# Patient Record
Sex: FEMALE | Race: WHITE | NOT HISPANIC OR LATINO | URBAN - METROPOLITAN AREA
[De-identification: names, ages, dates, MRNs, and addresses within clinical notes are randomized per-mention and may not be internally consistent; named-entity substitution may affect disease eponyms.]

---

## 2018-09-10 ENCOUNTER — INPATIENT (INPATIENT)
Facility: HOSPITAL | Age: 73
LOS: 1 days | Discharge: ANOTHER HOSPITAL | End: 2018-09-12
Attending: INTERNAL MEDICINE | Admitting: INTERNAL MEDICINE
Payer: MEDICARE

## 2018-09-10 VITALS
SYSTOLIC BLOOD PRESSURE: 150 MMHG | TEMPERATURE: 98 F | HEART RATE: 103 BPM | OXYGEN SATURATION: 100 % | RESPIRATION RATE: 16 BRPM | DIASTOLIC BLOOD PRESSURE: 50 MMHG

## 2018-09-10 DIAGNOSIS — Z29.9 ENCOUNTER FOR PROPHYLACTIC MEASURES, UNSPECIFIED: ICD-10-CM

## 2018-09-10 DIAGNOSIS — Z90.49 ACQUIRED ABSENCE OF OTHER SPECIFIED PARTS OF DIGESTIVE TRACT: Chronic | ICD-10-CM

## 2018-09-10 DIAGNOSIS — I10 ESSENTIAL (PRIMARY) HYPERTENSION: ICD-10-CM

## 2018-09-10 DIAGNOSIS — Z98.890 OTHER SPECIFIED POSTPROCEDURAL STATES: Chronic | ICD-10-CM

## 2018-09-10 DIAGNOSIS — C79.9 SECONDARY MALIGNANT NEOPLASM OF UNSPECIFIED SITE: ICD-10-CM

## 2018-09-10 DIAGNOSIS — M54.5 LOW BACK PAIN: ICD-10-CM

## 2018-09-10 DIAGNOSIS — M54.42 LUMBAGO WITH SCIATICA, LEFT SIDE: ICD-10-CM

## 2018-09-10 LAB
ALBUMIN SERPL ELPH-MCNC: 3.9 G/DL — SIGNIFICANT CHANGE UP (ref 3.3–5)
ALP SERPL-CCNC: 75 U/L — SIGNIFICANT CHANGE UP (ref 40–120)
ALT FLD-CCNC: 7 U/L — SIGNIFICANT CHANGE UP (ref 4–33)
AST SERPL-CCNC: 13 U/L — SIGNIFICANT CHANGE UP (ref 4–32)
BASE EXCESS BLDV CALC-SCNC: 5 MMOL/L — SIGNIFICANT CHANGE UP
BASOPHILS # BLD AUTO: 0.08 K/UL — SIGNIFICANT CHANGE UP (ref 0–0.2)
BASOPHILS NFR BLD AUTO: 0.6 % — SIGNIFICANT CHANGE UP (ref 0–2)
BILIRUB SERPL-MCNC: 0.5 MG/DL — SIGNIFICANT CHANGE UP (ref 0.2–1.2)
BLOOD GAS VENOUS - CREATININE: 0.99 MG/DL — SIGNIFICANT CHANGE UP (ref 0.5–1.3)
BUN SERPL-MCNC: 15 MG/DL — SIGNIFICANT CHANGE UP (ref 7–23)
CALCIUM SERPL-MCNC: 10.9 MG/DL — HIGH (ref 8.4–10.5)
CHLORIDE BLDV-SCNC: 104 MMOL/L — SIGNIFICANT CHANGE UP (ref 96–108)
CHLORIDE SERPL-SCNC: 98 MMOL/L — SIGNIFICANT CHANGE UP (ref 98–107)
CO2 SERPL-SCNC: 24 MMOL/L — SIGNIFICANT CHANGE UP (ref 22–31)
CREAT SERPL-MCNC: 0.97 MG/DL — SIGNIFICANT CHANGE UP (ref 0.5–1.3)
EOSINOPHIL # BLD AUTO: 0.17 K/UL — SIGNIFICANT CHANGE UP (ref 0–0.5)
EOSINOPHIL NFR BLD AUTO: 1.2 % — SIGNIFICANT CHANGE UP (ref 0–6)
GAS PNL BLDV: 136 MMOL/L — SIGNIFICANT CHANGE UP (ref 136–146)
GLUCOSE BLDV-MCNC: 121 — HIGH (ref 70–99)
GLUCOSE SERPL-MCNC: 119 MG/DL — HIGH (ref 70–99)
HCO3 BLDV-SCNC: 27 MMOL/L — SIGNIFICANT CHANGE UP (ref 20–27)
HCT VFR BLD CALC: 39.7 % — SIGNIFICANT CHANGE UP (ref 34.5–45)
HCT VFR BLDV CALC: 40.6 % — SIGNIFICANT CHANGE UP (ref 34.5–45)
HGB BLD-MCNC: 12.6 G/DL — SIGNIFICANT CHANGE UP (ref 11.5–15.5)
HGB BLDV-MCNC: 13.2 G/DL — SIGNIFICANT CHANGE UP (ref 11.5–15.5)
IMM GRANULOCYTES # BLD AUTO: 0.06 # — SIGNIFICANT CHANGE UP
IMM GRANULOCYTES NFR BLD AUTO: 0.4 % — SIGNIFICANT CHANGE UP (ref 0–1.5)
LACTATE BLDV-MCNC: 2.2 MMOL/L — HIGH (ref 0.5–2)
LIDOCAIN IGE QN: 13.1 U/L — SIGNIFICANT CHANGE UP (ref 7–60)
LYMPHOCYTES # BLD AUTO: 1.41 K/UL — SIGNIFICANT CHANGE UP (ref 1–3.3)
LYMPHOCYTES # BLD AUTO: 10.2 % — LOW (ref 13–44)
MCHC RBC-ENTMCNC: 27.5 PG — SIGNIFICANT CHANGE UP (ref 27–34)
MCHC RBC-ENTMCNC: 31.7 % — LOW (ref 32–36)
MCV RBC AUTO: 86.5 FL — SIGNIFICANT CHANGE UP (ref 80–100)
MONOCYTES # BLD AUTO: 0.83 K/UL — SIGNIFICANT CHANGE UP (ref 0–0.9)
MONOCYTES NFR BLD AUTO: 6 % — SIGNIFICANT CHANGE UP (ref 2–14)
NEUTROPHILS # BLD AUTO: 11.24 K/UL — HIGH (ref 1.8–7.4)
NEUTROPHILS NFR BLD AUTO: 81.6 % — HIGH (ref 43–77)
NRBC # FLD: 0 — SIGNIFICANT CHANGE UP
PCO2 BLDV: 42 MMHG — SIGNIFICANT CHANGE UP (ref 41–51)
PH BLDV: 7.45 PH — HIGH (ref 7.32–7.43)
PLATELET # BLD AUTO: 358 K/UL — SIGNIFICANT CHANGE UP (ref 150–400)
PMV BLD: 9.7 FL — SIGNIFICANT CHANGE UP (ref 7–13)
PO2 BLDV: < 24 MMHG — LOW (ref 35–40)
POTASSIUM BLDV-SCNC: 4.3 MMOL/L — SIGNIFICANT CHANGE UP (ref 3.4–4.5)
POTASSIUM SERPL-MCNC: 4 MMOL/L — SIGNIFICANT CHANGE UP (ref 3.5–5.3)
POTASSIUM SERPL-SCNC: 4 MMOL/L — SIGNIFICANT CHANGE UP (ref 3.5–5.3)
PROT SERPL-MCNC: 8.1 G/DL — SIGNIFICANT CHANGE UP (ref 6–8.3)
RBC # BLD: 4.59 M/UL — SIGNIFICANT CHANGE UP (ref 3.8–5.2)
RBC # FLD: 14.8 % — HIGH (ref 10.3–14.5)
SAO2 % BLDV: 32.6 % — LOW (ref 60–85)
SODIUM SERPL-SCNC: 137 MMOL/L — SIGNIFICANT CHANGE UP (ref 135–145)
WBC # BLD: 13.79 K/UL — HIGH (ref 3.8–10.5)
WBC # FLD AUTO: 13.79 K/UL — HIGH (ref 3.8–10.5)

## 2018-09-10 PROCEDURE — 99223 1ST HOSP IP/OBS HIGH 75: CPT

## 2018-09-10 PROCEDURE — 71045 X-RAY EXAM CHEST 1 VIEW: CPT | Mod: 26,77

## 2018-09-10 PROCEDURE — 71045 X-RAY EXAM CHEST 1 VIEW: CPT | Mod: 26

## 2018-09-10 RX ORDER — HYDROMORPHONE HYDROCHLORIDE 2 MG/ML
1 INJECTION INTRAMUSCULAR; INTRAVENOUS; SUBCUTANEOUS EVERY 4 HOURS
Qty: 0 | Refills: 0 | Status: DISCONTINUED | OUTPATIENT
Start: 2018-09-10 | End: 2018-09-11

## 2018-09-10 RX ORDER — LOSARTAN POTASSIUM 100 MG/1
50 TABLET, FILM COATED ORAL DAILY
Qty: 0 | Refills: 0 | Status: DISCONTINUED | OUTPATIENT
Start: 2018-09-10 | End: 2018-09-12

## 2018-09-10 RX ORDER — ENOXAPARIN SODIUM 100 MG/ML
40 INJECTION SUBCUTANEOUS EVERY 24 HOURS
Qty: 0 | Refills: 0 | Status: DISCONTINUED | OUTPATIENT
Start: 2018-09-10 | End: 2018-09-11

## 2018-09-10 RX ORDER — HYDROMORPHONE HYDROCHLORIDE 2 MG/ML
1 INJECTION INTRAMUSCULAR; INTRAVENOUS; SUBCUTANEOUS ONCE
Qty: 0 | Refills: 0 | Status: DISCONTINUED | OUTPATIENT
Start: 2018-09-10 | End: 2018-09-10

## 2018-09-10 RX ORDER — OXYCODONE AND ACETAMINOPHEN 5; 325 MG/1; MG/1
1 TABLET ORAL EVERY 6 HOURS
Qty: 0 | Refills: 0 | Status: DISCONTINUED | OUTPATIENT
Start: 2018-09-10 | End: 2018-09-11

## 2018-09-10 RX ORDER — HYDROMORPHONE HYDROCHLORIDE 2 MG/ML
0.5 INJECTION INTRAMUSCULAR; INTRAVENOUS; SUBCUTANEOUS ONCE
Qty: 0 | Refills: 0 | Status: DISCONTINUED | OUTPATIENT
Start: 2018-09-10 | End: 2018-09-10

## 2018-09-10 RX ORDER — SODIUM CHLORIDE 9 MG/ML
1000 INJECTION INTRAMUSCULAR; INTRAVENOUS; SUBCUTANEOUS ONCE
Qty: 0 | Refills: 0 | Status: COMPLETED | OUTPATIENT
Start: 2018-09-10 | End: 2018-09-10

## 2018-09-10 RX ORDER — INFLUENZA VIRUS VACCINE 15; 15; 15; 15 UG/.5ML; UG/.5ML; UG/.5ML; UG/.5ML
0.5 SUSPENSION INTRAMUSCULAR ONCE
Qty: 0 | Refills: 0 | Status: DISCONTINUED | OUTPATIENT
Start: 2018-09-10 | End: 2018-09-12

## 2018-09-10 RX ADMIN — HYDROMORPHONE HYDROCHLORIDE 1 MILLIGRAM(S): 2 INJECTION INTRAMUSCULAR; INTRAVENOUS; SUBCUTANEOUS at 20:31

## 2018-09-10 RX ADMIN — SODIUM CHLORIDE 1000 MILLILITER(S): 9 INJECTION INTRAMUSCULAR; INTRAVENOUS; SUBCUTANEOUS at 17:26

## 2018-09-10 RX ADMIN — HYDROMORPHONE HYDROCHLORIDE 0.5 MILLIGRAM(S): 2 INJECTION INTRAMUSCULAR; INTRAVENOUS; SUBCUTANEOUS at 23:14

## 2018-09-10 RX ADMIN — HYDROMORPHONE HYDROCHLORIDE 1 MILLIGRAM(S): 2 INJECTION INTRAMUSCULAR; INTRAVENOUS; SUBCUTANEOUS at 17:35

## 2018-09-10 RX ADMIN — HYDROMORPHONE HYDROCHLORIDE 1 MILLIGRAM(S): 2 INJECTION INTRAMUSCULAR; INTRAVENOUS; SUBCUTANEOUS at 15:45

## 2018-09-10 RX ADMIN — HYDROMORPHONE HYDROCHLORIDE 1 MILLIGRAM(S): 2 INJECTION INTRAMUSCULAR; INTRAVENOUS; SUBCUTANEOUS at 20:01

## 2018-09-10 RX ADMIN — HYDROMORPHONE HYDROCHLORIDE 1 MILLIGRAM(S): 2 INJECTION INTRAMUSCULAR; INTRAVENOUS; SUBCUTANEOUS at 15:26

## 2018-09-10 RX ADMIN — HYDROMORPHONE HYDROCHLORIDE 1 MILLIGRAM(S): 2 INJECTION INTRAMUSCULAR; INTRAVENOUS; SUBCUTANEOUS at 17:22

## 2018-09-10 RX ADMIN — HYDROMORPHONE HYDROCHLORIDE 0.5 MILLIGRAM(S): 2 INJECTION INTRAMUSCULAR; INTRAVENOUS; SUBCUTANEOUS at 23:44

## 2018-09-10 RX ADMIN — ENOXAPARIN SODIUM 40 MILLIGRAM(S): 100 INJECTION SUBCUTANEOUS at 21:44

## 2018-09-10 NOTE — ED PROVIDER NOTE - ATTENDING CONTRIBUTION TO CARE
73F h/o HTN, spinal stenosis presents with back pain. Had imaging one week ago including CT and MRI that showed likely new uterine vs endometrial cancer with spine mets in L2, no evidence spinal cord compression, retroperitoneal mets. Lives at home alone, unable to perform ADLs due to pain. No reports of bowel or bladder incontinence. L > R leg swelling but no weakness. No CP/SOB. On exam NAD, mmm, lungs CTAB, RRR, abdomen soft NT/ND, 2+ edema R, 3+  edema L, 2+ pulses b/l, diffuse lumbar spine ttp, neuro A&Ox3, no focal deficits, skin warm and dry, no rash. Plan for u/s to evaluate for DVT. Needs admission for pain control and further oncologic evaluation.

## 2018-09-10 NOTE — H&P ADULT - FAMILY HISTORY
Mother  Still living? Unknown  Family history of diabetes mellitus, Age at diagnosis: Age Unknown  Family history of acute myocardial infarction, Age at diagnosis: Age Unknown     Sibling  Still living? Unknown  Family history of diabetes mellitus, Age at diagnosis: Age Unknown

## 2018-09-10 NOTE — H&P ADULT - PROBLEM SELECTOR PLAN 1
- Dilaudid/percocet prn for pain ctrl  - neurosurgery consult reviewed  - TLSO brace  - neuro checks q4

## 2018-09-10 NOTE — ED PROVIDER NOTE - MEDICAL DECISION MAKING DETAILS
multiple medical complaints including low back pain, doubt cord compression but has e/o root compression per her paperwork from prior MRI/CT. Patient needs admission for pain control and PT/OT at least but will benefit from gyn/onc consult and spine consult (to be done when admitted). In ER will do basic labs, analgesia, trop, ekg, cxr, admit.

## 2018-09-10 NOTE — H&P ADULT - NSHPREVIEWOFSYSTEMS_GEN_ALL_CORE
REVIEW OF SYSTEMS    General:  Poor appetite  Skin/Breast:  Negative  Ophthalmologic:  Negative  ENMT:  Negative  Respiratory and Thorax:  Negative  Cardiovascular:  + Pedal edema 2/2 extended periods of sitting.  Occasional exertional chest pain    Gastrointestinal:  Negative  Genitourinary:  Negative  Musculoskeletal:  Back pain  Neurological:  L thigh paresthesia   Psychiatric:  Negative  Hematology/Lymphatics:  Negative	  Endocrine:	  Negative  Allergic/Immunologic:	 Negative

## 2018-09-10 NOTE — ED PROVIDER NOTE - CARE PLAN
Principal Discharge DX:	Acute bilateral low back pain without sciatica  Secondary Diagnosis:	Metastatic cancer

## 2018-09-10 NOTE — CONSULT NOTE ADULT - PROBLEM SELECTOR RECOMMENDATION 9
-[facility] records of MRI/CT being uploaded. Will f/u results  -Will evaluate pt further after pt admitted. Unable to complete physical exam at this time 2/2 back pain.    To be d/w attending  DINORA Villalta pgy2 -[facility] records of MRI/CT being uploaded. Will f/u results  -Will evaluate pt further after pt admitted.    To be d/w attending  DINORA Villalta pgy2

## 2018-09-10 NOTE — CONSULT NOTE ADULT - SUBJECTIVE AND OBJECTIVE BOX
R2 GYN ED CONSULT NOTE    72yo G0 postmenopausal woman presenting to the ED with lower back pain that has worsen in the past couple of days. She was evaluated by an outside ortho gor    Pt denies fever, chills, nausea, vomiting, diarrhea, headache, constipation, dizzyness, syncope, chest pain, palpitations, shortness of breath, dysuria, urgency, frequency, abdominal/pelvic pain, vaginal bleeding/discharge/odor/pain/itching, breast lumps/bumps, dyspareunia.    In ED today    Primary OB/GYN:  OBH:  GYNH: denies hx of fibroids, ov cysts, abnl paps, sti  PMSH:  MEDS: none  ALL: nkda  SOCH: denies t/e/d; safe at home  FAMH: denies fam hx of breast/uterine/ovarian/colon cancer  ROS: negative except per hpi    OBJECTIVE:    VITAL SIGNS:  Vital Signs Last 24 Hrs  T(C): 36.7 (10 Sep 2018 13:53), Max: 36.7 (10 Sep 2018 13:53)  T(F): 98.1 (10 Sep 2018 13:53), Max: 98.1 (10 Sep 2018 13:53)  HR: 98 (10 Sep 2018 15:26) (98 - 103)  BP: 189/76 (10 Sep 2018 15:26) (150/50 - 189/76)  BP(mean): --  RR: 18 (10 Sep 2018 15:26) (16 - 18)  SpO2: 99% (10 Sep 2018 15:26) (99% - 100%)    CAPILLARY BLOOD GLUCOSE            PHYSICAL EXAM:  GEN: NAD, A&Ox3  CV: RRR, no m/g/r  LUNGS: CTAB  ABD: soft, NT, ND, +BS  SPECULUM:  PELVIC:  EXT: WWP, no edema/TTP    LABS:                        12.6   13.79 )-----------( 358      ( 10 Sep 2018 15:25 )             39.7   baso 0.6    eos 1.2    imm gran 0.4    lymph 10.2   mono 6.0    poly 81.6       09-10    137  |  98  |  15  ----------------------------<  119<H>  4.0   |  24  |  0.97    Ca    10.9<H>      10 Sep 2018 15:25    TPro  8.1  /  Alb  3.9  /  TBili  0.5  /  DBili  x   /  AST  13  /  ALT  7   /  AlkPhos  75  09-10          RADIOLOGY:    ASSESSMENT:    RECOMMENDATIONS: R2 GYN ED CONSULT NOTE    74yo G0 postmenopausal woman presenting to the ED with lower back pain that has worsen in the past couple of days. Pain is bilateral and radiating to the lower extremities. She was evaluated by an outside ortho 1 weeks ago and had an outside MRI and CT. She states that she had the imaging to evaluate for spinal stenosis but imaging showed concern for uterine malignancy. She was scheduled to see a gyn/onc at [facility] on Wednesday but came in the ED because the pain was too severe.     She has not seen a gyn since 2001 when she had a D+C for AUB. She states that to her knowledge the pathology was benign.     She has never been sexually active. Denied hx of abn pap (last pap on 2001), fibroids, stds, ovarian cysts.     She has been having episodic vaginal spotting for the past couple of years. Spotting is minimal. She has vague lower abdominal discomfort. She states that she has lost about 35 pounds in the last 6 months or so. She has not had a BM in a week.     Pt denies fever, chills, nausea, vomiting, diarrhea, headache,  dizzyness, syncope, chest pain, palpitations, shortness of breath, dysuria, urgency, frequency, , breast lumps/bumps, dyspareunia.     Last mammogram she states was last year. WNL. No hx of abn mammogram  Never had a colonoscopy.         Primary OB/GYN:none  OBH:G0  GYNH: denies hx of fibroids, ov cysts, abnl paps, sti  PMSH: D+C (2001), s/p cholecystectomy   MEDS: none  ALL: nkda  SOCH: denies t/e/d; safe at home  FAMH: denies fam hx of breast/uterine/ovarian/colon cancer  ROS: negative except per hpi    OBJECTIVE:    VITAL SIGNS:  Vital Signs Last 24 Hrs  T(C): 36.7 (10 Sep 2018 13:53), Max: 36.7 (10 Sep 2018 13:53)  T(F): 98.1 (10 Sep 2018 13:53), Max: 98.1 (10 Sep 2018 13:53)  HR: 98 (10 Sep 2018 15:26) (98 - 103)  BP: 189/76 (10 Sep 2018 15:26) (150/50 - 189/76)  BP(mean): --  RR: 18 (10 Sep 2018 15:26) (16 - 18)  SpO2: 99% (10 Sep 2018 15:26) (99% - 100%)    CAPILLARY BLOOD GLUCOSE            PHYSICAL EXAM:  GEN: NAD, A&Ox3  CV: RRR, no m/g/r  LUNGS: CTAB  ABD: soft, tender to deep palpation in the lower quadrants Exam limited 2/2 back pain  SPECULUM: eferred   PELVIC: Deferred   EXT: WWP, no edema/TTP    LABS:                        12.6   13.79 )-----------( 358      ( 10 Sep 2018 15:25 )             39.7   baso 0.6    eos 1.2    imm gran 0.4    lymph 10.2   mono 6.0    poly 81.6       09-10    137  |  98  |  15  ----------------------------<  119<H>  4.0   |  24  |  0.97    Ca    10.9<H>      10 Sep 2018 15:25    TPro  8.1  /  Alb  3.9  /  TBili  0.5  /  DBili  x   /  AST  13  /  ALT  7   /  AlkPhos  75  09-10

## 2018-09-10 NOTE — CONSULT NOTE ADULT - SUBJECTIVE AND OBJECTIVE BOX
Pager: [phone]     HPI: 73F with history of low back pain for 1 year treated conservatively presents with severe low back pain. Patient noticed low back pain developing since August 2017 and has seen multiple providers which have treated her back pain conservatively with gabapentin and pain control. In the past month she obtained MRI Lspine which revealed erosive mass at L2 and was recommended to get CT Abd/pelvis which revealed uterine mass along with two lung masses. She was scheduled to see oncologist as outpatient but due to worsening low back pain she came to ER. On evaluation patient complains of low back pain radiating to outside of left knee. She denies saddle anesthesia, incontinence but states that she has had difficulty getting out of bed or out of a chair due to pain for past month. She ambulating with cane for past year.    PAST MEDICAL HISTORY   No pertinent past medical history    PAST SURGICAL HISTORY   No significant past surgical history    ALLERGY  No Known Allergies    MEDICATIONS:  Antibiotics:    Neuro:    Anticoagulation:    Other:    REVIEW OF SYSTEMS:  Check here if all are normal other than Neurological []  General:  Eyes:  ENT:  Cardiac:  Respiratory:  GI:  Musculoskeletal:   Skin: Lower extremity swelling  Neurologic:   Psychiatric:     EXAMINATION:    GCS: 15 (E4 V5 M6)  T(C): 36.7 (09-10-18 @ 13:53), Max: 36.7 (09-10-18 @ 13:53)  HR: 98 (09-10-18 @ 15:26) (98 - 103)  BP: 189/76 (09-10-18 @ 15:26) (150/50 - 189/76)  RR: 18 (09-10-18 @ 15:26) (16 - 18)  SpO2: 99% (09-10-18 @ 15:26) (99% - 100%)    Constitutional: No Acute Distress     Neurological: AOx3, Following Commands    Motor exam:          Upper extremity                         Delt     Bicep     Tricep    HG                                                 R         5/5        5/5        5/5       5/5                                               L          5/5        5/5        5/5       5/5          Lower extremity                        HF         KF        KE       DF         PF                                                  R        5/5        5/5        5/5       5/5         5/5                                               L         4+/5        5/5       5/5       5/5          5/5                                                 Sensation: [] intact to light touch  [x] decreased: Left lateral thigh    No clonus    LABS:                        12.6   13.79 )-----------( 358      ( 10 Sep 2018 15:25 )             39.7     09-10    137  |  98  |  15  ----------------------------<  119<H>  4.0   |  24  |  0.97    Ca    10.9<H>      10 Sep 2018 15:25    TPro  8.1  /  Alb  3.9  /  TBili  0.5  /  DBili  x   /  AST  13  /  ALT  7   /  AlkPhos  75  09-10

## 2018-09-10 NOTE — ED ADULT TRIAGE NOTE - CHIEF COMPLAINT QUOTE
pt c/o lower back pain, lower abd pian and constipation x 5 days. pt c/o left lower leg pain, swelling and redness x 1 week.  pmhx htn, spinal stenosis

## 2018-09-10 NOTE — ED PROVIDER NOTE - OBJECTIVE STATEMENT
73 female history of HTN and spinal stenosis here for low back pain and constipation. Low back pain started Feb 2017, recent MRI and CT with IV contrast shows spinal stenosis without cord compression but with uterine mass suspicious for uterine CA with lesions suspicious for mets in spine and in lungs. Awaiting appointment with oncologist this week but could not tolerate back pain which is severe, worse when lays flat, nonradiating. Unable to sleep or care for ADLs on her own. Lives alone now. No perianal anaesthesia. left leg weaker then right. Legs swelling and getting red because has not layed flat in days. Also endorsing weakness, intermittent chest pains.

## 2018-09-10 NOTE — ED PROVIDER NOTE - PHYSICAL EXAMINATION
Gen: NAD, AOx3, non-toxic, obese//            Head: NCAT //            HEENT: EOMI, oral mucosa moist, normal conjunctiva //            Lung: CTAB, no respiratory distress, no wheezes/rhonchi/rales B/L, speaking in full sentences. //            CV: tachy rate, normal rhythm, no murmurs, rubs or gallops //            Abd: soft, NTND, no guarding, no CVA tenderness //            MSK: tenderness to palpation over around L2/L3,   //            Neuro: 5/5 strength in bilateral lower extremities, sensory intact in bilateral lower extremities  , no saddle anaesthesia.  //            Skin: bilateral lower extremities mildly erythematous, normal temp, LLE more swollen compared to the right. otherwise MSK exam WINL//            Psych: normal affect. ~Joel Angeles M.D., Ph.D. -Resident

## 2018-09-10 NOTE — H&P ADULT - PROBLEM SELECTOR PLAN 2
Signs of metastatic  cancer on outpatient imaging  - gyn consult reviewed  - CT a/p image uploaded in [facility], will check CT chest for further staging  - consult oncology in am

## 2018-09-10 NOTE — ED ADULT NURSE NOTE - OBJECTIVE STATEMENT
73F PMH HTN and spinal stenosis here for low back pain and constipation and abnormal CT. Low back pain started Feb 2017, recent MRI and CT with IV contrast shows spinal stenosis without cord compression but with uterine mass suspicious for uterine CA with lesions suspicious for mets in spine and in lungs. Awaiting appointment with oncologist this week but could not tolerate back pain which is severe, worse when lays flat, nonradiating. Unable to sleep or care for ADLs on her own the past week. No saddle anesthesia. Left leg weaker then right. Pt also reports intermittent leg swelling and redness.   Legs swelling and getting red because has not layed flat in days. Also endorsing weakness, intermittent chest pains.

## 2018-09-10 NOTE — ED ADULT NURSE NOTE - NSIMPLEMENTINTERV_GEN_ALL_ED
Implemented All Fall Risk Interventions:  [place] to call system. Call bell, personal items and telephone within reach. Instruct patient to call for assistance. Room bathroom lighting operational. Non-slip footwear when patient is off stretcher. Physically safe environment: no spills, clutter or unnecessary equipment. Stretcher in lowest position, wheels locked, appropriate side rails in place. Provide visual cue, wrist band, yellow gown, etc. Monitor gait and stability. Monitor for mental status changes and reorient to person, place, and time. Review medications for side effects contributing to fall risk. Reinforce activity limits and safety measures with patient and family.

## 2018-09-10 NOTE — H&P ADULT - NSHPPHYSICALEXAM_GEN_ALL_CORE
Vital Signs Last 24 Hrs  T(C): 36.5 (10 Sep 2018 19:48), Max: 36.8 (10 Sep 2018 19:14)  T(F): 97.7 (10 Sep 2018 19:48), Max: 98.2 (10 Sep 2018 19:14)  HR: 93 (10 Sep 2018 19:48) (93 - 103)  BP: 145/71 (10 Sep 2018 19:48) (127/68 - 189/76)  BP(mean): --  RR: 16 (10 Sep 2018 19:48) (16 - 18)  SpO2: 100% (10 Sep 2018 19:48) (98% - 100%)    PHYSICAL EXAM:  GENERAL: No Acute Distress  EYES: conjunctiva and sclera clear  ENMT: Moist mucous membranes   NECK: Supple  CHEST/LUNG: Clear to auscultation bilaterally  HEART: Regular rate and rhythm; No murmurs, rubs, or gallops  ABDOMEN: Soft, Nontender, Nondistended; Bowel sounds normal  EXTREMITIES:   No clubbing, or cyanosis + pedal edema  PSYCH: Normal Affect, Normal Behavior  NEUROLOGY:   - Mental status A&O x 3,  - Cranial Nerves II-XII intact  - Motor: RLE 5/5, LLE HF/KE limited by pain, DF/PF 5/5  SKIN: No rashes or lesions  MUSCULOSKELETAL: No joint swelling

## 2018-09-10 NOTE — ED PROVIDER NOTE - PROGRESS NOTE DETAILS
- MD Karthik,PhD - Resident: patient seen by gyn and spine, found to be hypercalcemic so fluids ordered. Pain not well controlled so more dilaudid ordered. CT abdominal pain uploaded. Will admit

## 2018-09-10 NOTE — H&P ADULT - NSHPLABSRESULTS_GEN_ALL_CORE
09-10    137  |  98  |  15  ----------------------------<  119<H>  4.0   |  24  |  0.97    Ca    10.9<H>      10 Sep 2018 15:25    TPro  8.1  /  Alb  3.9  /  TBili  0.5  /  DBili  x   /  AST  13  /  ALT  7   /  AlkPhos  75  09-10                            12.6   13.79 )-----------( 358      ( 10 Sep 2018 15:25 )             39.7    Outpt MRI report reviewed as in HPI

## 2018-09-10 NOTE — H&P ADULT - PSH
S/P cholecystectomy    S/P dilation and curettage  Early 2000's for DUB, findings benign.  S/P right knee arthroscopy

## 2018-09-10 NOTE — H&P ADULT - HISTORY OF PRESENT ILLNESS
74 y/o F with HTN, prediabetes and chronic back pain 2/2 spinal stenosis p/w worsening of chronic back pain.  Pt reports pain in lower back with R sided sciatica since early 2017.  She had imaging in 2017 which revealed lumbar spinal stenosis.  She had been prescribed gabapentin as well as percocet which she only needed sparingly.  Pt recently has developed worsening lower back pain with paresthesia radiating to L thigh.  Pain makes it difficult to walk, and she feels that her L leg is weaker.  Pt has been unable to lay flat, or stand 2/2 the pain and has spent most of her time sitting.  She has been taking percocet without relief.  She has chronic incontinence, but no new symptoms.  She has been constipated which she attributes to taking percocet more frequently.  She has not had foot numbness or saddle anesthesia.  She had imaging done 2 weeks ago including MRI which showed an infiltrative process at L2 vertebral body, lumbar spinal stenosis and RP soft tissue mass.  CT a/p showed signs of uterine ca with mets to bone and lung.  Pt was referred to an oncologist with appointment scheduled later this week, but due to increasing severity of pain, she was advised to come to the ED by her PCP.       In the ED, pt was given Dilaudid 1mg IV x 2 which provided relief of pain. 74 y/o F with HTN, prediabetes and chronic back pain 2/2 spinal stenosis p/w worsening of chronic back pain.  Pt reports pain in lower back with R sided sciatica since early 2017.  She had imaging in 2017 which revealed lumbar spinal stenosis.  She had been prescribed gabapentin as well as percocet which she only needed sparingly.  Pt recently has developed worsening lower back pain with paresthesia radiating to L thigh.  Pain makes it difficult to walk, and she feels that her L leg is weaker.  Pt has been unable to lay flat, or stand 2/2 the pain and has spent most of her time sitting.  She has been taking percocet without relief.  She has chronic incontinence, but no new symptoms.  She has been constipated which she attributes to taking percocet more frequently.  She has not had foot numbness or saddle anesthesia.  She had imaging done 2 weeks ago including MRI which showed an infiltrative process at L2 vertebral body, lumbar spinal stenosis and RP soft tissue mass.  CT a/p showed signs suspicious of uterine ca with mets to bone and lung.  Pt was referred to an oncologist with appointment scheduled later this week, but due to increasing severity of pain, she was advised to come to the ED by her PCP.       Of note, pt had D+C for abnormal uterine bleeding in early 2000's which showed benign disease.     In the ED, pt was given Dilaudid 1mg IV x 2 which provided relief of pain.

## 2018-09-10 NOTE — ED PROVIDER NOTE - NS ED ROS FT
GENERAL: No fever or chills, //             EYES: no change in vision, //             HEENT: no trouble swallowing or speaking, //                     PULMONARY: no cough or SOB, //             GI: no abdominal pain, no nausea or no vomiting, no diarrhea or constipation, //             : No changes in urination,  //            SKIN: no rashes,  //            NEURO: no headache,  //           otherwise as HPI or negative. ~Joel Angeles M.D., Ph.D. -Resident

## 2018-09-10 NOTE — H&P ADULT - ASSESSMENT
74 y/o F with HTN, lumbar spinal stenosis and prediabetes admitted for severe back pain with findings of metastatic GYN malignancy.

## 2018-09-11 DIAGNOSIS — R22.2 LOCALIZED SWELLING, MASS AND LUMP, TRUNK: ICD-10-CM

## 2018-09-11 DIAGNOSIS — D72.829 ELEVATED WHITE BLOOD CELL COUNT, UNSPECIFIED: ICD-10-CM

## 2018-09-11 DIAGNOSIS — M79.89 OTHER SPECIFIED SOFT TISSUE DISORDERS: ICD-10-CM

## 2018-09-11 DIAGNOSIS — M54.9 DORSALGIA, UNSPECIFIED: ICD-10-CM

## 2018-09-11 DIAGNOSIS — L03.116 CELLULITIS OF LEFT LOWER LIMB: ICD-10-CM

## 2018-09-11 LAB
APPEARANCE UR: SIGNIFICANT CHANGE UP
BACTERIA # UR AUTO: NEGATIVE — SIGNIFICANT CHANGE UP
BILIRUB UR-MCNC: NEGATIVE — SIGNIFICANT CHANGE UP
BLOOD UR QL VISUAL: HIGH
BUN SERPL-MCNC: 15 MG/DL — SIGNIFICANT CHANGE UP (ref 7–23)
CALCIUM SERPL-MCNC: 10.1 MG/DL — SIGNIFICANT CHANGE UP (ref 8.4–10.5)
CHLORIDE SERPL-SCNC: 100 MMOL/L — SIGNIFICANT CHANGE UP (ref 98–107)
CO2 SERPL-SCNC: 23 MMOL/L — SIGNIFICANT CHANGE UP (ref 22–31)
COLOR SPEC: YELLOW — SIGNIFICANT CHANGE UP
CREAT SERPL-MCNC: 0.89 MG/DL — SIGNIFICANT CHANGE UP (ref 0.5–1.3)
GLUCOSE SERPL-MCNC: 136 MG/DL — HIGH (ref 70–99)
GLUCOSE UR-MCNC: NEGATIVE — SIGNIFICANT CHANGE UP
HCT VFR BLD CALC: 37.6 % — SIGNIFICANT CHANGE UP (ref 34.5–45)
HGB BLD-MCNC: 11.9 G/DL — SIGNIFICANT CHANGE UP (ref 11.5–15.5)
HYALINE CASTS # UR AUTO: NEGATIVE — SIGNIFICANT CHANGE UP
KETONES UR-MCNC: SIGNIFICANT CHANGE UP
LEUKOCYTE ESTERASE UR-ACNC: SIGNIFICANT CHANGE UP
MAGNESIUM SERPL-MCNC: 2.1 MG/DL — SIGNIFICANT CHANGE UP (ref 1.6–2.6)
MCHC RBC-ENTMCNC: 27.9 PG — SIGNIFICANT CHANGE UP (ref 27–34)
MCHC RBC-ENTMCNC: 31.6 % — LOW (ref 32–36)
MCV RBC AUTO: 88.1 FL — SIGNIFICANT CHANGE UP (ref 80–100)
NITRITE UR-MCNC: NEGATIVE — SIGNIFICANT CHANGE UP
NRBC # FLD: 0 — SIGNIFICANT CHANGE UP
PH UR: 6.5 — SIGNIFICANT CHANGE UP (ref 5–8)
PLATELET # BLD AUTO: 324 K/UL — SIGNIFICANT CHANGE UP (ref 150–400)
PMV BLD: 9.7 FL — SIGNIFICANT CHANGE UP (ref 7–13)
POTASSIUM SERPL-MCNC: 3.7 MMOL/L — SIGNIFICANT CHANGE UP (ref 3.5–5.3)
POTASSIUM SERPL-SCNC: 3.7 MMOL/L — SIGNIFICANT CHANGE UP (ref 3.5–5.3)
PROT UR-MCNC: 100 — HIGH
RBC # BLD: 4.27 M/UL — SIGNIFICANT CHANGE UP (ref 3.8–5.2)
RBC # FLD: 15 % — HIGH (ref 10.3–14.5)
RBC CASTS # UR COMP ASSIST: >50 — HIGH (ref 0–?)
SODIUM SERPL-SCNC: 139 MMOL/L — SIGNIFICANT CHANGE UP (ref 135–145)
SP GR SPEC: 1.02 — SIGNIFICANT CHANGE UP (ref 1–1.04)
SQUAMOUS # UR AUTO: SIGNIFICANT CHANGE UP
UROBILINOGEN FLD QL: NORMAL — SIGNIFICANT CHANGE UP
WBC # BLD: 9.47 K/UL — SIGNIFICANT CHANGE UP (ref 3.8–10.5)
WBC # FLD AUTO: 9.47 K/UL — SIGNIFICANT CHANGE UP (ref 3.8–10.5)
WBC UR QL: >50 — HIGH (ref 0–?)

## 2018-09-11 PROCEDURE — 99222 1ST HOSP IP/OBS MODERATE 55: CPT

## 2018-09-11 PROCEDURE — 99222 1ST HOSP IP/OBS MODERATE 55: CPT | Mod: GC

## 2018-09-11 PROCEDURE — 99223 1ST HOSP IP/OBS HIGH 75: CPT | Mod: GC

## 2018-09-11 RX ORDER — DOCUSATE SODIUM 100 MG
100 CAPSULE ORAL THREE TIMES A DAY
Qty: 0 | Refills: 0 | Status: DISCONTINUED | OUTPATIENT
Start: 2018-09-11 | End: 2018-09-12

## 2018-09-11 RX ORDER — LACTULOSE 10 G/15ML
20 SOLUTION ORAL EVERY 6 HOURS
Qty: 0 | Refills: 0 | Status: DISCONTINUED | OUTPATIENT
Start: 2018-09-11 | End: 2018-09-12

## 2018-09-11 RX ORDER — HYDROMORPHONE HYDROCHLORIDE 2 MG/ML
1 INJECTION INTRAMUSCULAR; INTRAVENOUS; SUBCUTANEOUS EVERY 4 HOURS
Qty: 0 | Refills: 0 | Status: DISCONTINUED | OUTPATIENT
Start: 2018-09-11 | End: 2018-09-12

## 2018-09-11 RX ORDER — CEFAZOLIN SODIUM 1 G
1000 VIAL (EA) INJECTION EVERY 8 HOURS
Qty: 0 | Refills: 0 | Status: DISCONTINUED | OUTPATIENT
Start: 2018-09-11 | End: 2018-09-12

## 2018-09-11 RX ORDER — POLYETHYLENE GLYCOL 3350 17 G/17G
17 POWDER, FOR SOLUTION ORAL DAILY
Qty: 0 | Refills: 0 | Status: DISCONTINUED | OUTPATIENT
Start: 2018-09-11 | End: 2018-09-12

## 2018-09-11 RX ORDER — SENNA PLUS 8.6 MG/1
2 TABLET ORAL AT BEDTIME
Qty: 0 | Refills: 0 | Status: DISCONTINUED | OUTPATIENT
Start: 2018-09-11 | End: 2018-09-12

## 2018-09-11 RX ADMIN — HYDROMORPHONE HYDROCHLORIDE 1 MILLIGRAM(S): 2 INJECTION INTRAMUSCULAR; INTRAVENOUS; SUBCUTANEOUS at 22:30

## 2018-09-11 RX ADMIN — HYDROMORPHONE HYDROCHLORIDE 1 MILLIGRAM(S): 2 INJECTION INTRAMUSCULAR; INTRAVENOUS; SUBCUTANEOUS at 01:29

## 2018-09-11 RX ADMIN — LOSARTAN POTASSIUM 50 MILLIGRAM(S): 100 TABLET, FILM COATED ORAL at 05:30

## 2018-09-11 RX ADMIN — LACTULOSE 20 GRAM(S): 10 SOLUTION ORAL at 18:09

## 2018-09-11 RX ADMIN — HYDROMORPHONE HYDROCHLORIDE 1 MILLIGRAM(S): 2 INJECTION INTRAMUSCULAR; INTRAVENOUS; SUBCUTANEOUS at 13:53

## 2018-09-11 RX ADMIN — HYDROMORPHONE HYDROCHLORIDE 1 MILLIGRAM(S): 2 INJECTION INTRAMUSCULAR; INTRAVENOUS; SUBCUTANEOUS at 18:24

## 2018-09-11 RX ADMIN — POLYETHYLENE GLYCOL 3350 17 GRAM(S): 17 POWDER, FOR SOLUTION ORAL at 12:07

## 2018-09-11 RX ADMIN — HYDROMORPHONE HYDROCHLORIDE 1 MILLIGRAM(S): 2 INJECTION INTRAMUSCULAR; INTRAVENOUS; SUBCUTANEOUS at 01:59

## 2018-09-11 RX ADMIN — Medication 100 MILLIGRAM(S): at 21:30

## 2018-09-11 RX ADMIN — Medication 100 MILLIGRAM(S): at 12:06

## 2018-09-11 RX ADMIN — Medication 100 MILLIGRAM(S): at 13:53

## 2018-09-11 RX ADMIN — HYDROMORPHONE HYDROCHLORIDE 1 MILLIGRAM(S): 2 INJECTION INTRAMUSCULAR; INTRAVENOUS; SUBCUTANEOUS at 22:45

## 2018-09-11 RX ADMIN — LACTULOSE 20 GRAM(S): 10 SOLUTION ORAL at 13:19

## 2018-09-11 RX ADMIN — HYDROMORPHONE HYDROCHLORIDE 1 MILLIGRAM(S): 2 INJECTION INTRAMUSCULAR; INTRAVENOUS; SUBCUTANEOUS at 06:00

## 2018-09-11 RX ADMIN — HYDROMORPHONE HYDROCHLORIDE 1 MILLIGRAM(S): 2 INJECTION INTRAMUSCULAR; INTRAVENOUS; SUBCUTANEOUS at 05:30

## 2018-09-11 RX ADMIN — HYDROMORPHONE HYDROCHLORIDE 1 MILLIGRAM(S): 2 INJECTION INTRAMUSCULAR; INTRAVENOUS; SUBCUTANEOUS at 09:59

## 2018-09-11 RX ADMIN — HYDROMORPHONE HYDROCHLORIDE 1 MILLIGRAM(S): 2 INJECTION INTRAMUSCULAR; INTRAVENOUS; SUBCUTANEOUS at 09:36

## 2018-09-11 RX ADMIN — HYDROMORPHONE HYDROCHLORIDE 1 MILLIGRAM(S): 2 INJECTION INTRAMUSCULAR; INTRAVENOUS; SUBCUTANEOUS at 18:09

## 2018-09-11 RX ADMIN — HYDROMORPHONE HYDROCHLORIDE 1 MILLIGRAM(S): 2 INJECTION INTRAMUSCULAR; INTRAVENOUS; SUBCUTANEOUS at 14:11

## 2018-09-11 RX ADMIN — SENNA PLUS 2 TABLET(S): 8.6 TABLET ORAL at 21:30

## 2018-09-11 NOTE — CONSULT NOTE ADULT - SUBJECTIVE AND OBJECTIVE BOX
DEDRA KIRBY 73y Female  MRN-[id number]    Patient is a 73y old  Female who presents with a chief complaint of Back pain (11 Sep 2018 09:28)      HPI:  72 y/o F with HTN, prediabetes and chronic back pain 2/2 spinal stenosis p/w worsening of chronic back pain.  Pt reports pain in lower back with R sided sciatica since early .  She had imaging in 2017 which revealed lumbar spinal stenosis.  She had been prescribed gabapentin as well as percocet which she only needed sparingly.  Pt recently has developed worsening lower back pain with paresthesia radiating to L thigh.  Pain makes it difficult to walk, and she feels that her L leg is weaker.  Pt has been unable to lay flat, or stand 2/2 the pain and has spent most of her time sitting.  She has been taking percocet without relief.  She has chronic incontinence, but no new symptoms.  She has been constipated which she attributes to taking percocet more frequently.  She has not had foot numbness or saddle anesthesia.  She had imaging done 2 weeks ago including MRI which showed an infiltrative process at L2 vertebral body, lumbar spinal stenosis and RP soft tissue mass.  CT a/p showed signs suspicious of uterine ca with mets to bone and lung.  Pt was referred to an oncologist with appointment scheduled later this week, but due to increasing severity of pain, she was advised to come to the ED by her PCP.       Of note, pt had D+C for abnormal uterine bleeding in early 's which showed benign disease.     In the ED, pt was given Dilaudid 1mg IV x 2 which provided relief of pain. (10 Sep 2018 19:39)    Pt with left leg redness, swelling    PAST MEDICAL & SURGICAL HISTORY:  Lumbar spinal stenosis  Prediabetes  Essential (primary) hypertension  S/P right knee arthroscopy  S/P dilation and curettage: Early &#x27;s for DUB, findings benign.  S/P cholecystectomy      Allergies    No Known Allergies    Intolerances        ANTIMICROBIALS:  ceFAZolin   IVPB 1000 every 8 hours      MEDICATIONS  (STANDING):  ceFAZolin   IVPB 1000 milliGRAM(s) IV Intermittent every 8 hours  docusate sodium 100 milliGRAM(s) Oral three times a day  enoxaparin Injectable 40 milliGRAM(s) SubCutaneous every 24 hours  influenza   Vaccine 0.5 milliLiter(s) IntraMuscular once  lactulose Syrup 20 Gram(s) Oral every 6 hours  losartan 50 milliGRAM(s) Oral daily  polyethylene glycol 3350 17 Gram(s) Oral daily  senna 2 Tablet(s) Oral at bedtime      Social History  Smoking: former  Etoh: no  Drug use: no      FAMILY HISTORY:  Family history of acute myocardial infarction (Mother): mother  Family history of diabetes mellitus (Mother, Sibling)      Vital Signs Last 24 Hrs  T(C): 36.4 (11 Sep 2018 05:28), Max: 36.8 (10 Sep 2018 19:14)  T(F): 97.5 (11 Sep 2018 05:28), Max: 98.2 (10 Sep 2018 19:14)  HR: 90 (11 Sep 2018 05:28) (90 - 103)  BP: 152/77 (11 Sep 2018 05:28) (127/68 - 189/76)  BP(mean): --  RR: 18 (11 Sep 2018 05:28) (16 - 18)  SpO2: 96% (11 Sep 2018 05:28) (96% - 100%)    CBC Full  -  ( 11 Sep 2018 05:30 )  WBC Count : 9.47 K/uL  Hemoglobin : 11.9 g/dL  Hematocrit : 37.6 %  Platelet Count - Automated : 324 K/uL  Mean Cell Volume : 88.1 fL  Mean Cell Hemoglobin : 27.9 pg  Mean Cell Hemoglobin Concentration : 31.6 %  Auto Neutrophil # : x  Auto Lymphocyte # : x  Auto Monocyte # : x  Auto Eosinophil # : x  Auto Basophil # : x  Auto Neutrophil % : x  Auto Lymphocyte % : x  Auto Monocyte % : x  Auto Eosinophil % : x  Auto Basophil % : x        139  |  100  |  15  ----------------------------<  136<H>  3.7   |  23  |  0.89    Ca    10.1      11 Sep 2018 05:30  Mg     2.1     -11    TPro  8.1  /  Alb  3.9  /  TBili  0.5  /  DBili  x   /  AST  13  /  ALT  7   /  AlkPhos  75  09-10    LIVER FUNCTIONS - ( 10 Sep 2018 15:25 )  Alb: 3.9 g/dL / Pro: 8.1 g/dL / ALK PHOS: 75 u/L / ALT: 7 u/L / AST: 13 u/L / GGT: x           Urinalysis Basic - ( 11 Sep 2018 02:05 )    Color: YELLOW / Appearance: Lt TURBID / S.018 / pH: 6.5  Gluc: NEGATIVE / Ketone: TRACE  / Bili: NEGATIVE / Urobili: NORMAL   Blood: MODERATE / Protein: 100 / Nitrite: NEGATIVE   Leuk Esterase: LARGE / RBC: >50 / WBC >50   Sq Epi: OCC / Non Sq Epi: x / Bacteria: NEGATIVE        MICROBIOLOGY:    RADIOLOGY  < from: Xray Chest 1 View- PORTABLE-Urgent (09.10.18 @ 18:05) >  1.  Clear lungs.  2.  The abnormality adjacent to the diaphragm and is not visualized

## 2018-09-11 NOTE — PROGRESS NOTE ADULT - SUBJECTIVE AND OBJECTIVE BOX
SUBJECTIVE / OVERNIGHT EVENTS: pt linaas her back pain is little better , denies chest pain, shortness of breath, nausea, vomiting  weakness numbness in ext       MEDICATIONS  (STANDING):  ceFAZolin   IVPB 1000 milliGRAM(s) IV Intermittent every 8 hours  docusate sodium 100 milliGRAM(s) Oral three times a day  influenza   Vaccine 0.5 milliLiter(s) IntraMuscular once  lactulose Syrup 20 Gram(s) Oral every 6 hours  losartan 50 milliGRAM(s) Oral daily  polyethylene glycol 3350 17 Gram(s) Oral daily  senna 2 Tablet(s) Oral at bedtime    MEDICATIONS  (PRN):  HYDROmorphone  Injectable 1 milliGRAM(s) IV Push every 4 hours PRN Moderate and Severe Pain      Vital Signs Last 24 Hrs  T(C): 36.7 (11 Sep 2018 13:11), Max: 36.7 (11 Sep 2018 13:11)  T(F): 98 (11 Sep 2018 13:11), Max: 98 (11 Sep 2018 13:11)  HR: 90 (11 Sep 2018 13:11) (90 - 90)  BP: 117/88 (11 Sep 2018 13:11) (117/88 - 152/77)  BP(mean): --  RR: 18 (11 Sep 2018 05:28) (18 - 18)  SpO2: 100% (11 Sep 2018 13:11) (96% - 100%)  CAPILLARY BLOOD GLUCOSE        I&O's Summary      Constitutional: No fever, fatigue  Skin: No rash.  Eyes: No recent vision problems or eye pain.  ENT: No congestion, ear pain, or sore throat.  Cardiovascular: No chest pain or palpation.  Respiratory: No cough, shortness of breath, congestion, or wheezing.  Gastrointestinal: No abdominal pain, nausea, vomiting, or diarrhea.  Genitourinary: No dysuria.  Musculoskeletal: No joint swelling. back pain   Neurologic: No headache.    PHYSICAL EXAM:  GENERAL: NAD  EYES: EOMI, PERRLA  NECK: Supple, No JVD  CHEST/LUNG: dec breath sounds at bases  HEART:  S1 , S2 +  ABDOMEN: sof,t bs+  EXTREMITIES:  edema+ , erythema+  NEUROLOGY:alert awake oriented      LABS:                        11.9   9.47  )-----------( 324      ( 11 Sep 2018 05:30 )             37.6     09-11    139  |  100  |  15  ----------------------------<  136<H>  3.7   |  23  |  0.89    Ca    10.1      11 Sep 2018 05:30  Mg     2.1         TPro  8.1  /  Alb  3.9  /  TBili  0.5  /  DBili  x   /  AST  13  /  ALT  7   /  AlkPhos  75  09-10          Urinalysis Basic - ( 11 Sep 2018 02:05 )    Color: YELLOW / Appearance: Lt TURBID / S.018 / pH: 6.5  Gluc: NEGATIVE / Ketone: TRACE  / Bili: NEGATIVE / Urobili: NORMAL   Blood: MODERATE / Protein: 100 / Nitrite: NEGATIVE   Leuk Esterase: LARGE / RBC: >50 / WBC >50   Sq Epi: OCC / Non Sq Epi: x / Bacteria: NEGATIVE        RADIOLOGY & ADDITIONAL TESTS:    Imaging Personally Reviewed:    Consultant(s) Notes Reviewed:      Care Discussed with Consultants/Other Providers:

## 2018-09-11 NOTE — CHART NOTE - NSCHARTNOTEFT_GEN_A_CORE
Patient Age: 73    Patient Gender: Female    Procedure (including site/side if known):Paraspinal mass biopsy    Diagnosis/Indication: Paraspinal mass, Uterine mass    Interventional Radiology Attending Physician: Dr. Geiger    Ordering Attending Physician: Dr. Navarro    Pertinent medical history: HTN, Spinal stenosis    Pertinent Labs:                      11.9   9.47  )-----------( 324      ( 11 Sep 2018 05:30 )             37.6   09-11    139  |  100  |  15  ----------------------------<  136<H>  3.7   |  23  |  0.89    Ca    10.1      11 Sep 2018 05:30  Mg     2.1     09-11    TPro  8.1  /  Alb  3.9  /  TBili  0.5  /  DBili  x   /  AST  13  /  ALT  7   /  AlkPhos  75  09-10        Patient and Family aware? Yes      Attending/Resident/NP/NUNU Casey    Contact:           [id number]                  Date:  9/11/18                               time: [id number]

## 2018-09-11 NOTE — PROGRESS NOTE ADULT - PROBLEM SELECTOR PLAN 2
Signs of metastatic  cancer on outpatient imaging  - gyn consult reviewed  - CT a/p image uploaded in [facility], will check CT chest for further staging  - onc iselaal

## 2018-09-11 NOTE — CHART NOTE - NSCHARTNOTEFT_GEN_A_CORE
GYN Follow up Note    Patient seen and imaging reviewed with Radiology.  Patient has uterine mass with paraspinal lesions and pulmonary nodules consistent with metastatic disease.    Recommend follow up Neurosurgical evaluation, IR biopsy of paraspinal mass, will follow up medical oncology consultation    Patient seen and examined with Dr. Moore and discussed with primary team    Fabiana PGY4

## 2018-09-11 NOTE — CONSULT NOTE ADULT - PROBLEM SELECTOR RECOMMENDATION 9
-CT scan/MRI showing uterine mass with extensive paraspinal lesions, no evidence of cord compression.   -Will need tissue biopsy for diagnosis and management. Spoke to the team, possible IR guided biopsy of the para spinal mass.  -Further recommendation based on the result of the biopsy, will follow up at [facility].

## 2018-09-11 NOTE — CONSULT NOTE ADULT - PROBLEM SELECTOR RECOMMENDATION 3
-patient has baseline chronic back pain due to lumbar stenosis, worsening pain likely from the paraspinal mass.  -Neurosurgery evaluation.   -Continue with pain regimen and bowel regimen.     D/W team.    Please page at [phone] with questions or concerns.
-per primary and GYN

## 2018-09-11 NOTE — CONSULT NOTE ADULT - ASSESSMENT
73F with low back pain found to have L2 erosive mass associated with large uterine mass and 2 lung masses presumably metastatic endometrial carcinoma.     -No acute neurosurgical intervention indicated at this time  -Bedrest  -Pain control  -Recommend CT chest w/ contrast and CT C-spine w/o contrast   -Patient should obtain her MRI L-spine done at [facility] or obtain new MRI Lspine w/ contrast  -TLSO brace for pain control  -Neurochecks q4h  -Lower extremity dopplers  -Oncology evaluation  -D/w attending
74yo G0 postmenopausal woman presenting to the ED with lower back pain that has worsen in the past couple of days. She was evaluated by an outside ortho 1 weeks ago and had an outside MRI and CT that was concerning for uterine malignancy. She was found to vaginal spotting (AUB) over the past couple of years.
Ms. Son is a 74 y/o F with h/o back pain due to lumbar stenosis. Patient recently found to have soft tissue mass, and was supposed to see an oncologist but came to the hospital due to increased pain.
72 y/o F with HTN, lumbar spinal stenosis and prediabetes admitted for severe back pain with findings of metastatic GYN malignancy with left leg swelling/cellulitis with leukocytosis

## 2018-09-11 NOTE — CONSULT NOTE ADULT - ATTENDING COMMENTS
The patient was seen and examined today with the fellow, Dr. Pineda, and I agree with the History, Physical Exam and Plan in the record. Labs and imaging reviewed by me. Patient sitting with legs elevated, noted 3++edema b/l to knee with RLE more erythematous than LLE, bandaged. Exam otherwise noncontributory. Discussed completing staging imaging and biopsy for tissue diagnosis for this metastatic malignancy, with plan for OP follow up with oncology once biopsy results obtained to discuss next steps/treatment.
Shaheen Champion  Attending Physician   Division of Infectious Disease  Pager #[phone]  After 5pm/weekend or no response, call #[phone]

## 2018-09-11 NOTE — CONSULT NOTE ADULT - SUBJECTIVE AND OBJECTIVE BOX
HPI:  72 y/o F with HTN, prediabetes and chronic back pain 2/2 spinal stenosis p/w worsening of chronic back pain.  Pt reports pain in lower back with R sided sciatica since early 2017.  She had imaging in 2017 which revealed lumbar spinal stenosis.  She had been prescribed gabapentin as well as percocet which she only needed sparingly.  Pt recently has developed worsening lower back pain with paresthesia radiating to L thigh.  Pain makes it difficult to walk, and she feels that her L leg is weaker.  Pt has been unable to lay flat, or stand 2/2 the pain and has spent most of her time sitting.  She has been taking percocet without relief.  She has chronic incontinence, but no new symptoms.  She has been constipated which she attributes to taking percocet more frequently.  She has not had foot numbness or saddle anesthesia.  She had imaging done 2 weeks ago including MRI which showed an infiltrative process at L2 vertebral body, lumbar spinal stenosis and RP soft tissue mass.  CT a/p showed signs suspicious of uterine ca with mets to bone and lung.  Pt was referred to an oncologist with appointment scheduled later this week, but due to increasing severity of pain, she was advised to come to the ED by her PCP.       Of note, pt had D+C for abnormal uterine bleeding in early 2000's which showed benign disease.     In the ED, pt was given Dilaudid 1mg IV x 2 which provided relief of pain. (10 Sep 2018 19:39)    PAST MEDICAL & SURGICAL HISTORY:  Lumbar spinal stenosis  Prediabetes  Essential (primary) hypertension  S/P right knee arthroscopy  S/P dilation and curettage: Early 2000&#x27;s for DUB, findings benign.  S/P cholecystectomy    FAMILY HISTORY:  Family history of acute myocardial infarction (Mother): mother  Family history of diabetes mellitus (Mother, Sibling)    Social History  MEDICATIONS  (STANDING):  enoxaparin Injectable 40 milliGRAM(s) SubCutaneous every 24 hours  influenza   Vaccine 0.5 milliLiter(s) IntraMuscular once  losartan 50 milliGRAM(s) Oral daily    MEDICATIONS  (PRN):  HYDROmorphone  Injectable 1 milliGRAM(s) IV Push every 4 hours PRN Severe Pain (7 - 10)  oxyCODONE    5 mG/acetaminophen 325 mG 1 Tablet(s) Oral every 6 hours PRN Moderate Pain (4 - 6)    No Known Allergies    REVIEW OF SYSTEMS      General:	    Skin/Breast:  	  Ophthalmologic:  	  ENMT:	    Respiratory and Thorax:  	  Cardiovascular:	    Gastrointestinal:	    Genitourinary:	    Musculoskeletal:	    Neurological:	    Psychiatric:	    Hematology/Lymphatics:	    Endocrine:	    Allergic/Immunologic:	  Vital Signs Last 24 Hrs  T(C): 36.4 (11 Sep 2018 05:28), Max: 36.8 (10 Sep 2018 19:14)  T(F): 97.5 (11 Sep 2018 05:28), Max: 98.2 (10 Sep 2018 19:14)  HR: 90 (11 Sep 2018 05:28) (90 - 103)  BP: 152/77 (11 Sep 2018 05:28) (127/68 - 189/76)  BP(mean): --  RR: 18 (11 Sep 2018 05:28) (16 - 18)  SpO2: 96% (11 Sep 2018 05:28) (96% - 100%)  Physical Examination  Constitutional: Alert, oriented X3  Eyes: Normal  ENMT: normal  Neck: No lymphadneopathy  Respiratory: b/l clear to auscultation  Cardiovascular: S1,S2,M0  Abdomen: Soft, non tender, BS present  Gastrointestinal: soft, non tender, BS present  Extremities: soft, no edema  Neurological: intact  Skin: no petechiae  Musculoskeletal: normal  Psychiatric: normal                            11.9   9.47  )-----------( 324      ( 11 Sep 2018 05:30 )             37.6     09-11    139  |  100  |  15  ----------------------------<  136<H>  3.7   |  23  |  0.89    Ca    10.1      11 Sep 2018 05:30  Mg     2.1     09-11    TPro  8.1  /  Alb  3.9  /  TBili  0.5  /  DBili  x   /  AST  13  /  ALT  7   /  AlkPhos  75  09-10      Radiology  Assessment and Plan HPI:    Ms. Son is a 72 y/o F with HTN, prediabetes and chronic back pain 2/2 spinal stenosis p/w worsening of chronic back pain.  Pt reports pain in lower back with R sided sciatica since early 2017.  She had imaging in 2017 which revealed lumbar spinal stenosis.  She had been prescribed gabapentin as well as percocet which she only needed sparingly.  Pt recently has developed worsening lower back pain with paresthesia radiating to L thigh.  Pain makes it difficult to walk, and she feels that her L leg is weaker.  Pt has been unable to lay flat, or stand 2/2 the pain and has spent most of her time sitting.  She has been taking percocet without relief.  She has chronic incontinence, but no new symptoms.  She has been constipated which she attributes to taking percocet more frequently.  She has not had foot numbness or saddle anesthesia.  She had imaging done 2 weeks ago including MRI which showed an infiltrative process at L2 vertebral body, lumbar spinal stenosis and RP soft tissue mass.  CT a/p showed signs suspicious of uterine ca with mets to bone and lung.  Pt was referred to an oncologist with appointment scheduled later this week, but due to increasing severity of pain, she was advised to come to the ED by her PCP.       Of note, pt had D+C for abnormal uterine bleeding in early 2000's which showed benign disease.       PAST MEDICAL & SURGICAL HISTORY:  Lumbar spinal stenosis  Prediabetes  Essential (primary) hypertension  S/P right knee arthroscopy  S/P dilation and curettage: Early 2000&#x27;s for DUB, findings benign.  S/P cholecystectomy    FAMILY HISTORY:  Family history of acute myocardial infarction (Mother): mother  Family history of diabetes mellitus (Mother, Sibling)    Social History  MEDICATIONS  (STANDING):  enoxaparin Injectable 40 milliGRAM(s) SubCutaneous every 24 hours  influenza   Vaccine 0.5 milliLiter(s) IntraMuscular once  losartan 50 milliGRAM(s) Oral daily    MEDICATIONS  (PRN):  HYDROmorphone  Injectable 1 milliGRAM(s) IV Push every 4 hours PRN Severe Pain (7 - 10)  oxyCODONE    5 mG/acetaminophen 325 mG 1 Tablet(s) Oral every 6 hours PRN Moderate Pain (4 - 6)    No Known Allergies    REVIEW OF SYSTEMS      General:	    Skin/Breast:  	  Ophthalmologic:  	  ENMT:	    Respiratory and Thorax:  	  Cardiovascular:	    Gastrointestinal:	    Genitourinary:	    Musculoskeletal:	    Neurological:	    Psychiatric:	    Hematology/Lymphatics:	    Endocrine:	    Allergic/Immunologic:	  Vital Signs Last 24 Hrs  T(C): 36.4 (11 Sep 2018 05:28), Max: 36.8 (10 Sep 2018 19:14)  T(F): 97.5 (11 Sep 2018 05:28), Max: 98.2 (10 Sep 2018 19:14)  HR: 90 (11 Sep 2018 05:28) (90 - 103)  BP: 152/77 (11 Sep 2018 05:28) (127/68 - 189/76)  BP(mean): --  RR: 18 (11 Sep 2018 05:28) (16 - 18)  SpO2: 96% (11 Sep 2018 05:28) (96% - 100%)  Physical Examination  Constitutional: Alert, oriented X3  Eyes: Normal  ENMT: normal  Neck: No lymphadneopathy  Respiratory: b/l clear to auscultation  Cardiovascular: S1,S2,M0  Abdomen: Soft, non tender, BS present  Gastrointestinal: soft, non tender, BS present  Extremities: soft, no edema  Neurological: intact  Skin: no petechiae  Musculoskeletal: normal  Psychiatric: normal                            11.9   9.47  )-----------( 324      ( 11 Sep 2018 05:30 )             37.6     09-11    139  |  100  |  15  ----------------------------<  136<H>  3.7   |  23  |  0.89    Ca    10.1      11 Sep 2018 05:30  Mg     2.1     09-11    TPro  8.1  /  Alb  3.9  /  TBili  0.5  /  DBili  x   /  AST  13  /  ALT  7   /  AlkPhos  75  09-10      Radiology  Assessment and Plan HPI:    Ms. Son is a 74 y/o F with HTN, prediabetes and chronic back pain 2/2 spinal stenosis p/w worsening of chronic back pain.  Pt reports pain in lower back with R sided sciatica since early 2017.  She had imaging in 2017 which revealed lumbar spinal stenosis.  She had been prescribed gabapentin as well as percocet which she only needed sparingly.  Pt recently has developed worsening lower back pain with paresthesia radiating to L thigh.  Pain makes it difficult to walk, and she feels that her L leg is weaker.  Pt has been unable to lay flat, or stand 2/2 the pain and has spent most of her time sitting.  She has been taking percocet without relief.  She has chronic incontinence, but no new symptoms.  She has been constipated which she attributes to taking percocet more frequently.  She has not had foot numbness or saddle anesthesia.  She had imaging done 2 weeks ago including MRI which showed an infiltrative process at L2 vertebral body, lumbar spinal stenosis and RP soft tissue mass.  CT a/p showed signs suspicious of uterine ca with mets to bone and lung.  Pt was referred to an oncologist with appointment scheduled later this week, but due to increasing severity of pain, she was advised to come to the ED by her PCP.       Of note, pt had D+C for abnormal uterine bleeding in early 2000's which showed benign disease.     Currently, she states her pain is better controlled but still exacerbated with any movement. She hasn't had bowel movement X 1 week. No chest pain, fever, chills, palpitation, sob, abdominal pain.      PAST MEDICAL & SURGICAL HISTORY:  Lumbar spinal stenosis  Prediabetes  Essential (primary) hypertension  S/P right knee arthroscopy  S/P dilation and curettage: Early 2000&#x27;s for DUB, findings benign.  S/P cholecystectomy    FAMILY HISTORY:  Family history of acute myocardial infarction (Mother): mother  Family history of diabetes mellitus (Mother, Sibling)    Social History  MEDICATIONS  (STANDING):  enoxaparin Injectable 40 milliGRAM(s) SubCutaneous every 24 hours  influenza   Vaccine 0.5 milliLiter(s) IntraMuscular once  losartan 50 milliGRAM(s) Oral daily    MEDICATIONS  (PRN):  HYDROmorphone  Injectable 1 milliGRAM(s) IV Push every 4 hours PRN Severe Pain (7 - 10)  oxyCODONE    5 mG/acetaminophen 325 mG 1 Tablet(s) Oral every 6 hours PRN Moderate Pain (4 - 6)    No Known Allergies    REVIEW OF SYSTEMS    10 points ROS is negative except for the ones in HPI.    Vital Signs Last 24 Hrs  T(C): 36.4 (11 Sep 2018 05:28), Max: 36.8 (10 Sep 2018 19:14)  T(F): 97.5 (11 Sep 2018 05:28), Max: 98.2 (10 Sep 2018 19:14)  HR: 90 (11 Sep 2018 05:28) (90 - 103)  BP: 152/77 (11 Sep 2018 05:28) (127/68 - 189/76)  BP(mean): --  RR: 18 (11 Sep 2018 05:28) (16 - 18)  SpO2: 96% (11 Sep 2018 05:28) (96% - 100%)    Physical Examination  Constitutional: Alert, oriented X3  Eyes: Normal  ENMT: normal  Neck: No lymphadenopathy  Respiratory: b/l clear to auscultation  Cardiovascular: S1,S2,M0  Abdomen: Soft, non tender, BS present  Gastrointestinal: soft, non tender, BS present  Extremities: soft, no edema  Neurological: intact  Skin: no petechiae  Musculoskeletal: normal  Psychiatric: normal                            11.9   9.47  )-----------( 324      ( 11 Sep 2018 05:30 )             37.6     09-11    139  |  100  |  15  ----------------------------<  136<H>  3.7   |  23  |  0.89    Ca    10.1      11 Sep 2018 05:30  Mg     2.1     09-11    TPro  8.1  /  Alb  3.9  /  TBili  0.5  /  DBili  x   /  AST  13  /  ALT  7   /  AlkPhos  75  09-10      Radiology  Assessment and Plan

## 2018-09-11 NOTE — CONSULT NOTE ADULT - PROBLEM SELECTOR RECOMMENDATION 2
-with cellulitis, on antibiotics.  -Recc duplex lower extremities.
-as above  -check dopplers to r/o DVT

## 2018-09-12 ENCOUNTER — INPATIENT (INPATIENT)
Facility: HOSPITAL | Age: 73
LOS: 20 days | Discharge: NURSING, REHAB OR LONG-TERM CARE | DRG: 457 | End: 2018-10-03
Attending: NEUROLOGICAL SURGERY | Admitting: PSYCHIATRY & NEUROLOGY
Payer: MEDICARE

## 2018-09-12 ENCOUNTER — TRANSCRIPTION ENCOUNTER (OUTPATIENT)
Age: 73
End: 2018-09-12

## 2018-09-12 ENCOUNTER — RESULT REVIEW (OUTPATIENT)
Age: 73
End: 2018-09-12

## 2018-09-12 VITALS
OXYGEN SATURATION: 98 % | SYSTOLIC BLOOD PRESSURE: 124 MMHG | HEART RATE: 98 BPM | TEMPERATURE: 98 F | RESPIRATION RATE: 18 BRPM | DIASTOLIC BLOOD PRESSURE: 55 MMHG

## 2018-09-12 VITALS
TEMPERATURE: 99 F | WEIGHT: 293 LBS | DIASTOLIC BLOOD PRESSURE: 59 MMHG | RESPIRATION RATE: 16 BRPM | HEIGHT: 67 IN | HEART RATE: 99 BPM | OXYGEN SATURATION: 95 % | SYSTOLIC BLOOD PRESSURE: 130 MMHG

## 2018-09-12 DIAGNOSIS — Z90.49 ACQUIRED ABSENCE OF OTHER SPECIFIED PARTS OF DIGESTIVE TRACT: Chronic | ICD-10-CM

## 2018-09-12 DIAGNOSIS — Z98.890 OTHER SPECIFIED POSTPROCEDURAL STATES: Chronic | ICD-10-CM

## 2018-09-12 DIAGNOSIS — I20 ANGINA PECTORIS: ICD-10-CM

## 2018-09-12 DIAGNOSIS — I10 ESSENTIAL (PRIMARY) HYPERTENSION: ICD-10-CM

## 2018-09-12 DIAGNOSIS — R09.89 OTHER SPECIFIED SYMPTOMS AND SIGNS INVOLVING THE CIRCULATORY AND RESPIRATORY SYSTEMS: ICD-10-CM

## 2018-09-12 LAB
ALBUMIN SERPL ELPH-MCNC: 3.5 G/DL — SIGNIFICANT CHANGE UP (ref 3.3–5)
ALP SERPL-CCNC: 76 U/L — SIGNIFICANT CHANGE UP (ref 40–120)
ALT FLD-CCNC: 5 U/L — LOW (ref 10–45)
ANION GAP SERPL CALC-SCNC: 14 MMOL/L — SIGNIFICANT CHANGE UP (ref 5–17)
AST SERPL-CCNC: 14 U/L — SIGNIFICANT CHANGE UP (ref 10–40)
BACTERIA UR CULT: SIGNIFICANT CHANGE UP
BASOPHILS # BLD AUTO: 0 K/UL — SIGNIFICANT CHANGE UP (ref 0–0.2)
BASOPHILS NFR BLD AUTO: 0.3 % — SIGNIFICANT CHANGE UP (ref 0–2)
BILIRUB SERPL-MCNC: 0.2 MG/DL — SIGNIFICANT CHANGE UP (ref 0.2–1.2)
BUN SERPL-MCNC: 11 MG/DL — SIGNIFICANT CHANGE UP (ref 7–23)
BUN SERPL-MCNC: 14 MG/DL — SIGNIFICANT CHANGE UP (ref 7–23)
CALCIUM SERPL-MCNC: 10 MG/DL — SIGNIFICANT CHANGE UP (ref 8.4–10.5)
CALCIUM SERPL-MCNC: 10.3 MG/DL — SIGNIFICANT CHANGE UP (ref 8.4–10.5)
CHLORIDE SERPL-SCNC: 100 MMOL/L — SIGNIFICANT CHANGE UP (ref 96–108)
CHLORIDE SERPL-SCNC: 102 MMOL/L — SIGNIFICANT CHANGE UP (ref 98–107)
CO2 SERPL-SCNC: 23 MMOL/L — SIGNIFICANT CHANGE UP (ref 22–31)
CO2 SERPL-SCNC: 26 MMOL/L — SIGNIFICANT CHANGE UP (ref 22–31)
CREAT SERPL-MCNC: 0.82 MG/DL — SIGNIFICANT CHANGE UP (ref 0.5–1.3)
CREAT SERPL-MCNC: 0.97 MG/DL — SIGNIFICANT CHANGE UP (ref 0.5–1.3)
EOSINOPHIL # BLD AUTO: 0 K/UL — SIGNIFICANT CHANGE UP (ref 0–0.5)
EOSINOPHIL NFR BLD AUTO: 0.3 % — SIGNIFICANT CHANGE UP (ref 0–6)
GLUCOSE SERPL-MCNC: 130 MG/DL — HIGH (ref 70–99)
GLUCOSE SERPL-MCNC: 138 MG/DL — HIGH (ref 70–99)
HCT VFR BLD CALC: 37.4 % — SIGNIFICANT CHANGE UP (ref 34.5–45)
HCT VFR BLD CALC: 38.6 % — SIGNIFICANT CHANGE UP (ref 34.5–45)
HGB BLD-MCNC: 11.7 G/DL — SIGNIFICANT CHANGE UP (ref 11.5–15.5)
HGB BLD-MCNC: 12.6 G/DL — SIGNIFICANT CHANGE UP (ref 11.5–15.5)
INR BLD: 1.13 — SIGNIFICANT CHANGE UP (ref 0.88–1.17)
LYMPHOCYTES # BLD AUTO: 0.7 K/UL — LOW (ref 1–3.3)
LYMPHOCYTES # BLD AUTO: 6.4 % — LOW (ref 13–44)
MCHC RBC-ENTMCNC: 27.5 PG — SIGNIFICANT CHANGE UP (ref 27–34)
MCHC RBC-ENTMCNC: 28.6 PG — SIGNIFICANT CHANGE UP (ref 27–34)
MCHC RBC-ENTMCNC: 31.3 % — LOW (ref 32–36)
MCHC RBC-ENTMCNC: 32.5 GM/DL — SIGNIFICANT CHANGE UP (ref 32–36)
MCV RBC AUTO: 87.8 FL — SIGNIFICANT CHANGE UP (ref 80–100)
MCV RBC AUTO: 87.9 FL — SIGNIFICANT CHANGE UP (ref 80–100)
MONOCYTES # BLD AUTO: 0.5 K/UL — SIGNIFICANT CHANGE UP (ref 0–0.9)
MONOCYTES NFR BLD AUTO: 4.2 % — SIGNIFICANT CHANGE UP (ref 2–14)
NEUTROPHILS # BLD AUTO: 9.7 K/UL — HIGH (ref 1.8–7.4)
NEUTROPHILS NFR BLD AUTO: 88.8 % — HIGH (ref 43–77)
NRBC # FLD: 0 — SIGNIFICANT CHANGE UP
PLATELET # BLD AUTO: 291 K/UL — SIGNIFICANT CHANGE UP (ref 150–400)
PLATELET # BLD AUTO: 329 K/UL — SIGNIFICANT CHANGE UP (ref 150–400)
PMV BLD: 9.6 FL — SIGNIFICANT CHANGE UP (ref 7–13)
POTASSIUM SERPL-MCNC: 3.9 MMOL/L — SIGNIFICANT CHANGE UP (ref 3.5–5.3)
POTASSIUM SERPL-MCNC: 4.2 MMOL/L — SIGNIFICANT CHANGE UP (ref 3.5–5.3)
POTASSIUM SERPL-SCNC: 3.9 MMOL/L — SIGNIFICANT CHANGE UP (ref 3.5–5.3)
POTASSIUM SERPL-SCNC: 4.2 MMOL/L — SIGNIFICANT CHANGE UP (ref 3.5–5.3)
PROT SERPL-MCNC: 7.3 G/DL — SIGNIFICANT CHANGE UP (ref 6–8.3)
PROTHROM AB SERPL-ACNC: 12.6 SEC — SIGNIFICANT CHANGE UP (ref 9.8–13.1)
RBC # BLD: 4.26 M/UL — SIGNIFICANT CHANGE UP (ref 3.8–5.2)
RBC # BLD: 4.39 M/UL — SIGNIFICANT CHANGE UP (ref 3.8–5.2)
RBC # FLD: 14.3 % — SIGNIFICANT CHANGE UP (ref 10.3–14.5)
RBC # FLD: 15 % — HIGH (ref 10.3–14.5)
SODIUM SERPL-SCNC: 138 MMOL/L — SIGNIFICANT CHANGE UP (ref 135–145)
SODIUM SERPL-SCNC: 140 MMOL/L — SIGNIFICANT CHANGE UP (ref 135–145)
SPECIMEN SOURCE: SIGNIFICANT CHANGE UP
WBC # BLD: 10.9 K/UL — HIGH (ref 3.8–10.5)
WBC # BLD: 9.5 K/UL — SIGNIFICANT CHANGE UP (ref 3.8–10.5)
WBC # FLD AUTO: 10.9 K/UL — HIGH (ref 3.8–10.5)
WBC # FLD AUTO: 9.5 K/UL — SIGNIFICANT CHANGE UP (ref 3.8–10.5)

## 2018-09-12 PROCEDURE — 88341 IMHCHEM/IMCYTCHM EA ADD ANTB: CPT | Mod: 26,59

## 2018-09-12 PROCEDURE — 88305 TISSUE EXAM BY PATHOLOGIST: CPT | Mod: 26

## 2018-09-12 PROCEDURE — 93970 EXTREMITY STUDY: CPT | Mod: 26

## 2018-09-12 PROCEDURE — 88342 IMHCHEM/IMCYTCHM 1ST ANTB: CPT | Mod: 26,59

## 2018-09-12 PROCEDURE — 71260 CT THORAX DX C+: CPT | Mod: 26

## 2018-09-12 PROCEDURE — 88360 TUMOR IMMUNOHISTOCHEM/MANUAL: CPT | Mod: 26

## 2018-09-12 PROCEDURE — 72132 CT LUMBAR SPINE W/DYE: CPT | Mod: 26

## 2018-09-12 PROCEDURE — 72126 CT NECK SPINE W/DYE: CPT | Mod: 26

## 2018-09-12 PROCEDURE — 88173 CYTOPATH EVAL FNA REPORT: CPT | Mod: 26

## 2018-09-12 PROCEDURE — 20206 BIOPSY MUSCLE PERQ NEEDLE: CPT

## 2018-09-12 PROCEDURE — 72129 CT CHEST SPINE W/DYE: CPT | Mod: 26

## 2018-09-12 PROCEDURE — 77012 CT SCAN FOR NEEDLE BIOPSY: CPT | Mod: 26

## 2018-09-12 RX ORDER — DEXTROSE 50 % IN WATER 50 %
12.5 SYRINGE (ML) INTRAVENOUS ONCE
Qty: 0 | Refills: 0 | Status: DISCONTINUED | OUTPATIENT
Start: 2018-09-12 | End: 2018-09-18

## 2018-09-12 RX ORDER — CEFAZOLIN SODIUM 1 G
1000 VIAL (EA) INJECTION EVERY 8 HOURS
Qty: 0 | Refills: 0 | Status: DISCONTINUED | OUTPATIENT
Start: 2018-09-12 | End: 2018-09-18

## 2018-09-12 RX ORDER — DEXTROSE 50 % IN WATER 50 %
15 SYRINGE (ML) INTRAVENOUS ONCE
Qty: 0 | Refills: 0 | Status: DISCONTINUED | OUTPATIENT
Start: 2018-09-12 | End: 2018-09-18

## 2018-09-12 RX ORDER — DEXTROSE MONOHYDRATE, SODIUM CHLORIDE, AND POTASSIUM CHLORIDE 50; .745; 4.5 G/1000ML; G/1000ML; G/1000ML
1000 INJECTION, SOLUTION INTRAVENOUS
Qty: 0 | Refills: 0 | Status: DISCONTINUED | OUTPATIENT
Start: 2018-09-12 | End: 2018-09-14

## 2018-09-12 RX ORDER — ONDANSETRON 8 MG/1
4 TABLET, FILM COATED ORAL EVERY 6 HOURS
Qty: 0 | Refills: 0 | Status: DISCONTINUED | OUTPATIENT
Start: 2018-09-12 | End: 2018-09-18

## 2018-09-12 RX ORDER — LOSARTAN POTASSIUM 100 MG/1
1 TABLET, FILM COATED ORAL
Qty: 0 | Refills: 0 | COMMUNITY

## 2018-09-12 RX ORDER — HYDROMORPHONE HYDROCHLORIDE 2 MG/ML
1 INJECTION INTRAMUSCULAR; INTRAVENOUS; SUBCUTANEOUS
Qty: 0 | Refills: 0 | COMMUNITY
Start: 2018-09-12

## 2018-09-12 RX ORDER — LOSARTAN POTASSIUM 100 MG/1
50 TABLET, FILM COATED ORAL DAILY
Qty: 0 | Refills: 0 | Status: DISCONTINUED | OUTPATIENT
Start: 2018-09-12 | End: 2018-09-18

## 2018-09-12 RX ORDER — DEXTROSE 50 % IN WATER 50 %
25 SYRINGE (ML) INTRAVENOUS ONCE
Qty: 0 | Refills: 0 | Status: DISCONTINUED | OUTPATIENT
Start: 2018-09-12 | End: 2018-09-18

## 2018-09-12 RX ORDER — SODIUM CHLORIDE 9 MG/ML
1000 INJECTION, SOLUTION INTRAVENOUS
Qty: 0 | Refills: 0 | Status: DISCONTINUED | OUTPATIENT
Start: 2018-09-12 | End: 2018-09-18

## 2018-09-12 RX ORDER — DOCUSATE SODIUM 100 MG
1 CAPSULE ORAL
Qty: 0 | Refills: 0 | COMMUNITY
Start: 2018-09-12

## 2018-09-12 RX ORDER — ACETAMINOPHEN 500 MG
650 TABLET ORAL EVERY 6 HOURS
Qty: 0 | Refills: 0 | Status: DISCONTINUED | OUTPATIENT
Start: 2018-09-12 | End: 2018-09-18

## 2018-09-12 RX ORDER — POLYETHYLENE GLYCOL 3350 17 G/17G
17 POWDER, FOR SOLUTION ORAL
Qty: 0 | Refills: 0 | COMMUNITY
Start: 2018-09-12

## 2018-09-12 RX ORDER — CEFAZOLIN SODIUM 1 G
1 VIAL (EA) INJECTION
Qty: 0 | Refills: 0 | COMMUNITY
Start: 2018-09-12

## 2018-09-12 RX ORDER — SENNA PLUS 8.6 MG/1
2 TABLET ORAL
Qty: 0 | Refills: 0 | COMMUNITY
Start: 2018-09-12

## 2018-09-12 RX ORDER — LACTULOSE 10 G/15ML
30 SOLUTION ORAL
Qty: 0 | Refills: 0 | COMMUNITY
Start: 2018-09-12

## 2018-09-12 RX ORDER — SENNA PLUS 8.6 MG/1
2 TABLET ORAL AT BEDTIME
Qty: 0 | Refills: 0 | Status: DISCONTINUED | OUTPATIENT
Start: 2018-09-12 | End: 2018-09-18

## 2018-09-12 RX ORDER — DOCUSATE SODIUM 100 MG
100 CAPSULE ORAL THREE TIMES A DAY
Qty: 0 | Refills: 0 | Status: DISCONTINUED | OUTPATIENT
Start: 2018-09-12 | End: 2018-09-18

## 2018-09-12 RX ORDER — INSULIN LISPRO 100/ML
VIAL (ML) SUBCUTANEOUS EVERY 6 HOURS
Qty: 0 | Refills: 0 | Status: DISCONTINUED | OUTPATIENT
Start: 2018-09-12 | End: 2018-09-13

## 2018-09-12 RX ORDER — LACTULOSE 10 G/15ML
20 SOLUTION ORAL EVERY 6 HOURS
Qty: 0 | Refills: 0 | Status: DISCONTINUED | OUTPATIENT
Start: 2018-09-12 | End: 2018-09-18

## 2018-09-12 RX ORDER — DEXAMETHASONE 0.5 MG/5ML
4 ELIXIR ORAL EVERY 6 HOURS
Qty: 0 | Refills: 0 | Status: DISCONTINUED | OUTPATIENT
Start: 2018-09-12 | End: 2018-09-18

## 2018-09-12 RX ORDER — GLUCAGON INJECTION, SOLUTION 0.5 MG/.1ML
1 INJECTION, SOLUTION SUBCUTANEOUS ONCE
Qty: 0 | Refills: 0 | Status: DISCONTINUED | OUTPATIENT
Start: 2018-09-12 | End: 2018-09-18

## 2018-09-12 RX ADMIN — Medication 100 MILLIGRAM(S): at 06:16

## 2018-09-12 RX ADMIN — DEXTROSE MONOHYDRATE, SODIUM CHLORIDE, AND POTASSIUM CHLORIDE 75 MILLILITER(S): 50; .745; 4.5 INJECTION, SOLUTION INTRAVENOUS at 23:53

## 2018-09-12 RX ADMIN — HYDROMORPHONE HYDROCHLORIDE 1 MILLIGRAM(S): 2 INJECTION INTRAMUSCULAR; INTRAVENOUS; SUBCUTANEOUS at 18:02

## 2018-09-12 RX ADMIN — HYDROMORPHONE HYDROCHLORIDE 1 MILLIGRAM(S): 2 INJECTION INTRAMUSCULAR; INTRAVENOUS; SUBCUTANEOUS at 02:45

## 2018-09-12 RX ADMIN — HYDROMORPHONE HYDROCHLORIDE 1 MILLIGRAM(S): 2 INJECTION INTRAMUSCULAR; INTRAVENOUS; SUBCUTANEOUS at 12:30

## 2018-09-12 RX ADMIN — HYDROMORPHONE HYDROCHLORIDE 1 MILLIGRAM(S): 2 INJECTION INTRAMUSCULAR; INTRAVENOUS; SUBCUTANEOUS at 02:30

## 2018-09-12 RX ADMIN — Medication 100 MILLIGRAM(S): at 22:46

## 2018-09-12 RX ADMIN — Medication 100 MILLIGRAM(S): at 14:32

## 2018-09-12 RX ADMIN — HYDROMORPHONE HYDROCHLORIDE 1 MILLIGRAM(S): 2 INJECTION INTRAMUSCULAR; INTRAVENOUS; SUBCUTANEOUS at 18:17

## 2018-09-12 RX ADMIN — LOSARTAN POTASSIUM 50 MILLIGRAM(S): 100 TABLET, FILM COATED ORAL at 14:32

## 2018-09-12 RX ADMIN — HYDROMORPHONE HYDROCHLORIDE 1 MILLIGRAM(S): 2 INJECTION INTRAMUSCULAR; INTRAVENOUS; SUBCUTANEOUS at 06:37

## 2018-09-12 RX ADMIN — HYDROMORPHONE HYDROCHLORIDE 1 MILLIGRAM(S): 2 INJECTION INTRAMUSCULAR; INTRAVENOUS; SUBCUTANEOUS at 12:12

## 2018-09-12 RX ADMIN — HYDROMORPHONE HYDROCHLORIDE 1 MILLIGRAM(S): 2 INJECTION INTRAMUSCULAR; INTRAVENOUS; SUBCUTANEOUS at 06:52

## 2018-09-12 NOTE — CHART NOTE - NSCHARTNOTEFT_GEN_A_CORE
Notified by neurosurgery team that patient is to be transferred  to [facility] for separation surgery of spine mass. Transfer center was contacted and the discharge and transfer note was prepped.  Saw patient at bedside to obtain consent for transfer. Patient seemed overwhelmed when discussing the transfer- at this time pt is refusing to go to [facility]. Attempted to explain risks of not being transferred for further treatment/mgmt, however, patient refused to discuss the subject further and states she has to discuss situation with sister. Informed neurosurgery team and Dr. Navarro, will follow up additional recs. Will continue to monitor.

## 2018-09-12 NOTE — DISCHARGE NOTE ADULT - CARE PROVIDER_API CALL
Rosi Caceres (DO), Neurological Surgery  [address]  [place] [address]  Phone: [phone]  Fax: [phone]

## 2018-09-12 NOTE — DISCHARGE NOTE ADULT - PLAN OF CARE
A CT of the spine showed tumor of the L2 vertebral body. A biopsy was performed. You are being transferred to [facility] for further evaluation and surgery. You were started on IV antibiotics. Continue antibiotics. Doppler of lower extremity was performed and was negative for clots. Follow up with provider at [facility]. You were given pain medications to help with your low back pain. Please follow up with provider at [facility] for further pain management. You were being followed by oncology team here at [facility]. Please follow up with the provider at [facility] for further evaluation/management. Continue blood pressure medication regimen as directed. Monitor for any visual changes, headaches or dizziness.  Monitor blood pressure regularly.  Follow up with your provider for further management for high blood pressure. transfer to [facility] You were started on IV antibiotics. Continue antibiotics as directed. Doppler of lower extremity was performed and was negative for clots. Follow up with provider at [facility].

## 2018-09-12 NOTE — DISCHARGE NOTE ADULT - CARE PLAN
Principal Discharge DX:	Acute bilateral low back pain without sciatica  Assessment and plan of treatment:	A CT of the spine showed tumor of the L2 vertebral body. A biopsy was performed. You are being transferred to [facility] for further evaluation and surgery.  Secondary Diagnosis:	Left leg cellulitis  Assessment and plan of treatment:	You were started on IV antibiotics. Continue antibiotics. Doppler of lower extremity was performed and was negative for clots. Follow up with provider at [facility].  Secondary Diagnosis:	Lumbar spinal stenosis  Assessment and plan of treatment:	You were given pain medications to help with your low back pain. Please follow up with provider at [facility] for further pain management.  Secondary Diagnosis:	Metastatic cancer  Assessment and plan of treatment:	You were being followed by oncology team here at [facility]. Please follow up with the provider at [facility] for further evaluation/management.  Secondary Diagnosis:	Essential (primary) hypertension  Assessment and plan of treatment:	Continue blood pressure medication regimen as directed. Monitor for any visual changes, headaches or dizziness.  Monitor blood pressure regularly.  Follow up with your provider for further management for high blood pressure. Principal Discharge DX:	Acute bilateral low back pain without sciatica  Goal:	transfer to [facility]  Assessment and plan of treatment:	A CT of the spine showed tumor of the L2 vertebral body. A biopsy was performed. You are being transferred to [facility] for further evaluation and surgery.  Secondary Diagnosis:	Left leg cellulitis  Assessment and plan of treatment:	You were started on IV antibiotics. Continue antibiotics as directed. Doppler of lower extremity was performed and was negative for clots. Follow up with provider at [facility].  Secondary Diagnosis:	Lumbar spinal stenosis  Assessment and plan of treatment:	You were given pain medications to help with your low back pain. Please follow up with provider at [facility] for further pain management.  Secondary Diagnosis:	Metastatic cancer  Assessment and plan of treatment:	You were being followed by oncology team here at [facility]. Please follow up with the provider at [facility] for further evaluation/management.  Secondary Diagnosis:	Essential (primary) hypertension  Assessment and plan of treatment:	Continue blood pressure medication regimen as directed. Monitor for any visual changes, headaches or dizziness.  Monitor blood pressure regularly.  Follow up with your provider for further management for high blood pressure.

## 2018-09-12 NOTE — DISCHARGE NOTE ADULT - MEDICATION SUMMARY - MEDICATIONS TO TAKE
I will START or STAY ON the medications listed below when I get home from the hospital:    Percocet 5/325 oral tablet  -- 1 tab(s) by mouth every 6 hours, As Needed  -- Indication: For Pain    HYDROmorphone  -- 1 milligram(s) intravenously every 4 hours, As Needed for Moderate and Severe Pain   -- Indication: For Pain    losartan 50 mg oral tablet  -- 1 tab(s) by mouth once a day  -- Indication: For HTN    ceFAZolin 1 g intravenous injection  -- 1 gram(s) intravenous every 8 hours  -- Indication: For Cellulitis of LLE     docusate sodium 100 mg oral capsule  -- 1 cap(s) by mouth 3 times a day  -- Indication: For constipation    lactulose 10 g/15 mL oral syrup  -- 30 milliliter(s) by mouth every 6 hours  -- Indication: For constipation    polyethylene glycol 3350 oral powder for reconstitution  -- 17 gram(s) by mouth once a day  -- Indication: For constipation    senna oral tablet  -- 2 tab(s) by mouth once a day (at bedtime)  -- Indication: For constipation I will START or STAY ON the medications listed below when I get home from the hospital:    HYDROmorphone  -- 1 milligram(s) IV push every 4 hours, As Needed for Moderate and Severe Pain, hold for oversedation/lethargy or if RR <12    -- Indication: For Back pain    Percocet 5/325 oral tablet  -- 1 tab(s) by mouth every 6 hours, As Needed, hold for oversedation/lethargy or if RR <12   -- Indication: For Back pain     losartan 50 mg oral tablet  -- 1 tab(s) by mouth once a day, hold if SBP <110   -- Indication: For Essential (primary) hypertension    ceFAZolin 1 g intravenous injection  -- 1 gram(s) intravenous every 8 hours  -- Indication: For Cellulitis of LLE     docusate sodium 100 mg oral capsule  -- 1 cap(s) by mouth 3 times a day  -- Indication: For constipation    polyethylene glycol 3350 oral powder for reconstitution  -- 17 gram(s) by mouth once a day  -- Indication: For constipation    senna oral tablet  -- 2 tab(s) by mouth once a day (at bedtime)  -- Indication: For constipation     lactulose 10 g/15 mL oral syrup  -- 30 milliliter(s) by mouth every 6 hours, until pt has bm   -- Indication: For constipation

## 2018-09-12 NOTE — DISCHARGE NOTE ADULT - HOSPITAL COURSE
72 y/o F with HTN, prediabetes and chronic back pain 2/2 spinal stenosis p/w worsening of chronic back pain. Outpatient CT and MRI showed findings suspicious of metastatic GYN cancer.      Acute bilateral low back pain with left-sided sciatica.    - Dilaudid/percocet prn for pain ctrl while hospitalized   - neurosurgery consult reviewed  - TLSO brace  - neuro checks q4.   - S/p IR mass biopsy, neurosurgery to transfer patient to [facility] for separation surgery     Metastatic cancer-Uterine mass, retroperitoneal mass   - gyn consult reviewed  - CT a/p image uploaded in sunrise, will check CT chest for further staging. MRI Lspine uploaded  -Onc consulted -rec. paraspinal mass bx, Nsx consult  -Nsx consulted-Rec. TLSO brace, CT c-spine, MRI L-spine     LLE cellulitis  -ID consulted-started on Anceg 1g q 8hrs  -LE elevation  -B/L LE doppler--> negative for clots    Essential (primary) hypertension.   - continue losartan.     Prophylactic measure.    -Lovenox for DVT PPx.     Patient being transferred to [facility] for mass separation surgery, accepted under Dr. Caceres 72 y/o F with HTN, prediabetes and chronic back pain 2/2 spinal stenosis p/w worsening of chronic back pain. Outpatient CT and MRI showed findings suspicious of metastatic GYN cancer.      Acute bilateral low back pain with left-sided sciatica.    - Dilaudid/percocet prn for pain ctrl while hospitalized   - neurosurgery consult reviewed  - TLSO brace  - neuro checks q4.   - S/p IR mass biopsy, neurosurgery to transfer patient to [facility] for separation surgery   - CT C/T/L spine: CT chest shows worsening mets, CT spine: Abnormal lytic lesion involving the L2 vertebral body of the paraspinal epidural extension of tumor seen. Consistent with mets.     Metastatic cancer-Uterine mass, retroperitoneal mass   - gyn consult reviewed  - CT a/p image uploaded in sunrise, will check CT chest for further staging. MRI Lspine uploaded  -Onc consulted -rec. paraspinal mass bx, Nsx consult  -Nsx consulted-Rec. TLSO brace (ordered), MRI L-spine   - CT C/T/L spine: CT chest shows worsening mets, CT spine: Abnormal lytic lesion involving the L2 vertebral body of the paraspinal epidural extension of tumor seen. Consistent with mets.     LLE cellulitis  -ID consulted-started on Anceg 1g q 8hrs  -LE elevation  -B/L LE doppler--> negative for clots    Essential (primary) hypertension.   - continue losartan, BP monitored per routine, parameters in place.     Prophylactic measure.    -Lovenox for DVT PPx.     Patient being transferred to [facility] for mass separation surgery, accepted under Dr. Caceres, pt cleared for transfer to [facility] per Dr. Navarro, 72 y/o F with HTN, prediabetes and chronic back pain 2/2 spinal stenosis p/w worsening of chronic back pain. Outpatient CT and MRI showed findings suspicious of metastatic GYN cancer.      Acute bilateral low back pain with left-sided sciatica.    - Dilaudid/percocet prn for pain ctrl while hospitalized   - neurosurgery consult reviewed  - TLSO brace  - neuro checks q4.   - S/p IR mass biopsy, neurosurgery to transfer patient to [facility] for separation surgery   - CT C/T/L spine: CT chest shows worsening mets, CT spine: Abnormal lytic lesion involving the L2 vertebral body of the paraspinal epidural extension of tumor seen. Consistent with mets.     Metastatic cancer-Uterine mass, retroperitoneal mass   - gyn consult reviewed  - CT a/p image uploaded in sunrise, will check CT chest for further staging. MRI Lspine uploaded  -Onc consulted -rec. paraspinal mass bx, Nsx consult  -Nsx consulted-Rec. TLSO brace (ordered), MRI L-spine   - CT C/T/L spine: CT chest shows worsening mets, CT spine: Abnormal lytic lesion involving the L2 vertebral body of the paraspinal epidural extension of tumor seen. Consistent with mets.     LLE cellulitis  -ID consulted-started on Anceg 1g q 8hrs  -LE elevation  -B/L LE doppler--> negative for clots    Essential (primary) hypertension.   - continue losartan, BP monitored per routine, parameters in place.     Prophylactic measure.    -Lovenox for DVT PPx.     Patient being transferred to [facility] for mass separation surgery, accepted under Dr. Caceres, pt cleared for transfer to [facility] per Dr. Navarro,     pt was seen and examine by me around 4 pm today   discussed management plan in detail in length with pt , pt in agreement with management plan

## 2018-09-12 NOTE — DISCHARGE NOTE ADULT - MEDICATION SUMMARY - MEDICATIONS TO CHANGE
done
I will SWITCH the dose or number of times a day I take the medications listed below when I get home from the hospital:  None

## 2018-09-12 NOTE — DISCHARGE NOTE ADULT - ADDITIONAL INSTRUCTIONS
You are being transferred to [facility] for further evaluation and surgery You are being transferred to [facility] for further evaluation and surgery under Dr. Caceres

## 2018-09-12 NOTE — H&P ADULT - NSHPLABSRESULTS_GEN_ALL_CORE
IMPRESSION: A few bilateral pulmonary nodules as above worrisome for   metastatic disease.                  SKIP SANDERS M.D. ATTENDING RADIOLOGIST  This document has been electronically signed. Sep 12 2018 10:16AM

## 2018-09-12 NOTE — PHYSICAL THERAPY INITIAL EVALUATION ADULT - GAIT DEVIATIONS NOTED, PT EVAL
decreased ericka/decreased weight-shifting ability/decreased step length/decreased stride length/increased stride width/increased time in double stance

## 2018-09-12 NOTE — PHYSICAL THERAPY INITIAL EVALUATION ADULT - ADDITIONAL COMMENTS
Patient reports she lives alone in an apartment, +elevator. Patient reports she was previously independent in all ADLs and used a cane prior to admission.    Patient was left sitting in chair as found, all lines/tubes intact and call bell within reach, RN aware

## 2018-09-12 NOTE — PHYSICAL THERAPY INITIAL EVALUATION ADULT - RANGE OF MOTION EXAMINATION, REHAB EVAL
All spinal precautions maintained./bilateral upper extremity ROM was WFL (within functional limits)/bilateral lower extremity ROM was WFL (within functional limits)

## 2018-09-12 NOTE — DISCHARGE NOTE ADULT - PATIENT PORTAL LINK FT
You can access the [facility] Patient Portal, offered by [facility], by registering with the following website: [email or web address]

## 2018-09-12 NOTE — H&P ADULT - HISTORY OF PRESENT ILLNESS
Ms. Son is a 74 y/o F with HTN, prediabetes and chronic back pain 2/2 spinal stenosis p/w worsening of chronic back pain.  Pt reports pain in lower back with R sided sciatica since early 2017.  She had imaging in 2017 which revealed lumbar spinal stenosis.  She had been prescribed gabapentin as well as percocet which she only needed sparingly.  Pt recently has developed worsening lower back pain with paresthesia radiating to L thigh.  Pain makes it difficult to walk, and she feels that her L leg is weaker.  Pt has been unable to lay flat, or stand 2/2 the pain and has spent most of her time sitting.  She has been taking percocet without relief.  She has chronic incontinence, but no new symptoms.  She has been constipated which she attributes to taking percocet more frequently.  She has not had foot numbness or saddle anesthesia.  She had imaging done 2 weeks ago including MRI which showed an infiltrative process at L2 vertebral body, lumbar spinal stenosis and RP soft tissue mass.  CT a/p showed signs suspicious of uterine ca with mets to bone and lung.  Pt was referred to an oncologist with appointment scheduled later this week, but due to increasing severity of pain, she was advised to come to the ED by her PCP.       At [facility] patient receieved CT guided biopsy. Tx to [facility] for further work up and management of spinal lesion

## 2018-09-12 NOTE — PHYSICAL THERAPY INITIAL EVALUATION ADULT - PERTINENT HX OF CURRENT PROBLEM, REHAB EVAL
Patient is a 73 year old female admitted to [facility] on 9/10 with worsening of chronic back pain. PMH includes: HTN, prediabetes and chronic back pain 2/2 spinal stenosis. CT of L/S: metastatic GYN malignancy.

## 2018-09-13 DIAGNOSIS — C55 MALIGNANT NEOPLASM OF UTERUS, PART UNSPECIFIED: ICD-10-CM

## 2018-09-13 DIAGNOSIS — Z51.5 ENCOUNTER FOR PALLIATIVE CARE: ICD-10-CM

## 2018-09-13 DIAGNOSIS — L03.90 CELLULITIS, UNSPECIFIED: ICD-10-CM

## 2018-09-13 DIAGNOSIS — M48.061 SPINAL STENOSIS, LUMBAR REGION WITHOUT NEUROGENIC CLAUDICATION: ICD-10-CM

## 2018-09-13 DIAGNOSIS — E66.01 MORBID (SEVERE) OBESITY DUE TO EXCESS CALORIES: ICD-10-CM

## 2018-09-13 DIAGNOSIS — Z01.818 ENCOUNTER FOR OTHER PREPROCEDURAL EXAMINATION: ICD-10-CM

## 2018-09-13 DIAGNOSIS — D49.9 NEOPLASM OF UNSPECIFIED BEHAVIOR OF UNSPECIFIED SITE: ICD-10-CM

## 2018-09-13 DIAGNOSIS — I10 ESSENTIAL (PRIMARY) HYPERTENSION: ICD-10-CM

## 2018-09-13 DIAGNOSIS — M89.9 DISORDER OF BONE, UNSPECIFIED: ICD-10-CM

## 2018-09-13 DIAGNOSIS — M54.5 LOW BACK PAIN: ICD-10-CM

## 2018-09-13 DIAGNOSIS — R73.03 PREDIABETES: ICD-10-CM

## 2018-09-13 LAB
APTT BLD: 32.1 SEC — SIGNIFICANT CHANGE UP (ref 27.5–37.4)
BLD GP AB SCN SERPL QL: NEGATIVE — SIGNIFICANT CHANGE UP
GLUCOSE BLDC GLUCOMTR-MCNC: 129 MG/DL — HIGH (ref 70–99)
GLUCOSE BLDC GLUCOMTR-MCNC: 145 MG/DL — HIGH (ref 70–99)
GLUCOSE BLDC GLUCOMTR-MCNC: 161 MG/DL — HIGH (ref 70–99)
GLUCOSE BLDC GLUCOMTR-MCNC: 167 MG/DL — HIGH (ref 70–99)
HBA1C BLD-MCNC: 6.1 % — HIGH (ref 4–5.6)
INR BLD: 1.13 RATIO — SIGNIFICANT CHANGE UP (ref 0.88–1.16)
PROTHROM AB SERPL-ACNC: 12.4 SEC — SIGNIFICANT CHANGE UP (ref 9.8–12.7)
RH IG SCN BLD-IMP: POSITIVE — SIGNIFICANT CHANGE UP

## 2018-09-13 PROCEDURE — 99223 1ST HOSP IP/OBS HIGH 75: CPT | Mod: GC

## 2018-09-13 PROCEDURE — 99223 1ST HOSP IP/OBS HIGH 75: CPT

## 2018-09-13 PROCEDURE — 99232 SBSQ HOSP IP/OBS MODERATE 35: CPT

## 2018-09-13 PROCEDURE — 72158 MRI LUMBAR SPINE W/O & W/DYE: CPT | Mod: 26

## 2018-09-13 RX ORDER — HYDROMORPHONE HYDROCHLORIDE 2 MG/ML
1 INJECTION INTRAMUSCULAR; INTRAVENOUS; SUBCUTANEOUS
Qty: 0 | Refills: 0 | Status: DISCONTINUED | OUTPATIENT
Start: 2018-09-13 | End: 2018-09-14

## 2018-09-13 RX ORDER — HYDROMORPHONE HYDROCHLORIDE 2 MG/ML
0.5 INJECTION INTRAMUSCULAR; INTRAVENOUS; SUBCUTANEOUS
Qty: 0 | Refills: 0 | Status: DISCONTINUED | OUTPATIENT
Start: 2018-09-13 | End: 2018-09-13

## 2018-09-13 RX ORDER — OXYCODONE HYDROCHLORIDE 5 MG/1
10 TABLET ORAL EVERY 4 HOURS
Qty: 0 | Refills: 0 | Status: DISCONTINUED | OUTPATIENT
Start: 2018-09-13 | End: 2018-09-13

## 2018-09-13 RX ORDER — ENOXAPARIN SODIUM 100 MG/ML
40 INJECTION SUBCUTANEOUS AT BEDTIME
Qty: 0 | Refills: 0 | Status: DISCONTINUED | OUTPATIENT
Start: 2018-09-13 | End: 2018-09-16

## 2018-09-13 RX ORDER — INSULIN LISPRO 100/ML
VIAL (ML) SUBCUTANEOUS AT BEDTIME
Qty: 0 | Refills: 0 | Status: DISCONTINUED | OUTPATIENT
Start: 2018-09-13 | End: 2018-09-18

## 2018-09-13 RX ORDER — INFLUENZA VIRUS VACCINE 15; 15; 15; 15 UG/.5ML; UG/.5ML; UG/.5ML; UG/.5ML
0.5 SUSPENSION INTRAMUSCULAR ONCE
Qty: 0 | Refills: 0 | Status: DISCONTINUED | OUTPATIENT
Start: 2018-09-13 | End: 2018-10-03

## 2018-09-13 RX ORDER — ONDANSETRON 8 MG/1
4 TABLET, FILM COATED ORAL ONCE
Qty: 0 | Refills: 0 | Status: DISCONTINUED | OUTPATIENT
Start: 2018-09-13 | End: 2018-09-13

## 2018-09-13 RX ORDER — OXYCODONE HYDROCHLORIDE 5 MG/1
5 TABLET ORAL EVERY 4 HOURS
Qty: 0 | Refills: 0 | Status: DISCONTINUED | OUTPATIENT
Start: 2018-09-13 | End: 2018-09-13

## 2018-09-13 RX ORDER — INSULIN LISPRO 100/ML
VIAL (ML) SUBCUTANEOUS
Qty: 0 | Refills: 0 | Status: DISCONTINUED | OUTPATIENT
Start: 2018-09-13 | End: 2018-09-18

## 2018-09-13 RX ORDER — DIPHENHYDRAMINE HCL 50 MG
25 CAPSULE ORAL ONCE
Qty: 0 | Refills: 0 | Status: COMPLETED | OUTPATIENT
Start: 2018-09-13 | End: 2018-09-14

## 2018-09-13 RX ADMIN — Medication 1: at 07:00

## 2018-09-13 RX ADMIN — LACTULOSE 20 GRAM(S): 10 SOLUTION ORAL at 00:12

## 2018-09-13 RX ADMIN — HYDROMORPHONE HYDROCHLORIDE 0.5 MILLIGRAM(S): 2 INJECTION INTRAMUSCULAR; INTRAVENOUS; SUBCUTANEOUS at 19:22

## 2018-09-13 RX ADMIN — HYDROMORPHONE HYDROCHLORIDE 0.5 MILLIGRAM(S): 2 INJECTION INTRAMUSCULAR; INTRAVENOUS; SUBCUTANEOUS at 19:29

## 2018-09-13 RX ADMIN — Medication 4 MILLIGRAM(S): at 00:12

## 2018-09-13 RX ADMIN — HYDROMORPHONE HYDROCHLORIDE 0.5 MILLIGRAM(S): 2 INJECTION INTRAMUSCULAR; INTRAVENOUS; SUBCUTANEOUS at 19:45

## 2018-09-13 RX ADMIN — ENOXAPARIN SODIUM 40 MILLIGRAM(S): 100 INJECTION SUBCUTANEOUS at 22:14

## 2018-09-13 RX ADMIN — HYDROMORPHONE HYDROCHLORIDE 0.5 MILLIGRAM(S): 2 INJECTION INTRAMUSCULAR; INTRAVENOUS; SUBCUTANEOUS at 20:00

## 2018-09-13 RX ADMIN — HYDROMORPHONE HYDROCHLORIDE 0.5 MILLIGRAM(S): 2 INJECTION INTRAMUSCULAR; INTRAVENOUS; SUBCUTANEOUS at 19:40

## 2018-09-13 RX ADMIN — DEXTROSE MONOHYDRATE, SODIUM CHLORIDE, AND POTASSIUM CHLORIDE 75 MILLILITER(S): 50; .745; 4.5 INJECTION, SOLUTION INTRAVENOUS at 20:07

## 2018-09-13 RX ADMIN — HYDROMORPHONE HYDROCHLORIDE 0.5 MILLIGRAM(S): 2 INJECTION INTRAMUSCULAR; INTRAVENOUS; SUBCUTANEOUS at 18:45

## 2018-09-13 RX ADMIN — Medication 100 MILLIGRAM(S): at 22:13

## 2018-09-13 RX ADMIN — HYDROMORPHONE HYDROCHLORIDE 1 MILLIGRAM(S): 2 INJECTION INTRAMUSCULAR; INTRAVENOUS; SUBCUTANEOUS at 22:14

## 2018-09-13 RX ADMIN — Medication 100 MILLIGRAM(S): at 22:12

## 2018-09-13 RX ADMIN — HYDROMORPHONE HYDROCHLORIDE 0.5 MILLIGRAM(S): 2 INJECTION INTRAMUSCULAR; INTRAVENOUS; SUBCUTANEOUS at 18:33

## 2018-09-13 RX ADMIN — HYDROMORPHONE HYDROCHLORIDE 0.5 MILLIGRAM(S): 2 INJECTION INTRAMUSCULAR; INTRAVENOUS; SUBCUTANEOUS at 19:06

## 2018-09-13 RX ADMIN — Medication 100 MILLIGRAM(S): at 15:24

## 2018-09-13 RX ADMIN — HYDROMORPHONE HYDROCHLORIDE 1 MILLIGRAM(S): 2 INJECTION INTRAMUSCULAR; INTRAVENOUS; SUBCUTANEOUS at 22:44

## 2018-09-13 RX ADMIN — LOSARTAN POTASSIUM 50 MILLIGRAM(S): 100 TABLET, FILM COATED ORAL at 07:00

## 2018-09-13 RX ADMIN — Medication 4 MILLIGRAM(S): at 07:00

## 2018-09-13 RX ADMIN — HYDROMORPHONE HYDROCHLORIDE 0.5 MILLIGRAM(S): 2 INJECTION INTRAMUSCULAR; INTRAVENOUS; SUBCUTANEOUS at 10:00

## 2018-09-13 RX ADMIN — Medication 4 MILLIGRAM(S): at 15:22

## 2018-09-13 RX ADMIN — Medication 1: at 00:12

## 2018-09-13 RX ADMIN — Medication 100 MILLIGRAM(S): at 07:00

## 2018-09-13 NOTE — CONSULT NOTE ADULT - SUBJECTIVE AND OBJECTIVE BOX
HPI: 73F with PMH of HTN, spinal stenosis, chronic back pain here with worsening pain. Pain started in early 2017 with R-sided sciatica, resulting in diagnosis of lumbar spinal stenosis, and has used gabapentin and percocet at home. Pain subsequently worsened, causing difficulty walking and difficulty being supine, and now has parasthesias radiating to her L thigh. Also with constipation. CT A/P showed concern for new metastatic uterine CA to bone and lung, and came to [facility] from [facility] for management of her spinal lesion.     PERTINENT PM/SXH:   Lumbar spinal stenosis  Prediabetes  Essential (primary) hypertension  No pertinent past medical history    S/P right knee arthroscopy  S/P dilation and curettage  S/P cholecystectomy  No significant past surgical history    FAMILY HISTORY:  Family history of acute myocardial infarction (Mother): mother  Family history of diabetes mellitus (Mother	, Sibling)    ITEMS NOT CHECKED ARE NOT PRESENT    SOCIAL HISTORY:   Significant other/partner:  [ ]    Children:  [ ]    Mosque/Spirituality:  Substance hx:  [ ]   Tobacco hx:  [X ] remote  Alcohol hx: [X ] occasional Drug use hx: [ ]  Home Opioid hx:  [X ] (I-Stop Reference No: [id number])  Living Situation: [X ]Home  [ ]Long term care  [ ]Rehab [ ]Other  Occupation:    ADVANCE DIRECTIVES:    DNR [ ]  MOLST  [ ]    Living Will  [ ]   DECISION MAKER(s):  [ ] Health Care Proxy(s)  [ ] Surrogate(s)  [ ] Guardian           Name(s) and Contact(s):    BASELINE (I)ADL(s) (prior to admission):  [place]: [ ]Total  [ ] Moderate [ ]Dependent    Allergies    No Known Allergies    Intolerances    MEDICATIONS  (STANDING):  ceFAZolin   IVPB 1000 milliGRAM(s) IV Intermittent every 8 hours  dexamethasone  Injectable 4 milliGRAM(s) IV Push every 6 hours  dextrose 5%. 1000 milliLiter(s) (50 mL/Hr) IV Continuous <Continuous>  dextrose 50% Injectable 12.5 Gram(s) IV Push once  dextrose 50% Injectable 25 Gram(s) IV Push once  dextrose 50% Injectable 25 Gram(s) IV Push once  docusate sodium 100 milliGRAM(s) Oral three times a day  enoxaparin Injectable 40 milliGRAM(s) SubCutaneous at bedtime  influenza   Vaccine 0.5 milliLiter(s) IntraMuscular once  insulin lispro (HumaLOG) corrective regimen sliding scale   SubCutaneous every 6 hours  lactulose Syrup 20 Gram(s) Oral every 6 hours  losartan 50 milliGRAM(s) Oral daily  sodium chloride 0.9% with potassium chloride 20 mEq/L 1000 milliLiter(s) (75 mL/Hr) IV Continuous <Continuous>    MEDICATIONS  (PRN):  acetaminophen   Tablet .. 650 milliGRAM(s) Oral every 6 hours PRN Temp greater or equal to 38C (100.4F), Mild Pain (1 - 3)  dextrose 40% Gel 15 Gram(s) Oral once PRN Blood Glucose LESS THAN 70 milliGRAM(s)/deciliter  glucagon  Injectable 1 milliGRAM(s) IntraMuscular once PRN Glucose LESS THAN 70 milligrams/deciliter  HYDROmorphone  Injectable 0.5 milliGRAM(s) IV Push every 3 hours PRN Breakthrough pain  ondansetron Injectable 4 milliGRAM(s) IV Push every 6 hours PRN Nausea and/or Vomiting  oxyCODONE    IR 5 milliGRAM(s) Oral every 4 hours PRN Moderate Pain (4 - 6)  oxyCODONE    IR 10 milliGRAM(s) Oral every 4 hours PRN Severe Pain (7 - 10)  senna 2 Tablet(s) Oral at bedtime PRN Constipation    PRESENT SYMPTOMS:   Source if other than patient:  [ ]Family   [ ]Team     Pain (Impact on QOL):    Location -         Minimal acceptable level (0-10 scale):                    Aggravating factors -  Quality -  Radiation -  Severity (0-10 scale) -    Timing -    PAIN AD Score:     [email or web address] (press ctrl +  left click to view)    Dyspnea:                           [ ]Mild [ ]Moderate [ ]Severe  Anxiety:                             [ ]Mild [ ]Moderate [ ]Severe  Fatigue:                             [ ]Mild [ ]Moderate [ ]Severe  Nausea:                             [ ]Mild [ ]Moderate [ ]Severe  Loss of appetite:              [ ]Mild [ ]Moderate [ ]Severe  Constipation:                    [ ]Mild [ ]Moderate [ ]Severe    Other Symptoms:  [ ]All other review of systems negative   [ ]Unable to obtain due to poor mentation     Karnofsky Performance Score/Palliative Performance Status Version 2:         %    PHYSICAL EXAM:  Vital Signs Last 24 Hrs  T(C): 36.6 (13 Sep 2018 09:10), Max: 37.1 (12 Sep 2018 19:45)  T(F): 97.8 (13 Sep 2018 09:10), Max: 98.7 (12 Sep 2018 19:45)  HR: 88 (13 Sep 2018 09:10) (80 - 100)  BP: 135/62 (13 Sep 2018 09:10) (124/55 - 165/87)  BP(mean): --  RR: 16 (13 Sep 2018 09:10) (16 - 18)  SpO2: 97% (13 Sep 2018 09:10) (95% - 100%) I&O's Summary    12 Sep 2018 07:01  -  13 Sep 2018 07:00  --------------------------------------------------------  IN: 0 mL / OUT: 500 mL / NET: -500 mL    13 Sep 2018 07:01  -  13 Sep 2018 13:06  --------------------------------------------------------  IN: 0 mL / OUT: 1150 mL / NET: -1150 mL        GENERAL:  [ ]Alert  [ ]Oriented x   [ ]Lethargic  [ ]Cachexia  [ ]Unarousable  [ ]Verbal  [ ]Non-Verbal    Behavioral:   [ ] Anxiety  [ ] Delirium [ ] Agitation [ ] Other    HEENT:  [ ]Normal   [ ]Dry mouth   [ ]ET Tube/Trach  [ ]Oral lesions    PULMONARY:   [ ]Clear [ ]Tachypnea  [ ]Audible excessive secretions   [ ]Rhonchi        [ ]Right [ ]Left [ ]Bilateral  [ ]Crackles        [ ]Right [ ]Left [ ]Bilateral  [ ]Wheezing     [ ]Right [ ]Left [ ]Bilateral    CARDIOVASCULAR:    [ ]Regular [ ]Irregular [ ]Tachy  [ ]Spencer [ ]Murmur [ ]Other    GASTROINTESTINAL:  [ ]Soft  [ ]Distended   [ ]+BS  [ ]Non tender [ ]Tender  [ ]PEG [ ]OGT/ NGT  Last BM:     GENITOURINARY:  [ ]Normal [ ] Incontinent   [ ]Oliguria/Anuria   [ ]Martinez    MUSCULOSKELETAL:   [ ]Normal   [ ]Weakness  [ ]Bed/Wheelchair bound [ ]Edema    NEUROLOGIC:   [ ]No focal deficits  [ ] Cognitive impairment  [ ] Dysphagia [ ]Dysarthria [ ] Paresis [ ]Other     SKIN:   [ ]Normal   [ ]Pressure ulcer(s)  [ ]Rash    CRITICAL CARE:  [ ] Shock Present  [ ]Septic [ ]Cardiogenic [ ]Neurologic [ ]Hypovolemic  [ ]  Vasopressors [ ]  Inotropes   [ ] Respiratory failure present  [ ] Acute  [ ] Chronic [ ] Hypoxic  [ ] Hypercarbic [ ] Other  [ ] Other organ failure     LABS: reviewed                        12.6   10.9  )-----------( 329      ( 12 Sep 2018 23:11 )             38.6   09-12    140  |  100  |  14  ----------------------------<  138<H>  4.2   |  26  |  0.97    Ca    10.3      12 Sep 2018 23:02    TPro  7.3  /  Alb  3.5  /  TBili  0.2  /  DBili  x   /  AST  14  /  ALT  5<L>  /  AlkPhos  76  09-12  PT/INR - ( 12 Sep 2018 23:20 )   PT: 12.4 sec;   INR: 1.13 ratio         PTT - ( 12 Sep 2018 23:20 )  PTT:32.1 sec      RADIOLOGY & ADDITIONAL STUDIES:  CT L-spine 9/12  Abnormal lytic lesion involving the L2 vertebral body of the paraspinal   epidural extension of tumor seen. This likely compatible with underlying   metastasis given patient's history.    Abnormal lesion is seen involving the lungs bilaterally.          PROTEIN CALORIE MALNUTRITION:   [ ] PPSV2 < or = to 30% [ ] significant weight loss  [ ] poor nutritional intake [ ] catabolic state [ ] anasarca     Albumin, Serum: 3.5 g/dL (09-12-18 @ 23:02)  Artificial Nutrition [ ]     REFERRALS:   [ ]Chaplaincy  [ ] Hospice  [ ]Child Life  [ ]Social Work  [ ]Case management [ ]Holistic Therapy     Goals of Care Discussion Document:     Cali Gardner MD  Palliative Medicine  office: [phone]  pager: [phone]

## 2018-09-13 NOTE — PROGRESS NOTE ADULT - ASSESSMENT
Ms. Son is a 72 y/o F with HTN, prediabetes and chronic back pain 2/2 spinal stenosis p/w worsening of chronic back pain.  Pt reports pain in lower back with R sided sciatica since early 2017.  She had imaging in 2017 which revealed lumbar spinal stenosis.  She had been prescribed gabapentin as well as percocet which she only needed sparingly.  Pt recently has developed worsening lower back pain with paresthesia radiating to L thigh.  Pain makes it difficult to walk, and she feels that her L leg is weaker.  Pt has been unable to lay flat, or stand 2/2 the pain and has spent most of her time sitting.  She has been taking percocet without relief.  She has chronic incontinence, but no new symptoms.  She has been constipated which she attributes to taking percocet more frequently.  She has not had foot numbness or saddle anesthesia.  She had imaging done 2 weeks ago including MRI which showed an infiltrative process at L2 vertebral body, lumbar spinal stenosis and RP soft tissue mass.  CT a/p showed signs suspicious of uterine ca with mets to bone and lung.  Pt was referred to an oncologist with appointment scheduled later this week, but due to increasing severity of pain, she was advised to come to the ED by her PCP.       At [facility] patient receieved CT guided biopsy. Tx to [facility] for further work up and management of spinal lesion (12 Sep 2018 19:54)    PROCEDURE:  Adm 9/13 Uterine mass and L2 Lytic lesion,   POD#0    PLAN:  Neuro: NPO for MRI w/anesth-temp sched for 4:30PM today.  If unable to get MRI due to pt size will get CT Meylo.  Pain Control. Abnormal lesion is seen involving the lungs bilaterally-med fU and recomm. FU Spinal lesion Biopsy-done at [facility]. On Ancef for Cellulitis-medicine FU and recomm. Cont Decadron 4Q6h. Bed Rest. Leukocytosis poss from steroids, cellulitis.    Hemo/Onco FU.    Medicine-pt was seen by Dr Navarro at [facility] [phone]).  As d/w Dr Navarro this AM, pt requested alternate male MD.  Hospitalist made aware to see pt for OR clearnace.    Respiratory: Patient instructed to use incentive spirometer [ ] YES [ X] NO              DVT ppx: [X ] SQL [ ] SQH and Venodynes [ ] Left [ ] Right [ X] Bilateral    Discharge Planning:  PT eval postop.      Assessment:  Please Check When Present   []  GCS  E   V  M     Heart Failure: []Acute, [] acute on chronic , []chronic  Heart Failure:  [] Diastolic (HFpEF), [] Systolic (HFrEF), []Combined (HFpEF and HFrEF), [] RHF, [] Pulm HTN, [] Other    [] CANDIE, [] ATN, [] AIN, [] other  [] CKD1, [] CKD2, [] CKD 3, [] CKD 4, [] CKD 5, []ESRD    Encephalopathy: [] Metabolic, [] Hepatic, [] toxic, [] Neurological, [] Other    Abnormal Nurtitional Status: [] malnurtition (see nutrition note), [ ]underweight: BMI < 19, [] morbid obesity: BMI >40, [] Cachexia    [] Sepsis  [] hypovolemic shock,[] cardiogenic shock, [] hemorrhagic shock, [] neuogenic shock  [] Acute Respiratory Failure  []Cerebral edema, [] Brain compression/ herniation,   [] Functional quadriplegia  [] Acute blood loss anemia

## 2018-09-13 NOTE — CONSULT NOTE ADULT - PROBLEM SELECTOR RECOMMENDATION 3
Outpatient CT w/ uterine mass w/ mets to spine and lung   Outpatient Onc f/up Outpatient CT w/ uterine mass w/ mets to spine and lung   S/p CT guided biopsy of paraspinal mass, f/up path   GYN evaluation   Outpatient Onc f/up

## 2018-09-13 NOTE — CONSULT NOTE ADULT - ASSESSMENT
73F HTN, pre DM, chronic back pain secondary spinal stenosis is a/w acute on chronic back pain to [facility] and found to have an infiltrating L2 vertebral body mass as well as endometrial mass, large RP soft tissue mass and lung nodules concerning for metastatic disease, patient is s/p IR guided vertebral body mass biopsy on 9/12/18 and is being evaluated now for neurosurgical resection of the mass    1. Pain secondary infiltrating L2 vertebral body mass  -appreciate neurosurgical care, patient planned for MRI this afternoon and being considered for a resection of the mass  -follow up pathology results  -if not surgical candidate would consider radiation to the lesion     2. Rule out malignancy  -imaging is concerning for sarcoma versus metastatic endometrial cancer  -MRI pelvis with contrast (if no contraindication) to better characterize the RP mass  -GYN consult for endometrial mass biopsy, it is possible there are two processes with sarcoma and endometrial cancer  -send , CEA, CA 19-9 with next blood draw    Please call oncology fellow if any questions or concerns    Waqas Dan  PGY-6, Hematology-Oncology Fellow  [phone] ([place]) [phone] ([facility])

## 2018-09-13 NOTE — CONSULT NOTE ADULT - ASSESSMENT
73F with PMH of HTN, spinal stenosis, chronic back pain here with worsening pain. Pain started in early 2017 with R-sided sciatica, resulting in diagnosis of lumbar spinal stenosis, and has used gabapentin and percocet at home. Pain subsequently worsened, causing difficulty walking and difficulty being supine, and now has parasthesias radiating to her L thigh. Also with constipation. CT A/P showed concern for new metastatic uterine CA to bone and lung, and came to [facility] from [facility] for management of her spinal lesion.

## 2018-09-13 NOTE — CONSULT NOTE ADULT - SUBJECTIVE AND OBJECTIVE BOX
HPI:  73F h/o HTN, prediabetes, and chronic back pain 2/2 spinal stenosis, p/w worsening of chronic back pain. Pt reported pain in lower back with R sided sciatica since early 2017. She had imaging in 2017 which revealed lumbar spinal stenosis. Pt recently developed worsening lower back pain with paresthesia radiating to L thigh. Pt w/ difficulty walking and lay flat d/t pain, she feels like left leg is weaker. Pt has spent most of her time sitting because that makes the pain better. She has been taking percocet without relief.  She had imaging done 2 weeks ago including MRI which showed an infiltrative process at L2 vertebral body, lumbar spinal stenosis and RP soft tissue mass. CT a/p showed signs suspicious of uterine ca with mets to bone and lung.  Pt was admitted to [facility] for increasing severity of pain. At [facility] patient received CT guided biopsy and transferred to [facility] for neurosurgery eval.     PAST MEDICAL & SURGICAL HISTORY:  Lumbar spinal stenosis  Prediabetes  Essential (primary) hypertension  S/P right knee arthroscopy  S/P dilation and curettage: Early 2000&#x27;s for DUB, findings benign.  S/P cholecystectomy    Review of Systems:   CONSTITUTIONAL: No fever, weight loss, or fatigue  EYES: No eye pain, visual disturbances, or discharge  ENMT:  No difficulty hearing, tinnitus, vertigo; No sinus or throat pain  NECK: No pain or stiffness  BREASTS: No pain, masses, or nipple discharge  RESPIRATORY: No cough, wheezing, chills or hemoptysis; No shortness of breath  CARDIOVASCULAR: No chest pain, palpitations, dizziness, or leg swelling  GASTROINTESTINAL: No abdominal or epigastric pain. No nausea, vomiting, or hematemesis; No diarrhea. No melena or hematochezia. +Constipation.   GENITOURINARY: No dysuria, frequency, hematuria, +incontinence  NEUROLOGICAL: No headaches, memory loss. +Numbness of left leg.   SKIN: No itching, burning, rashes, or lesions   LYMPH NODES: No enlarged glands  ENDOCRINE: No heat or cold intolerance; No hair loss  MUSCULOSKELETAL: No joint pain or swelling; +Left leg weakness.   PSYCHIATRIC: No depression, anxiety, mood swings, or difficulty sleeping  HEME/LYMPH: No easy bruising, or bleeding gums  ALLERY AND IMMUNOLOGIC: No hives or eczema    Allergies    No Known Allergies    Intolerances    Social History:   No alcohol   No smoking     FAMILY HISTORY:  Family history of acute myocardial infarction (Mother): mother  Family history of diabetes mellitus (Mother, Sibling)      MEDICATIONS  (STANDING):  Losartan 50mg PO daily   Percocet 5/325 1tab PO q6h prn     Vital Signs Last 24 Hrs  T(C): 36.6 (13 Sep 2018 13:02), Max: 37.1 (12 Sep 2018 19:45)  T(F): 97.8 (13 Sep 2018 13:02), Max: 98.7 (12 Sep 2018 19:45)  HR: 98 (13 Sep 2018 13:02) (80 - 100)  BP: 148/71 (13 Sep 2018 13:02) (124/55 - 165/87)  BP(mean): --  RR: 16 (13 Sep 2018 13:02) (16 - 18)  SpO2: 95% (13 Sep 2018 13:02) (95% - 100%)  CAPILLARY BLOOD GLUCOSE      POCT Blood Glucose.: 161 mg/dL (13 Sep 2018 06:02)  POCT Blood Glucose.: 167 mg/dL (13 Sep 2018 00:07)    I&O's Summary    12 Sep 2018 07:01  -  13 Sep 2018 07:00  --------------------------------------------------------  IN: 0 mL / OUT: 500 mL / NET: -500 mL    13 Sep 2018 07:01  -  13 Sep 2018 13:19  --------------------------------------------------------  IN: 0 mL / OUT: 1150 mL / NET: -1150 mL        PHYSICAL EXAM:  GENERAL: NAD  HEAD:  Atraumatic, Normocephalic  EYES: EOMI, PERRLA, conjunctiva and sclera clear  NECK: Supple, No JVD  CHEST/LUNG: Clear to auscultation bilaterally; No wheeze  HEART: Regular rate and rhythm; S1S2  ABDOMEN: Soft, Nontender, obese; Bowel sounds present  EXTREMITIES:  2+ Peripheral Pulses, No clubbing, cyanosis, +LE edema L>R  PSYCH: AAOx3  NEUROLOGY: LLE 3/5 strength and 5/5 in other extremities   SKIN: Erythema of b/l lower extremities L>R     LABS:                        12.6   10.9  )-----------( 329      ( 12 Sep 2018 23:11 )             38.6     09-12    140  |  100  |  14  ----------------------------<  138<H>  4.2   |  26  |  0.97    Ca    10.3      12 Sep 2018 23:02    TPro  7.3  /  Alb  3.5  /  TBili  0.2  /  DBili  x   /  AST  14  /  ALT  5<L>  /  AlkPhos  76  09-12    PT/INR - ( 12 Sep 2018 23:20 )   PT: 12.4 sec;   INR: 1.13 ratio         PTT - ( 12 Sep 2018 23:20 )  PTT:32.1 sec          RADIOLOGY & ADDITIONAL TESTS:    Imaging Personally Reviewed:  EKG tracing reviewed and interpreted: NSR     Consultant(s) Notes Reviewed:      Care Discussed with Consultants/Other Providers:

## 2018-09-13 NOTE — CONSULT NOTE ADULT - PROBLEM SELECTOR RECOMMENDATION 6
RCRI of 0, 0.4% risk of major cardiac event   EKG normal, METS > 4 and no cardiac symptoms   No further w/up necessary, may proceed to OR as scheduled

## 2018-09-13 NOTE — CONSULT NOTE ADULT - SUBJECTIVE AND OBJECTIVE BOX
HPI:  73F HTN, pre DM, chronic back pain secondary spinal stenosis is a/w acute on chronic back pain to [facility] and found to have an infiltrating L2 vertebral body mass as well as endometrial mass, large RP soft tissue mass and lung nodules concerning for metastatic disease.    Patient states pain radiates down her left leg and has caused weakness, also associated constipation she relates to her opiate use for pain control. Patient also complains of urinary frequency as well as abnormal uterine bleeding over the past few months. Denies fevers/chills, nausea/vomiting, increased work of breathing.     Allergies  No Known Allergies    MEDICATIONS  (STANDING):  ceFAZolin   IVPB 1000 milliGRAM(s) IV Intermittent every 8 hours  dexamethasone  Injectable 4 milliGRAM(s) IV Push every 6 hours  dextrose 5%. 1000 milliLiter(s) (50 mL/Hr) IV Continuous <Continuous>  dextrose 50% Injectable 12.5 Gram(s) IV Push once  dextrose 50% Injectable 25 Gram(s) IV Push once  dextrose 50% Injectable 25 Gram(s) IV Push once  docusate sodium 100 milliGRAM(s) Oral three times a day  enoxaparin Injectable 40 milliGRAM(s) SubCutaneous at bedtime  influenza   Vaccine 0.5 milliLiter(s) IntraMuscular once  insulin lispro (HumaLOG) corrective regimen sliding scale   SubCutaneous every 6 hours  lactulose Syrup 20 Gram(s) Oral every 6 hours  losartan 50 milliGRAM(s) Oral daily  sodium chloride 0.9% with potassium chloride 20 mEq/L 1000 milliLiter(s) (75 mL/Hr) IV Continuous <Continuous>    MEDICATIONS  (PRN):  acetaminophen   Tablet .. 650 milliGRAM(s) Oral every 6 hours PRN Temp greater or equal to 38C (100.4F), Mild Pain (1 - 3)  dextrose 40% Gel 15 Gram(s) Oral once PRN Blood Glucose LESS THAN 70 milliGRAM(s)/deciliter  glucagon  Injectable 1 milliGRAM(s) IntraMuscular once PRN Glucose LESS THAN 70 milligrams/deciliter  HYDROmorphone  Injectable 1 milliGRAM(s) IV Push every 2 hours PRN moderate-severe pain  ondansetron Injectable 4 milliGRAM(s) IV Push every 6 hours PRN Nausea and/or Vomiting  senna 2 Tablet(s) Oral at bedtime PRN Constipation    PAST MEDICAL & SURGICAL HISTORY:  Lumbar spinal stenosis  Prediabetes  Essential (primary) hypertension  S/P right knee arthroscopy  S/P dilation and curettage: Early 2000&#x27;s for DUB, findings benign.  S/P cholecystectomy    FAMILY HISTORY:  Family history of acute myocardial infarction (Mother): mother  Family history of diabetes mellitus (Mother, Sibling)    SOCIAL HISTORY: No EtOH, no tobacco    REVIEW OF SYSTEMS:  10 point ROS otherwise negative than listed in HPI    Height (cm): 170.18 (09-12 @ 19:45)  Weight (kg): 137 (09-12 @ 19:45)  BMI (kg/m2): 47.3 (09-12 @ 19:45)  BSA (m2): 2.41 (09-12 @ 19:45)    T(F): 97.8 (09-13-18 @ 13:02), Max: 98.7 (09-12-18 @ 19:45)  HR: 98 (09-13-18 @ 13:02)  BP: 148/71 (09-13-18 @ 13:02)  RR: 16 (09-13-18 @ 13:02)  SpO2: 95% (09-13-18 @ 13:02)  Wt(kg): --    GENERAL: NAD, well-developed  HEAD:  Atraumatic, Normocephalic  EYES: EOMI, PERRLA, conjunctiva and sclera clear  NECK: Supple, No JVD  CHEST/LUNG: Clear to auscultation bilaterally; No wheeze  HEART: Regular rate and rhythm; No murmurs, rubs, or gallops  ABDOMEN: Soft, Nontender, Nondistended; Bowel sounds present  EXTREMITIES:  2+ Peripheral Pulses, No clubbing, cyanosis, or edema  SKIN: No rashes or lesions                        12.6   10.9  )-----------( 329      ( 12 Sep 2018 23:11 )             38.6       09-12    140  |  100  |  14  ----------------------------<  138<H>  4.2   |  26  |  0.97    Ca    10.3      12 Sep 2018 23:02    TPro  7.3  /  Alb  3.5  /  TBili  0.2  /  DBili  x   /  AST  14  /  ALT  5<L>  /  AlkPhos  76  09-12    < from: CT Lumbar Spine w/ IV Cont (09.12.18 @ 09:52) >  Impression: Degenerative changes as described above.    Abnormal lytic lesion involving the L2 vertebral body of the paraspinal   epidural extension of tumor seen. This likely compatible with underlying   metastasis given patient's history.    Abnormal lesion is seen involving the lungs bilaterally.    < end of copied text >

## 2018-09-13 NOTE — CONSULT NOTE ADULT - ASSESSMENT
73F h/o HTN, prediabetes, and chronic back pain 2/2 spinal stenosis, p/w worsening of chronic back pain, imaging suspicious for uterine ca with mets to spine and lung, transferred here for neurosurgery eval

## 2018-09-13 NOTE — CONSULT NOTE ADULT - PROBLEM SELECTOR RECOMMENDATION 4
- following for pain; consult initially called at [facility], following for continuity of care across campuses for pain management

## 2018-09-13 NOTE — PROGRESS NOTE ADULT - SUBJECTIVE AND OBJECTIVE BOX
SUBJECTIVE: Comfortable, but c/o LBP and LLE radiculopathy.    OVERNIGHT EVENTS: none    Vital Signs Last 24 Hrs  T(C): 36.6 (13 Sep 2018 09:10), Max: 37.1 (12 Sep 2018 19:45)  T(F): 97.8 (13 Sep 2018 09:10), Max: 98.7 (12 Sep 2018 19:45)  HR: 88 (13 Sep 2018 09:10) (80 - 100)  BP: 135/62 (13 Sep 2018 09:10) (124/55 - 165/87)  BP(mean): --  RR: 16 (13 Sep 2018 09:10) (16 - 18)  SpO2: 97% (13 Sep 2018 09:10) (95% - 100%)  IVF: [ ] IVL [ X] NS+K@ 75  DIET: [ ] Regular [X ] CCD-NPO for MRI [ ] Renal [ ] Puree [ ] Dysphagia [ ] Tube Feeds:   PCA: [ ] YES [X ] NO   WILSON: [ ] YES [X ] NO [X ] VOID   BM: [X ] YES-on 9/13    DRAINS: n/a    PHYSICAL EXAM:    Constitutional: No Acute Distress     Neurological: AAOx3, Following Commands, Moving all Extremities     Motor exam:          Upper extremity                         Delt     Bicep     Tricep    HG                                                 R         5/5        5/5        5/5       5/5                                               L          5/5        5/5        5/5       5/5          Lower extremity                        HF         KF        KE       DF         PF                                                  R        5/5        5/5        5/5     5/5         5/5                                               L         3/5       4/5       4/5      5/5          5/5                                                 Sensation: [X] intact to light touch  [] decreased:     Pulmonary: Clear to Auscultation, No rales, No rhonchi, No wheezes     Cardiovascular: S1, S2, Regular rate and rhythm     Gastrointestinal: Soft, Non-tender, Non-distended     Extremities: No calf tenderness     Incision: n/a    LABS:                        12.6   10.9  )-----------( 329      ( 12 Sep 2018 23:11 )             38.6    09-12    140  |  100  |  14  ----------------------------<  138<H>  4.2   |  26  |  0.97    Ca    10.3      12 Sep 2018 23:02    TPro  7.3  /  Alb  3.5  /  TBili  0.2  /  DBili  x   /  AST  14  /  ALT  5<L>  /  AlkPhos  76  09-12  PT/INR - ( 12 Sep 2018 23:20 )   PT: 12.4 sec;   INR: 1.13 ratio    PTT - ( 12 Sep 2018 23:20 )  PTT:32.1 sec    IMAGING: < from: CT Lumbar Spine w/ IV Cont (09.12.18 @ 09:52) >  Loss of the normal lumbar lordosis is seen.    Abnormal lytic lesion is seen involving the central to left portion of   the L2 vertebral body. There is extensive paraspinal extension of this   process seen as well as epidural extension identified which does appear   to cause moderate narrowing of the spinal canal and mass effect on the   thecal sac. MRI can be done to better characterize this finding if   clinically indicated and there are no contraindications.     Disc bulges, disc osteophytes and bilateral hypertrophic facet changes   are seen at multiple levels which are secondary to degenerative changes.   These findings cause mild narrowing of the spinal canal at the T12-L1,   L1-2, L3-4 and L5-S1 levels and moderate narrowing of the spinal canal at   the L2-3 and L4-5 levels.    Impression: Degenerative changes as described above.    Abnormal lytic lesion involving the L2 vertebral body of the paraspinal   epidural extension of tumor seen. This likely compatible with underlying   metastasis given patient's history.    Abnormal lesion is seen involving the lungs bilaterally.    MEDICATIONS  (STANDING):  ceFAZolin   IVPB 1000 milliGRAM(s) IV Intermittent every 8 hours  dexamethasone  Injectable 4 milliGRAM(s) IV Push every 6 hours  dextrose 5%. 1000 milliLiter(s) (50 mL/Hr) IV Continuous <Continuous>  dextrose 50% Injectable 12.5 Gram(s) IV Push once  dextrose 50% Injectable 25 Gram(s) IV Push once  dextrose 50% Injectable 25 Gram(s) IV Push once  docusate sodium 100 milliGRAM(s) Oral three times a day  influenza   Vaccine 0.5 milliLiter(s) IntraMuscular once  insulin lispro (HumaLOG) corrective regimen sliding scale   SubCutaneous every 6 hours  lactulose Syrup 20 Gram(s) Oral every 6 hours  losartan 50 milliGRAM(s) Oral daily  sodium chloride 0.9% with potassium chloride 20 mEq/L 1000 milliLiter(s) (75 mL/Hr) IV Continuous <Continuous>    MEDICATIONS  (PRN):  acetaminophen   Tablet .. 650 milliGRAM(s) Oral every 6 hours PRN Temp greater or equal to 38C (100.4F), Mild Pain (1 - 3)  dextrose 40% Gel 15 Gram(s) Oral once PRN Blood Glucose LESS THAN 70 milliGRAM(s)/deciliter  glucagon  Injectable 1 milliGRAM(s) IntraMuscular once PRN Glucose LESS THAN 70 milligrams/deciliter  HYDROmorphone  Injectable 0.5 milliGRAM(s) IV Push every 3 hours PRN Breakthrough pain  ondansetron Injectable 4 milliGRAM(s) IV Push every 6 hours PRN Nausea and/or Vomiting  oxyCODONE    IR 5 milliGRAM(s) Oral every 4 hours PRN Moderate Pain (4 - 6)  oxyCODONE    IR 10 milliGRAM(s) Oral every 4 hours PRN Severe Pain (7 - 10)  senna 2 Tablet(s) Oral at bedtime PRN Constipation

## 2018-09-13 NOTE — CONSULT NOTE ADULT - PROBLEM SELECTOR RECOMMENDATION 4
Questionable cellulitis of LLE   Continue Ancef for now Cellulitis of LLE   Continue Ancef   Blood cultures NGTD  LE dopplers negative for DVT

## 2018-09-13 NOTE — CONSULT NOTE ADULT - PROBLEM SELECTOR RECOMMENDATION 3
- awaiting MRI today, further surgical plan per neurosurgery  - on oxy 5 mg Q4/10mg Q4 PRN mod/severe pain and dilaudid 0.5mg IV for breakthrough  - used 1 dose of IV dilaudid

## 2018-09-14 DIAGNOSIS — E44.0 MODERATE PROTEIN-CALORIE MALNUTRITION: ICD-10-CM

## 2018-09-14 DIAGNOSIS — Z29.9 ENCOUNTER FOR PROPHYLACTIC MEASURES, UNSPECIFIED: ICD-10-CM

## 2018-09-14 LAB
GLUCOSE BLDC GLUCOMTR-MCNC: 137 MG/DL — HIGH (ref 70–99)
GLUCOSE BLDC GLUCOMTR-MCNC: 140 MG/DL — HIGH (ref 70–99)
GLUCOSE BLDC GLUCOMTR-MCNC: 144 MG/DL — HIGH (ref 70–99)
GLUCOSE BLDC GLUCOMTR-MCNC: 149 MG/DL — HIGH (ref 70–99)

## 2018-09-14 PROCEDURE — 99233 SBSQ HOSP IP/OBS HIGH 50: CPT

## 2018-09-14 RX ORDER — HYDROMORPHONE HYDROCHLORIDE 2 MG/ML
1 INJECTION INTRAMUSCULAR; INTRAVENOUS; SUBCUTANEOUS
Qty: 0 | Refills: 0 | Status: DISCONTINUED | OUTPATIENT
Start: 2018-09-14 | End: 2018-09-18

## 2018-09-14 RX ORDER — HYDROMORPHONE HYDROCHLORIDE 2 MG/ML
4 INJECTION INTRAMUSCULAR; INTRAVENOUS; SUBCUTANEOUS EVERY 4 HOURS
Qty: 0 | Refills: 0 | Status: DISCONTINUED | OUTPATIENT
Start: 2018-09-14 | End: 2018-09-17

## 2018-09-14 RX ORDER — HYDROMORPHONE HYDROCHLORIDE 2 MG/ML
2 INJECTION INTRAMUSCULAR; INTRAVENOUS; SUBCUTANEOUS EVERY 4 HOURS
Qty: 0 | Refills: 0 | Status: DISCONTINUED | OUTPATIENT
Start: 2018-09-14 | End: 2018-09-18

## 2018-09-14 RX ADMIN — HYDROMORPHONE HYDROCHLORIDE 4 MILLIGRAM(S): 2 INJECTION INTRAMUSCULAR; INTRAVENOUS; SUBCUTANEOUS at 15:29

## 2018-09-14 RX ADMIN — HYDROMORPHONE HYDROCHLORIDE 1 MILLIGRAM(S): 2 INJECTION INTRAMUSCULAR; INTRAVENOUS; SUBCUTANEOUS at 07:40

## 2018-09-14 RX ADMIN — Medication 25 MILLIGRAM(S): at 00:15

## 2018-09-14 RX ADMIN — Medication 100 MILLIGRAM(S): at 22:47

## 2018-09-14 RX ADMIN — Medication 4 MILLIGRAM(S): at 23:49

## 2018-09-14 RX ADMIN — HYDROMORPHONE HYDROCHLORIDE 1 MILLIGRAM(S): 2 INJECTION INTRAMUSCULAR; INTRAVENOUS; SUBCUTANEOUS at 07:10

## 2018-09-14 RX ADMIN — Medication 4 MILLIGRAM(S): at 18:10

## 2018-09-14 RX ADMIN — Medication 100 MILLIGRAM(S): at 15:29

## 2018-09-14 RX ADMIN — LACTULOSE 20 GRAM(S): 10 SOLUTION ORAL at 18:10

## 2018-09-14 RX ADMIN — Medication 100 MILLIGRAM(S): at 06:29

## 2018-09-14 RX ADMIN — HYDROMORPHONE HYDROCHLORIDE 1 MILLIGRAM(S): 2 INJECTION INTRAMUSCULAR; INTRAVENOUS; SUBCUTANEOUS at 04:24

## 2018-09-14 RX ADMIN — HYDROMORPHONE HYDROCHLORIDE 4 MILLIGRAM(S): 2 INJECTION INTRAMUSCULAR; INTRAVENOUS; SUBCUTANEOUS at 15:59

## 2018-09-14 RX ADMIN — HYDROMORPHONE HYDROCHLORIDE 2 MILLIGRAM(S): 2 INJECTION INTRAMUSCULAR; INTRAVENOUS; SUBCUTANEOUS at 13:09

## 2018-09-14 RX ADMIN — HYDROMORPHONE HYDROCHLORIDE 4 MILLIGRAM(S): 2 INJECTION INTRAMUSCULAR; INTRAVENOUS; SUBCUTANEOUS at 18:10

## 2018-09-14 RX ADMIN — LOSARTAN POTASSIUM 50 MILLIGRAM(S): 100 TABLET, FILM COATED ORAL at 06:29

## 2018-09-14 RX ADMIN — Medication 4 MILLIGRAM(S): at 00:14

## 2018-09-14 RX ADMIN — Medication 4 MILLIGRAM(S): at 06:29

## 2018-09-14 RX ADMIN — HYDROMORPHONE HYDROCHLORIDE 2 MILLIGRAM(S): 2 INJECTION INTRAMUSCULAR; INTRAVENOUS; SUBCUTANEOUS at 12:39

## 2018-09-14 RX ADMIN — HYDROMORPHONE HYDROCHLORIDE 1 MILLIGRAM(S): 2 INJECTION INTRAMUSCULAR; INTRAVENOUS; SUBCUTANEOUS at 09:26

## 2018-09-14 RX ADMIN — HYDROMORPHONE HYDROCHLORIDE 4 MILLIGRAM(S): 2 INJECTION INTRAMUSCULAR; INTRAVENOUS; SUBCUTANEOUS at 22:48

## 2018-09-14 RX ADMIN — HYDROMORPHONE HYDROCHLORIDE 4 MILLIGRAM(S): 2 INJECTION INTRAMUSCULAR; INTRAVENOUS; SUBCUTANEOUS at 18:40

## 2018-09-14 RX ADMIN — HYDROMORPHONE HYDROCHLORIDE 1 MILLIGRAM(S): 2 INJECTION INTRAMUSCULAR; INTRAVENOUS; SUBCUTANEOUS at 09:56

## 2018-09-14 RX ADMIN — DEXTROSE MONOHYDRATE, SODIUM CHLORIDE, AND POTASSIUM CHLORIDE 75 MILLILITER(S): 50; .745; 4.5 INJECTION, SOLUTION INTRAVENOUS at 06:29

## 2018-09-14 RX ADMIN — HYDROMORPHONE HYDROCHLORIDE 1 MILLIGRAM(S): 2 INJECTION INTRAMUSCULAR; INTRAVENOUS; SUBCUTANEOUS at 03:54

## 2018-09-14 RX ADMIN — ENOXAPARIN SODIUM 40 MILLIGRAM(S): 100 INJECTION SUBCUTANEOUS at 22:48

## 2018-09-14 RX ADMIN — Medication 4 MILLIGRAM(S): at 12:39

## 2018-09-14 RX ADMIN — HYDROMORPHONE HYDROCHLORIDE 4 MILLIGRAM(S): 2 INJECTION INTRAMUSCULAR; INTRAVENOUS; SUBCUTANEOUS at 23:18

## 2018-09-14 NOTE — PROGRESS NOTE ADULT - PROBLEM SELECTOR PLAN 1
L2 lytic lesion on L/S MRI  Pain management  Pre-op for Monday L2 lytic lesion on L/S MRI  Pain management  PT consult  OOB with brace  Pre-op for Monday

## 2018-09-14 NOTE — PROGRESS NOTE ADULT - ASSESSMENT
73F with PMH of HTN, spinal stenosis, chronic back pain here with worsening pain. Pain started in early 2017 with R-sided sciatica, resulting in diagnosis of lumbar spinal stenosis, and has used gabapentin and percocet at home. Pain subsequently worsened, causing difficulty walking and difficulty being supine, and now has parasthesias radiating to her L thigh. Also with constipation. CT A/P showed concern for new metastatic uterine CA to bone and lung, and came to [facility] from [facility] for management of her spinal lesion.

## 2018-09-14 NOTE — PROGRESS NOTE ADULT - PROBLEM SELECTOR PLAN 3
- states dilaudid 2mg/4mg PO with 1mg IV for breakthrough is working currently  - continue to monitor  - bowel regimen

## 2018-09-14 NOTE — PROGRESS NOTE ADULT - PROBLEM SELECTOR PLAN 4
Cellulitis of LLE diagnosed at [facility]  Continue Ancef for 7 days total   Blood cultures NGTD  LE dopplers negative for DVT.

## 2018-09-14 NOTE — PROGRESS NOTE ADULT - PROBLEM SELECTOR PLAN 1
-likely metastatic bone lesion of L2 causing severe lower back pain   -f/up MRI L-spine   -pain control w/ Dilaudid IV/PO prn   -Decadron as per neurosurgery   -possible OR pending MRI results.

## 2018-09-14 NOTE — PROGRESS NOTE ADULT - ASSESSMENT
73F h/o HTN, prediabetes, and chronic back pain 2/2 spinal stenosis, p/w worsening of chronic back pain, imaging suspicious for uterine ca with mets to spine and lung, transferred here for neurosurgery eval for L2 lytic lesion

## 2018-09-14 NOTE — PROGRESS NOTE ADULT - PROBLEM SELECTOR PLAN 3
Outpatient CT suggest uterine mass w/ mets to spine and lung   S/p CT guided biopsy of paraspinal mass at L2, f/up path   GYN evaluation   Onc input appreciated, radiation if not resectable

## 2018-09-14 NOTE — PROGRESS NOTE ADULT - SUBJECTIVE AND OBJECTIVE BOX
HPI: 73F with PMH of HTN, spinal stenosis, chronic back pain here with worsening pain. Pain started in early 2017 with R-sided sciatica, resulting in diagnosis of lumbar spinal stenosis, and has used gabapentin and percocet at home. Pain subsequently worsened, causing difficulty walking and difficulty being supine, and now has parasthesias radiating to her L thigh. Also with constipation. CT A/P showed concern for new metastatic uterine CA to bone and lung, and came to [facility] from [facility] for management of her spinal lesion.     ADVANCE DIRECTIVES:    DNR [ ]  MOLST  [ ]    Living Will  [ ]   DECISION MAKER(s):  [ ] Health Care Proxy(s)  [ ] Surrogate(s)  [ ] Guardian           Name(s) and Contact(s):    BASELINE (I)ADL(s) (prior to admission):  Parker: [ ]Total  [ ] Moderate [ ]Dependent    Allergies    No Known Allergies    Intolerances    MEDICATIONS  (STANDING):  ceFAZolin   IVPB 1000 milliGRAM(s) IV Intermittent every 8 hours  dexamethasone  Injectable 4 milliGRAM(s) IV Push every 6 hours  dextrose 5%. 1000 milliLiter(s) (50 mL/Hr) IV Continuous <Continuous>  dextrose 50% Injectable 12.5 Gram(s) IV Push once  dextrose 50% Injectable 25 Gram(s) IV Push once  dextrose 50% Injectable 25 Gram(s) IV Push once  docusate sodium 100 milliGRAM(s) Oral three times a day  enoxaparin Injectable 40 milliGRAM(s) SubCutaneous at bedtime  influenza   Vaccine 0.5 milliLiter(s) IntraMuscular once  insulin lispro (HumaLOG) corrective regimen sliding scale   SubCutaneous every 6 hours  lactulose Syrup 20 Gram(s) Oral every 6 hours  losartan 50 milliGRAM(s) Oral daily  sodium chloride 0.9% with potassium chloride 20 mEq/L 1000 milliLiter(s) (75 mL/Hr) IV Continuous <Continuous>    MEDICATIONS  (PRN):  acetaminophen   Tablet .. 650 milliGRAM(s) Oral every 6 hours PRN Temp greater or equal to 38C (100.4F), Mild Pain (1 - 3)  dextrose 40% Gel 15 Gram(s) Oral once PRN Blood Glucose LESS THAN 70 milliGRAM(s)/deciliter  glucagon  Injectable 1 milliGRAM(s) IntraMuscular once PRN Glucose LESS THAN 70 milligrams/deciliter  HYDROmorphone  Injectable 0.5 milliGRAM(s) IV Push every 3 hours PRN Breakthrough pain  ondansetron Injectable 4 milliGRAM(s) IV Push every 6 hours PRN Nausea and/or Vomiting  oxyCODONE    IR 5 milliGRAM(s) Oral every 4 hours PRN Moderate Pain (4 - 6)  oxyCODONE    IR 10 milliGRAM(s) Oral every 4 hours PRN Severe Pain (7 - 10)  senna 2 Tablet(s) Oral at bedtime PRN Constipation    PRESENT SYMPTOMS:   Source if other than patient:  [ ]Family   [ ]Team     Pain (Impact on QOL):    Location -         Minimal acceptable level (0-10 scale):                    Aggravating factors -  Quality -  Radiation -  Severity (0-10 scale) -    Timing -    PAIN AD Score:     [email or web address] (press ctrl +  left click to view)    Dyspnea:                           [ ]Mild [ ]Moderate [ ]Severe  Anxiety:                             [ ]Mild [ ]Moderate [ ]Severe  Fatigue:                             [ ]Mild [ ]Moderate [ ]Severe  Nausea:                             [ ]Mild [ ]Moderate [ ]Severe  Loss of appetite:              [ ]Mild [ ]Moderate [ ]Severe  Constipation:                    [ ]Mild [ ]Moderate [ ]Severe    Other Symptoms:  [ ]All other review of systems negative   [ ]Unable to obtain due to poor mentation     Karnofsky Performance Score/Palliative Performance Status Version 2:         %    PHYSICAL EXAM:  Vital Signs Last 24 Hrs  T(C): 36.6 (13 Sep 2018 09:10), Max: 37.1 (12 Sep 2018 19:45)  T(F): 97.8 (13 Sep 2018 09:10), Max: 98.7 (12 Sep 2018 19:45)  HR: 88 (13 Sep 2018 09:10) (80 - 100)  BP: 135/62 (13 Sep 2018 09:10) (124/55 - 165/87)  BP(mean): --  RR: 16 (13 Sep 2018 09:10) (16 - 18)  SpO2: 97% (13 Sep 2018 09:10) (95% - 100%) I&O's Summary    12 Sep 2018 07:01  -  13 Sep 2018 07:00  --------------------------------------------------------  IN: 0 mL / OUT: 500 mL / NET: -500 mL    13 Sep 2018 07:01  -  13 Sep 2018 13:06  --------------------------------------------------------  IN: 0 mL / OUT: 1150 mL / NET: -1150 mL        GENERAL:  [ X]Alert  [ ]Oriented x   [ ]Lethargic  [ ]Cachexia  [ ]Unarousable  [X ]Verbal  [ ]Non-Verbal    Behavioral:   [ X] Normal [ ] Delirium [ ] Agitation [ ] Other    HEENT:  [X ]Normal   [ ]Dry mouth   [ ]ET Tube/Trach  [ ]Oral lesions    PULMONARY:   [ ]Clear [ ]Tachypnea  [ ]Audible excessive secretions   [ ]Rhonchi        [ ]Right [ ]Left [ ]Bilateral  [ ]Crackles        [ ]Right [ ]Left [ ]Bilateral  [ ]Wheezing     [ ]Right [ ]Left [ ]Bilateral    CARDIOVASCULAR:    [ ]Regular [ ]Irregular [ ]Tachy  [ ]Spencer [ ]Murmur [ ]Other    GASTROINTESTINAL:  [ ]Soft  [ ]Distended   [ ]+BS  [ ]Non tender [ ]Tender  [ ]PEG [ ]OGT/ NGT  Last BM:     GENITOURINARY:  [ ]Normal [ ] Incontinent   [ ]Oliguria/Anuria   [ ]Martinez    MUSCULOSKELETAL:   [ ]Normal   [ ]Weakness  [ ]Bed/Wheelchair bound [X ]Edema    NEUROLOGIC:   [ X]No focal deficits  [ ] Cognitive impairment  [ ] Dysphagia [ ]Dysarthria [ ] Paresis [ ]Other     SKIN:   [X ]Normal   [ ]Pressure ulcer(s)  [ ]Rash    CRITICAL CARE:  [ ] Shock Present  [ ]Septic [ ]Cardiogenic [ ]Neurologic [ ]Hypovolemic  [ ]  Vasopressors [ ]  Inotropes   [ ] Respiratory failure present  [ ] Acute  [ ] Chronic [ ] Hypoxic  [ ] Hypercarbic [ ] Other  [ ] Other organ failure     LABS: reviewed                        12.6   10.9  )-----------( 329      ( 12 Sep 2018 23:11 )             38.6   09-12    140  |  100  |  14  ----------------------------<  138<H>  4.2   |  26  |  0.97    Ca    10.3      12 Sep 2018 23:02    TPro  7.3  /  Alb  3.5  /  TBili  0.2  /  DBili  x   /  AST  14  /  ALT  5<L>  /  AlkPhos  76  09-12  PT/INR - ( 12 Sep 2018 23:20 )   PT: 12.4 sec;   INR: 1.13 ratio         PTT - ( 12 Sep 2018 23:20 )  PTT:32.1 sec      RADIOLOGY & ADDITIONAL STUDIES:  CT L-spine 9/12  Abnormal lytic lesion involving the L2 vertebral body of the paraspinal   epidural extension of tumor seen. This likely compatible with underlying   metastasis given patient's history.    Abnormal lesion is seen involving the lungs bilaterally.          PROTEIN CALORIE MALNUTRITION:   [ ] PPSV2 < or = to 30% [ ] significant weight loss  [ ] poor nutritional intake [ ] catabolic state [ ] anasarca     Albumin, Serum: 3.5 g/dL (09-12-18 @ 23:02)  Artificial Nutrition [ ]     REFERRALS:   [ ]Chaplaincy  [ ] Hospice  [ ]Child Life  [ ]Social Work  [ ]Case management [ ]Holistic Therapy     Goals of Care Discussion Document:     Cali Gardner MD  Palliative Medicine  office: [phone]  pager: [phone]

## 2018-09-14 NOTE — PROGRESS NOTE ADULT - SUBJECTIVE AND OBJECTIVE BOX
Patient is a 73y old  Female who presents with a chief complaint of L2 lesion (13 Sep 2018 15:54)      SUBJECTIVE / OVERNIGHT EVENTS: Pt with severe back pain last night after return from MRI, now better.     MEDICATIONS  (STANDING):  ceFAZolin   IVPB 1000 milliGRAM(s) IV Intermittent every 8 hours  dexamethasone  Injectable 4 milliGRAM(s) IV Push every 6 hours  dextrose 5%. 1000 milliLiter(s) (50 mL/Hr) IV Continuous <Continuous>  dextrose 50% Injectable 12.5 Gram(s) IV Push once  dextrose 50% Injectable 25 Gram(s) IV Push once  dextrose 50% Injectable 25 Gram(s) IV Push once  docusate sodium 100 milliGRAM(s) Oral three times a day  enoxaparin Injectable 40 milliGRAM(s) SubCutaneous at bedtime  HYDROmorphone   Tablet 4 milliGRAM(s) Oral every 4 hours  influenza   Vaccine 0.5 milliLiter(s) IntraMuscular once  insulin lispro (HumaLOG) corrective regimen sliding scale   SubCutaneous three times a day before meals  insulin lispro (HumaLOG) corrective regimen sliding scale   SubCutaneous at bedtime  lactulose Syrup 20 Gram(s) Oral every 6 hours  losartan 50 milliGRAM(s) Oral daily    MEDICATIONS  (PRN):  acetaminophen   Tablet .. 650 milliGRAM(s) Oral every 6 hours PRN Temp greater or equal to 38C (100.4F), Mild Pain (1 - 3)  dextrose 40% Gel 15 Gram(s) Oral once PRN Blood Glucose LESS THAN 70 milliGRAM(s)/deciliter  glucagon  Injectable 1 milliGRAM(s) IntraMuscular once PRN Glucose LESS THAN 70 milligrams/deciliter  HYDROmorphone   Tablet 2 milliGRAM(s) Oral every 4 hours PRN Moderate Pain (4 - 6)  HYDROmorphone  Injectable 1 milliGRAM(s) IV Push every 3 hours PRN breakthru pain  ondansetron Injectable 4 milliGRAM(s) IV Push every 6 hours PRN Nausea and/or Vomiting  senna 2 Tablet(s) Oral at bedtime PRN Constipation      Vital Signs Last 24 Hrs  T(C): 36.9 (14 Sep 2018 09:18), Max: 37 (14 Sep 2018 00:32)  T(F): 98.4 (14 Sep 2018 09:18), Max: 98.6 (14 Sep 2018 00:32)  HR: 82 (14 Sep 2018 09:18) (71 - 98)  BP: 126/70 (14 Sep 2018 09:18) (126/70 - 152/83)  BP(mean): --  RR: 18 (14 Sep 2018 09:18) (16 - 20)  SpO2: 95% (14 Sep 2018 09:18) (94% - 97%)  CAPILLARY BLOOD GLUCOSE      POCT Blood Glucose.: 137 mg/dL (14 Sep 2018 07:42)  POCT Blood Glucose.: 145 mg/dL (13 Sep 2018 22:15)  POCT Blood Glucose.: 129 mg/dL (13 Sep 2018 15:20)    I&O's Summary    13 Sep 2018 07:01  -  14 Sep 2018 07:00  --------------------------------------------------------  IN: 1510 mL / OUT: 2100 mL / NET: -590 mL    14 Sep 2018 07:01  -  14 Sep 2018 10:58  --------------------------------------------------------  IN: 0 mL / OUT: 200 mL / NET: -200 mL        PHYSICAL EXAM:  GENERAL: NAD  HEAD:  Atraumatic, Normocephalic  EYES: EOMI, PERRLA, conjunctiva and sclera clear  NECK: Supple, No JVD  CHEST/LUNG: Clear to auscultation bilaterally; No wheeze  HEART: Regular rate and rhythm; S1S2  ABDOMEN: Soft, Nontender, obese; Bowel sounds present  EXTREMITIES:  2+ Peripheral Pulses, No clubbing, cyanosis, +LE edema L>R  PSYCH: AAOx3  NEUROLOGY: CN's intact, LLE 3/5 strength and 5/5 in other extremities   SKIN: Erythema of b/l lower extremities L>R       LABS:                        12.6   10.9  )-----------( 329      ( 12 Sep 2018 23:11 )             38.6     09-12    140  |  100  |  14  ----------------------------<  138<H>  4.2   |  26  |  0.97    Ca    10.3      12 Sep 2018 23:02    TPro  7.3  /  Alb  3.5  /  TBili  0.2  /  DBili  x   /  AST  14  /  ALT  5<L>  /  AlkPhos  76  09-12    PT/INR - ( 12 Sep 2018 23:20 )   PT: 12.4 sec;   INR: 1.13 ratio         PTT - ( 12 Sep 2018 23:20 )  PTT:32.1 sec          RADIOLOGY & ADDITIONAL TESTS:    Imaging Personally Reviewed:    Consultant(s) Notes Reviewed:      Care Discussed with Consultants/Other Providers: spoke w/ Amanda (neurosurgery PA), GYN consult for uterine mass

## 2018-09-14 NOTE — PROGRESS NOTE ADULT - PROBLEM SELECTOR PLAN 6
RCRI of 0, 0.4% risk of major cardiac event   EKG normal, METS > 4 and no cardiac symptoms   No further w/up necessary, may proceed to OR as scheduled.

## 2018-09-14 NOTE — PROGRESS NOTE ADULT - SUBJECTIVE AND OBJECTIVE BOX
Ms. Son is a 74 y/o F with HTN, prediabetes and chronic back pain 2/2 spinal stenosis p/w worsening of chronic back pain.  Pt reports pain in lower back with R sided sciatica since early 2017.  She had imaging in 2017 which revealed lumbar spinal stenosis.  She had been prescribed gabapentin as well as percocet which she only needed sparingly.  Pt recently has developed worsening lower back pain with paresthesia radiating to L thigh.  Pain makes it difficult to walk, and she feels that her L leg is weaker.  Pt has been unable to lay flat, or stand 2/2 the pain and has spent most of her time sitting.  She has been taking percocet without relief.  She has chronic incontinence, but no new symptoms.  She has been constipated which she attributes to taking percocet more frequently.  She has not had foot numbness or saddle anesthesia.  She had imaging done 2 weeks ago including MRI which showed an infiltrative process at L2 vertebral body, lumbar spinal stenosis and RP soft tissue mass.  CT a/p showed signs suspicious of uterine ca with mets to bone and lung.  Pt was referred to an oncologist with appointment scheduled later this week, but due to increasing severity of pain, she was advised to come to the ED by her PCP.       At [facility] patient received CT guided biopsy. Tx to [facility] for further work up and management of spinal lesion (12 Sep 2018 19:54)    OVERNIGHT EVENTS: None, would like to be oob with brace    Vital Signs Last 24 Hrs  T(C): 36.9 (14 Sep 2018 09:18), Max: 37 (14 Sep 2018 00:32)  T(F): 98.4 (14 Sep 2018 09:18), Max: 98.6 (14 Sep 2018 00:32)  HR: 82 (14 Sep 2018 09:18) (71 - 98)  BP: 126/70 (14 Sep 2018 09:18) (126/70 - 152/83)  BP(mean): --  RR: 18 (14 Sep 2018 09:18) (16 - 20)  SpO2: 95% (14 Sep 2018 09:18) (94% - 97%)    I&O's Detail    13 Sep 2018 07:01  -  14 Sep 2018 07:00  --------------------------------------------------------  IN:    IV PiggyBack: 100 mL    Oral Fluid: 480 mL    sodium chloride 0.9% with potassium chloride 20 mEq/L: 930 mL  Total IN: 1510 mL    OUT:    Voided: 2100 mL  Total OUT: 2100 mL    Total NET: -590 mL      14 Sep 2018 07:01  -  14 Sep 2018 10:46  --------------------------------------------------------  IN:  Total IN: 0 mL    OUT:    Voided: 200 mL  Total OUT: 200 mL    Total NET: -200 mL        I&O's Summary    13 Sep 2018 07:01  -  14 Sep 2018 07:00  --------------------------------------------------------  IN: 1510 mL / OUT: 2100 mL / NET: -590 mL    14 Sep 2018 07:01  -  14 Sep 2018 10:46  --------------------------------------------------------  IN: 0 mL / OUT: 200 mL / NET: -200 mL        PHYSICAL EXAM:  Neurological: Alert awake oriented x3    Motor exam:         [x] Upper extremity                 D             B          T          WE       WF      HI                                               R         5/5        5/5        5/5       5/5     5/5       5/5                                               L          5/5        5/5        5/5       5/5     5/5       5/5         [x] Lower extremity                Ps          Ha        Quad    EHL        FHL                                               R        5/5        5/5        5/5       5/5         5/5                                               L         5/5        5/5       3/5       5/5          5/5                                                             Sensation: [x] intact to light touch  [] decreased:     Gait    Cardiovascular: Regular  Respiratory:  Clear bilaterally  Gastrointestinal: Soft + BS non tender  Genitourinary:  Extremities:  Incision/Wound:    TUBES/LINES:  [] CVC  [] A-line  [] Lumbar Drain  [] Ventriculostomy  [] Other    DIET: Consistent carbohydrate Diet  [] NPO  [] Mechanical  [] Tube feeds    LABS:                        12.6   10.9  )-----------( 329      ( 12 Sep 2018 23:11 )             38.6     09-12    140  |  100  |  14  ----------------------------<  138<H>  4.2   |  26  |  0.97    Ca    10.3      12 Sep 2018 23:02    TPro  7.3  /  Alb  3.5  /  TBili  0.2  /  DBili  x   /  AST  14  /  ALT  5<L>  /  AlkPhos  76  09-12    PT/INR - ( 12 Sep 2018 23:20 )   PT: 12.4 sec;   INR: 1.13 ratio         PTT - ( 12 Sep 2018 23:20 )  PTT:32.1 sec        CAPILLARY BLOOD GLUCOSE      POCT Blood Glucose.: 137 mg/dL (14 Sep 2018 07:42)  POCT Blood Glucose.: 145 mg/dL (13 Sep 2018 22:15)  POCT Blood Glucose.: 129 mg/dL (13 Sep 2018 15:20)          Drug Levels: [] N/A    CSF Analysis: [] N/A      Allergies    No Known Allergies    Intolerances      MEDICATIONS:  Antibiotics:  ceFAZolin   IVPB 1000 milliGRAM(s) IV Intermittent every 8 hours    Neuro:  acetaminophen   Tablet .. 650 milliGRAM(s) Oral every 6 hours PRN  HYDROmorphone   Tablet 2 milliGRAM(s) Oral every 4 hours PRN  HYDROmorphone   Tablet 4 milliGRAM(s) Oral every 4 hours  HYDROmorphone  Injectable 1 milliGRAM(s) IV Push every 3 hours PRN  ondansetron Injectable 4 milliGRAM(s) IV Push every 6 hours PRN    Anticoagulation:  enoxaparin Injectable 40 milliGRAM(s) SubCutaneous at bedtime    OTHER:  dexamethasone  Injectable 4 milliGRAM(s) IV Push every 6 hours  dextrose 40% Gel 15 Gram(s) Oral once PRN  dextrose 50% Injectable 12.5 Gram(s) IV Push once  dextrose 50% Injectable 25 Gram(s) IV Push once  dextrose 50% Injectable 25 Gram(s) IV Push once  docusate sodium 100 milliGRAM(s) Oral three times a day  glucagon  Injectable 1 milliGRAM(s) IntraMuscular once PRN  influenza   Vaccine 0.5 milliLiter(s) IntraMuscular once  insulin lispro (HumaLOG) corrective regimen sliding scale   SubCutaneous three times a day before meals  insulin lispro (HumaLOG) corrective regimen sliding scale   SubCutaneous at bedtime  lactulose Syrup 20 Gram(s) Oral every 6 hours  losartan 50 milliGRAM(s) Oral daily  senna 2 Tablet(s) Oral at bedtime PRN    IVF:  dextrose 5%. 1000 milliLiter(s) IV Continuous <Continuous>    CULTURES:    RADIOLOGY & ADDITIONAL TESTS:      ASSESSMENT:     Ms. Son is a 74 y/o F with HTN, prediabetes and chronic back pain 2/2 spinal stenosis p/w worsening of chronic back pain.  Pt reports pain in lower back with R sided sciatica since early 2017.  She had imaging in 2017 which revealed lumbar spinal stenosis.  She had been prescribed gabapentin as well as percocet which she only needed sparingly.  Pt recently has developed worsening lower back pain with paresthesia radiating to L thigh.  Pain makes it difficult to walk, and she feels that her L leg is weaker.  Pt has been unable to lay flat, or stand 2/2 the pain and has spent most of her time sitting.  She has been taking percocet without relief.  She has chronic incontinence, but no new symptoms.  She has been constipated which she attributes to taking percocet more frequently.  She has not had foot numbness or saddle anesthesia.  She had imaging done 2 weeks ago including MRI which showed an infiltrative process at L2 vertebral body, lumbar spinal stenosis and RP soft tissue mass.  CT a/p showed signs suspicious of uterine ca with mets to bone and lung.  Pt was referred to an oncologist with appointment scheduled later this week, but due to increasing severity of pain, she was advised to come to the ED by her PCP.       At [facility] patient receieved CT guided biopsy. Tx to [facility] for further work up and management of spinal lesion (12 Sep 2018 19:54)    Assessment:  Please Check When Present   [x]  GCS  E4   V5  M6     Heart Failure: []Acute, [] acute on chronic , []chronic  Heart Failure:  [] Diastolic (HFpEF), [] Systolic (HFrEF), []Combined (HFpEF and HFrEF), [] RHF, [] Pulm HTN, [] Other    [] CANDIE, [] ATN, [] AIN, [] other  [] CKD1, [] CKD2, [] CKD 3, [] CKD 4, [] CKD 5, []ESRD    Encephalopathy: [] Metabolic, [] Hepatic, [] toxic, [] Neurological, [] Other    Abnormal Nurtitional Status: [] malnurtition (see nutrition note), [ ]underweight: BMI < 19, [x] morbid obesity: BMI >40, [] Cachexia    [] Sepsis  [] hypovolemic shock,[] cardiogenic shock, [] hemorrhagic shock, [] neuogenic shock  [] Acute Respiratory Failure  []Cerebral edema, [] Brain compression/ herniation,   [] Functional quadriplegia  [] Acute blood loss anemia Ms. Son is a 74 y/o F with HTN, prediabetes and chronic back pain 2/2 spinal stenosis p/w worsening of chronic back pain.  Pt reports pain in lower back with R sided sciatica since early 2017.  She had imaging in 2017 which revealed lumbar spinal stenosis.  She had been prescribed gabapentin as well as percocet which she only needed sparingly.  Pt recently has developed worsening lower back pain with paresthesia radiating to L thigh.  Pain makes it difficult to walk, and she feels that her L leg is weaker.  Pt has been unable to lay flat, or stand 2/2 the pain and has spent most of her time sitting.  She has been taking percocet without relief.  She has chronic incontinence, but no new symptoms.  She has been constipated which she attributes to taking percocet more frequently.  She has not had foot numbness or saddle anesthesia.  She had imaging done 2 weeks ago including MRI which showed an infiltrative process at L2 vertebral body, lumbar spinal stenosis and RP soft tissue mass.  CT a/p showed signs suspicious of uterine ca with mets to bone and lung.  Pt was referred to an oncologist with appointment scheduled later this week, but due to increasing severity of pain, she was advised to come to the ED by her PCP.       At [facility] patient received CT guided biopsy. Tx to [facility] for further work up and management of spinal lesion (12 Sep 2018 19:54)    OVERNIGHT EVENTS: None, would like to be oob with brace    Vital Signs Last 24 Hrs  T(C): 36.9 (14 Sep 2018 09:18), Max: 37 (14 Sep 2018 00:32)  T(F): 98.4 (14 Sep 2018 09:18), Max: 98.6 (14 Sep 2018 00:32)  HR: 82 (14 Sep 2018 09:18) (71 - 98)  BP: 126/70 (14 Sep 2018 09:18) (126/70 - 152/83)  BP(mean): --  RR: 18 (14 Sep 2018 09:18) (16 - 20)  SpO2: 95% (14 Sep 2018 09:18) (94% - 97%)    I&O's Detail    13 Sep 2018 07:01  -  14 Sep 2018 07:00  --------------------------------------------------------  IN:    IV PiggyBack: 100 mL    Oral Fluid: 480 mL    sodium chloride 0.9% with potassium chloride 20 mEq/L: 930 mL  Total IN: 1510 mL    OUT:    Voided: 2100 mL  Total OUT: 2100 mL    Total NET: -590 mL      14 Sep 2018 07:01  -  14 Sep 2018 10:46  --------------------------------------------------------  IN:  Total IN: 0 mL    OUT:    Voided: 200 mL  Total OUT: 200 mL    Total NET: -200 mL        I&O's Summary    13 Sep 2018 07:01  -  14 Sep 2018 07:00  --------------------------------------------------------  IN: 1510 mL / OUT: 2100 mL / NET: -590 mL    14 Sep 2018 07:01  -  14 Sep 2018 10:46  --------------------------------------------------------  IN: 0 mL / OUT: 200 mL / NET: -200 mL        PHYSICAL EXAM:  Neurological: Alert awake oriented x3    Motor exam:         [x] Upper extremity                 D             B          T          WE       WF      HI                                               R         5/5        5/5        5/5       5/5     5/5       5/5                                               L          5/5        5/5        5/5       5/5     5/5       5/5         [x] Lower extremity                Ps          Ha        Quad    EHL        FHL                                               R        5/5        5/5        5/5       5/5         5/5                                               L         5/5        5/5       3/5       5/5          5/5                                                             Sensation: [x] intact to light touch  [] decreased:     Gait    Cardiovascular: Regular  Respiratory:  Clear bilaterally  Gastrointestinal: Soft + BS non tender  Genitourinary:  Extremities: + cellulitis of lower extremities, + edema of LLE  Incision/Wound:    TUBES/LINES:  [] CVC  [] A-line  [] Lumbar Drain  [] Ventriculostomy  [] Other    DIET: Consistent carbohydrate Diet  [] NPO  [] Mechanical  [] Tube feeds    LABS:                        12.6   10.9  )-----------( 329      ( 12 Sep 2018 23:11 )             38.6     09-12    140  |  100  |  14  ----------------------------<  138<H>  4.2   |  26  |  0.97    Ca    10.3      12 Sep 2018 23:02    TPro  7.3  /  Alb  3.5  /  TBili  0.2  /  DBili  x   /  AST  14  /  ALT  5<L>  /  AlkPhos  76  09-12    PT/INR - ( 12 Sep 2018 23:20 )   PT: 12.4 sec;   INR: 1.13 ratio         PTT - ( 12 Sep 2018 23:20 )  PTT:32.1 sec        CAPILLARY BLOOD GLUCOSE      POCT Blood Glucose.: 137 mg/dL (14 Sep 2018 07:42)  POCT Blood Glucose.: 145 mg/dL (13 Sep 2018 22:15)  POCT Blood Glucose.: 129 mg/dL (13 Sep 2018 15:20)          Drug Levels: [] N/A    CSF Analysis: [] N/A      Allergies    No Known Allergies    Intolerances      MEDICATIONS:  Antibiotics:  ceFAZolin   IVPB 1000 milliGRAM(s) IV Intermittent every 8 hours    Neuro:  acetaminophen   Tablet .. 650 milliGRAM(s) Oral every 6 hours PRN  HYDROmorphone   Tablet 2 milliGRAM(s) Oral every 4 hours PRN  HYDROmorphone   Tablet 4 milliGRAM(s) Oral every 4 hours  HYDROmorphone  Injectable 1 milliGRAM(s) IV Push every 3 hours PRN  ondansetron Injectable 4 milliGRAM(s) IV Push every 6 hours PRN    Anticoagulation:  enoxaparin Injectable 40 milliGRAM(s) SubCutaneous at bedtime    OTHER:  dexamethasone  Injectable 4 milliGRAM(s) IV Push every 6 hours  dextrose 40% Gel 15 Gram(s) Oral once PRN  dextrose 50% Injectable 12.5 Gram(s) IV Push once  dextrose 50% Injectable 25 Gram(s) IV Push once  dextrose 50% Injectable 25 Gram(s) IV Push once  docusate sodium 100 milliGRAM(s) Oral three times a day  glucagon  Injectable 1 milliGRAM(s) IntraMuscular once PRN  influenza   Vaccine 0.5 milliLiter(s) IntraMuscular once  insulin lispro (HumaLOG) corrective regimen sliding scale   SubCutaneous three times a day before meals  insulin lispro (HumaLOG) corrective regimen sliding scale   SubCutaneous at bedtime  lactulose Syrup 20 Gram(s) Oral every 6 hours  losartan 50 milliGRAM(s) Oral daily  senna 2 Tablet(s) Oral at bedtime PRN    IVF:  dextrose 5%. 1000 milliLiter(s) IV Continuous <Continuous>    CULTURES:    RADIOLOGY & ADDITIONAL TESTS:      ASSESSMENT:     Ms. Son is a 74 y/o F with HTN, prediabetes and chronic back pain 2/2 spinal stenosis p/w worsening of chronic back pain.  Pt reports pain in lower back with R sided sciatica since early 2017.  She had imaging in 2017 which revealed lumbar spinal stenosis.  She had been prescribed gabapentin as well as percocet which she only needed sparingly.  Pt recently has developed worsening lower back pain with paresthesia radiating to L thigh.  Pain makes it difficult to walk, and she feels that her L leg is weaker.  Pt has been unable to lay flat, or stand 2/2 the pain and has spent most of her time sitting.  She has been taking percocet without relief.  She has chronic incontinence, but no new symptoms.  She has been constipated which she attributes to taking percocet more frequently.  She has not had foot numbness or saddle anesthesia.  She had imaging done 2 weeks ago including MRI which showed an infiltrative process at L2 vertebral body, lumbar spinal stenosis and RP soft tissue mass.  CT a/p showed signs suspicious of uterine ca with mets to bone and lung.  Pt was referred to an oncologist with appointment scheduled later this week, but due to increasing severity of pain, she was advised to come to the ED by her PCP.       At [facility] patient receieved CT guided biopsy. Tx to [facility] for further work up and management of spinal lesion (12 Sep 2018 19:54)    Assessment:  Please Check When Present   [x]  GCS  E4   V5  M6     Heart Failure: []Acute, [] acute on chronic , []chronic  Heart Failure:  [] Diastolic (HFpEF), [] Systolic (HFrEF), []Combined (HFpEF and HFrEF), [] RHF, [] Pulm HTN, [] Other    [] CANDIE, [] ATN, [] AIN, [] other  [] CKD1, [] CKD2, [] CKD 3, [] CKD 4, [] CKD 5, []ESRD    Encephalopathy: [] Metabolic, [] Hepatic, [] toxic, [] Neurological, [] Other    Abnormal Nurtitional Status: [] malnurtition (see nutrition note), [ ]underweight: BMI < 19, [x] morbid obesity: BMI >40, [] Cachexia    [] Sepsis  [] hypovolemic shock,[] cardiogenic shock, [] hemorrhagic shock, [] neuogenic shock  [] Acute Respiratory Failure  []Cerebral edema, [] Brain compression/ herniation,   [] Functional quadriplegia  [] Acute blood loss anemia

## 2018-09-15 LAB
ANION GAP SERPL CALC-SCNC: 10 MMOL/L — SIGNIFICANT CHANGE UP (ref 5–17)
BASOPHILS # BLD AUTO: 0.01 K/UL — SIGNIFICANT CHANGE UP (ref 0–0.2)
BASOPHILS NFR BLD AUTO: 0.1 % — SIGNIFICANT CHANGE UP (ref 0–2)
BLD GP AB SCN SERPL QL: NEGATIVE — SIGNIFICANT CHANGE UP
BUN SERPL-MCNC: 21 MG/DL — SIGNIFICANT CHANGE UP (ref 7–23)
CALCIUM SERPL-MCNC: 10.4 MG/DL — SIGNIFICANT CHANGE UP (ref 8.4–10.5)
CANCER AG125 SERPL-ACNC: 48 U/ML — HIGH
CEA SERPL-MCNC: 20.5 NG/ML — HIGH (ref 0–3.8)
CHLORIDE SERPL-SCNC: 100 MMOL/L — SIGNIFICANT CHANGE UP (ref 96–108)
CO2 SERPL-SCNC: 28 MMOL/L — SIGNIFICANT CHANGE UP (ref 22–31)
CREAT SERPL-MCNC: 0.98 MG/DL — SIGNIFICANT CHANGE UP (ref 0.5–1.3)
EOSINOPHIL # BLD AUTO: 0 K/UL — SIGNIFICANT CHANGE UP (ref 0–0.5)
EOSINOPHIL NFR BLD AUTO: 0 % — SIGNIFICANT CHANGE UP (ref 0–6)
GLUCOSE BLDC GLUCOMTR-MCNC: 132 MG/DL — HIGH (ref 70–99)
GLUCOSE BLDC GLUCOMTR-MCNC: 140 MG/DL — HIGH (ref 70–99)
GLUCOSE BLDC GLUCOMTR-MCNC: 143 MG/DL — HIGH (ref 70–99)
GLUCOSE BLDC GLUCOMTR-MCNC: 144 MG/DL — HIGH (ref 70–99)
GLUCOSE SERPL-MCNC: 142 MG/DL — HIGH (ref 70–99)
HCT VFR BLD CALC: 40.6 % — SIGNIFICANT CHANGE UP (ref 34.5–45)
HGB BLD-MCNC: 13 G/DL — SIGNIFICANT CHANGE UP (ref 11.5–15.5)
IMM GRANULOCYTES NFR BLD AUTO: 0.3 % — SIGNIFICANT CHANGE UP (ref 0–1.5)
LYMPHOCYTES # BLD AUTO: 0.84 K/UL — LOW (ref 1–3.3)
LYMPHOCYTES # BLD AUTO: 7.1 % — LOW (ref 13–44)
MAGNESIUM SERPL-MCNC: 2.2 MG/DL — SIGNIFICANT CHANGE UP (ref 1.6–2.6)
MCHC RBC-ENTMCNC: 28.2 PG — SIGNIFICANT CHANGE UP (ref 27–34)
MCHC RBC-ENTMCNC: 32 GM/DL — SIGNIFICANT CHANGE UP (ref 32–36)
MCV RBC AUTO: 88.1 FL — SIGNIFICANT CHANGE UP (ref 80–100)
MONOCYTES # BLD AUTO: 0.99 K/UL — HIGH (ref 0–0.9)
MONOCYTES NFR BLD AUTO: 8.4 % — SIGNIFICANT CHANGE UP (ref 2–14)
NEUTROPHILS # BLD AUTO: 9.92 K/UL — HIGH (ref 1.8–7.4)
NEUTROPHILS NFR BLD AUTO: 84.1 % — HIGH (ref 43–77)
PHOSPHATE SERPL-MCNC: 2.9 MG/DL — SIGNIFICANT CHANGE UP (ref 2.5–4.5)
PLATELET # BLD AUTO: 312 K/UL — SIGNIFICANT CHANGE UP (ref 150–400)
POTASSIUM SERPL-MCNC: 4.2 MMOL/L — SIGNIFICANT CHANGE UP (ref 3.5–5.3)
POTASSIUM SERPL-SCNC: 4.2 MMOL/L — SIGNIFICANT CHANGE UP (ref 3.5–5.3)
RBC # BLD: 4.61 M/UL — SIGNIFICANT CHANGE UP (ref 3.8–5.2)
RBC # FLD: 15.3 % — HIGH (ref 10.3–14.5)
RH IG SCN BLD-IMP: POSITIVE — SIGNIFICANT CHANGE UP
SODIUM SERPL-SCNC: 138 MMOL/L — SIGNIFICANT CHANGE UP (ref 135–145)
TSH SERPL-MCNC: 0.91 UIU/ML — SIGNIFICANT CHANGE UP (ref 0.27–4.2)
WBC # BLD: 11.8 K/UL — HIGH (ref 3.8–10.5)
WBC # FLD AUTO: 11.8 K/UL — HIGH (ref 3.8–10.5)

## 2018-09-15 PROCEDURE — 99233 SBSQ HOSP IP/OBS HIGH 50: CPT | Mod: GC

## 2018-09-15 PROCEDURE — 99232 SBSQ HOSP IP/OBS MODERATE 35: CPT | Mod: 57

## 2018-09-15 PROCEDURE — 99233 SBSQ HOSP IP/OBS HIGH 50: CPT

## 2018-09-15 RX ORDER — DIPHENHYDRAMINE HCL 50 MG
25 CAPSULE ORAL ONCE
Qty: 0 | Refills: 0 | Status: COMPLETED | OUTPATIENT
Start: 2018-09-15 | End: 2018-09-15

## 2018-09-15 RX ORDER — DIPHENHYDRAMINE HCL 50 MG
25 CAPSULE ORAL EVERY 24 HOURS
Qty: 0 | Refills: 0 | Status: DISCONTINUED | OUTPATIENT
Start: 2018-09-15 | End: 2018-09-18

## 2018-09-15 RX ADMIN — HYDROMORPHONE HYDROCHLORIDE 4 MILLIGRAM(S): 2 INJECTION INTRAMUSCULAR; INTRAVENOUS; SUBCUTANEOUS at 17:51

## 2018-09-15 RX ADMIN — ENOXAPARIN SODIUM 40 MILLIGRAM(S): 100 INJECTION SUBCUTANEOUS at 22:09

## 2018-09-15 RX ADMIN — HYDROMORPHONE HYDROCHLORIDE 4 MILLIGRAM(S): 2 INJECTION INTRAMUSCULAR; INTRAVENOUS; SUBCUTANEOUS at 22:10

## 2018-09-15 RX ADMIN — HYDROMORPHONE HYDROCHLORIDE 4 MILLIGRAM(S): 2 INJECTION INTRAMUSCULAR; INTRAVENOUS; SUBCUTANEOUS at 10:04

## 2018-09-15 RX ADMIN — Medication 4 MILLIGRAM(S): at 17:51

## 2018-09-15 RX ADMIN — HYDROMORPHONE HYDROCHLORIDE 4 MILLIGRAM(S): 2 INJECTION INTRAMUSCULAR; INTRAVENOUS; SUBCUTANEOUS at 15:00

## 2018-09-15 RX ADMIN — Medication 100 MILLIGRAM(S): at 05:41

## 2018-09-15 RX ADMIN — Medication 25 MILLIGRAM(S): at 01:02

## 2018-09-15 RX ADMIN — HYDROMORPHONE HYDROCHLORIDE 4 MILLIGRAM(S): 2 INJECTION INTRAMUSCULAR; INTRAVENOUS; SUBCUTANEOUS at 22:40

## 2018-09-15 RX ADMIN — Medication 100 MILLIGRAM(S): at 22:09

## 2018-09-15 RX ADMIN — Medication 100 MILLIGRAM(S): at 14:29

## 2018-09-15 RX ADMIN — HYDROMORPHONE HYDROCHLORIDE 4 MILLIGRAM(S): 2 INJECTION INTRAMUSCULAR; INTRAVENOUS; SUBCUTANEOUS at 14:31

## 2018-09-15 RX ADMIN — HYDROMORPHONE HYDROCHLORIDE 4 MILLIGRAM(S): 2 INJECTION INTRAMUSCULAR; INTRAVENOUS; SUBCUTANEOUS at 02:06

## 2018-09-15 RX ADMIN — SENNA PLUS 2 TABLET(S): 8.6 TABLET ORAL at 22:10

## 2018-09-15 RX ADMIN — HYDROMORPHONE HYDROCHLORIDE 4 MILLIGRAM(S): 2 INJECTION INTRAMUSCULAR; INTRAVENOUS; SUBCUTANEOUS at 10:34

## 2018-09-15 RX ADMIN — HYDROMORPHONE HYDROCHLORIDE 4 MILLIGRAM(S): 2 INJECTION INTRAMUSCULAR; INTRAVENOUS; SUBCUTANEOUS at 02:36

## 2018-09-15 RX ADMIN — HYDROMORPHONE HYDROCHLORIDE 1 MILLIGRAM(S): 2 INJECTION INTRAMUSCULAR; INTRAVENOUS; SUBCUTANEOUS at 04:12

## 2018-09-15 RX ADMIN — Medication 100 MILLIGRAM(S): at 14:30

## 2018-09-15 RX ADMIN — HYDROMORPHONE HYDROCHLORIDE 4 MILLIGRAM(S): 2 INJECTION INTRAMUSCULAR; INTRAVENOUS; SUBCUTANEOUS at 18:20

## 2018-09-15 RX ADMIN — HYDROMORPHONE HYDROCHLORIDE 4 MILLIGRAM(S): 2 INJECTION INTRAMUSCULAR; INTRAVENOUS; SUBCUTANEOUS at 07:05

## 2018-09-15 RX ADMIN — LACTULOSE 20 GRAM(S): 10 SOLUTION ORAL at 13:00

## 2018-09-15 RX ADMIN — HYDROMORPHONE HYDROCHLORIDE 4 MILLIGRAM(S): 2 INJECTION INTRAMUSCULAR; INTRAVENOUS; SUBCUTANEOUS at 06:35

## 2018-09-15 RX ADMIN — Medication 4 MILLIGRAM(S): at 13:00

## 2018-09-15 RX ADMIN — Medication 4 MILLIGRAM(S): at 05:41

## 2018-09-15 RX ADMIN — Medication 100 MILLIGRAM(S): at 22:10

## 2018-09-15 RX ADMIN — HYDROMORPHONE HYDROCHLORIDE 1 MILLIGRAM(S): 2 INJECTION INTRAMUSCULAR; INTRAVENOUS; SUBCUTANEOUS at 04:27

## 2018-09-15 RX ADMIN — LOSARTAN POTASSIUM 50 MILLIGRAM(S): 100 TABLET, FILM COATED ORAL at 05:41

## 2018-09-15 NOTE — PROGRESS NOTE ADULT - ASSESSMENT
74 y/o F with HTN, prediabetes and chronic back pain 2/2 spinal stenosis p/w worsening of chronic back pain.  Pt reports pain in lower back with R sided sciatica since early 2017.  She had imaging in 2017 which revealed lumbar spinal stenosis.  She had been prescribed gabapentin as well as percocet which she only needed sparingly.  Pt recently has developed worsening lower back pain with paresthesia radiating to L thigh.  Pain makes it difficult to walk, and she feels that her L leg is weaker.  Pt has been unable to lay flat, or stand 2/2 the pain and has spent most of her time sitting.  She has been taking percocet without relief.  She has chronic incontinence, but no new symptoms.  She has been constipated which she attributes to taking percocet more frequently.  She has not had foot numbness or saddle anesthesia.  She had imaging done 2 weeks ago including MRI which showed an infiltrative process at L2 vertebral body, lumbar spinal stenosis and RP soft tissue mass.  CT a/p showed signs suspicious of uterine ca with mets to bone and lung.  Pt was referred to an oncologist with appointment scheduled later this week, but due to increasing severity of pain, she was advised to come to the ED by her PCP.       At [facility] patient recieved CT guided biopsy of L2 lesion. Tx to [facility] for further work up and management of spinal lesion on 9/12/18        PLAN:  Neuro:   - plan for angiogram/embolization followed by OR on Monday for L2 corpectomy, T12-L4 fusion  - pain control- continue dilaudid po and  IV dilaudid for breakthrough pain  - continue decadron 4q6 with fingersticks  - continue bedrest, pt has back brace at bedside   - Palliative following for pain management  - f/u pathology of L2 lesion   - Gyn saw pt at [facility] and will continue to follow, awaiting pathology results, (pager [phone])  Respiratory:   - encouraged incentive spirometry  - lung nodules on CT chest- suspicious for metastatic disease  CV:  - HTN- continue losartan  DVT ppx:   - venodynes, sq lovenox  GI:    - pt had BM 9/14  - continue bowel regimen  PT/OT:   - TBD after surgery    Abnormal Nurtitional Status: [] malnurtition (see nutrition note), [ ]underweight: BMI < 19, [x] morbid obesity: BMI >40, [] Cachexia      Spectralink # [id number]

## 2018-09-15 NOTE — PROGRESS NOTE ADULT - PROBLEM SELECTOR PLAN 3
Outpatient CT suggest uterine mass w/ mets to spine and lung   S/p CT guided biopsy of paraspinal mass at L2. Follow up pathology results (likely early this week).  Please consult GYN- needs endometrial mass biopsy  Onc input appreciated, radiation if not resectable  Ordered , CEA, CA 19-9

## 2018-09-15 NOTE — PROGRESS NOTE ADULT - ASSESSMENT
73F h/o morbid obesity (BMI 47.3), HTN, prediabetes, and chronic back pain 2/2 spinal stenosis, p/w worsening of chronic back pain, imaging suspicious for uterine ca with mets to spine and lung, transferred here for neurosurgery eval for L2 lytic lesion.

## 2018-09-15 NOTE — PROGRESS NOTE ADULT - SUBJECTIVE AND OBJECTIVE BOX
SUBJECTIVE: Pt seen and examined, lying in bed, pain is c    OVERNIGHT EVENTS:     Vital Signs Last 24 Hrs  T(C): 36.8 (15 Sep 2018 14:33), Max: 37.2 (14 Sep 2018 21:19)  T(F): 98.2 (15 Sep 2018 14:33), Max: 98.9 (14 Sep 2018 21:19)  HR: 80 (15 Sep 2018 14:33) (75 - 85)  BP: 147/75 (15 Sep 2018 14:33) (115/69 - 152/84)  BP(mean): --  RR: 18 (15 Sep 2018 14:33) (18 - 18)  SpO2: 95% (15 Sep 2018 14:33) (94% - 98%)    PHYSICAL EXAM:    General: No Acute Distress     Neurological: Awake, alert oriented to person, place and time, Following Commands, PERRL, EOMI, Face Symmetrical, Speech Fluent, Moving all extremities, Muscle Strength normal in all four extremities, No Drift, Sensation to Light Touch Intact    Pulmonary: Clear to Auscultation, No Rales, No Rhonchi, No Wheezes     Cardiovascular: S1, S2, Regular Rate and Rhythm     Gastrointestinal: Soft, Nontender, Nondistended     Incision:     LABS:   09-15    138  |  100  |  21  ----------------------------<  142<H>  4.2   |  28  |  0.98    Ca    10.4      15 Sep 2018 14:08  Phos  2.9     09-15  Mg     2.2     09-15      Hemoglobin A1C, Whole Blood: 6.1 % (09-13 @ 08:06)      09-14 @ 07:01  -  09-15 @ 07:00  --------------------------------------------------------  IN: 1500 mL / OUT: 1650 mL / NET: -150 mL    09-15 @ 07:01  -  09-15 @ 14:59  --------------------------------------------------------  IN: 920 mL / OUT: 800 mL / NET: 120 mL      DRAINS:     MEDICATIONS:  Antibiotics:  ceFAZolin   IVPB 1000 milliGRAM(s) IV Intermittent every 8 hours    Neuro:  acetaminophen   Tablet .. 650 milliGRAM(s) Oral every 6 hours PRN Temp greater or equal to 38C (100.4F), Mild Pain (1 - 3)  HYDROmorphone   Tablet 2 milliGRAM(s) Oral every 4 hours PRN Moderate Pain (4 - 6)  HYDROmorphone   Tablet 4 milliGRAM(s) Oral every 4 hours  HYDROmorphone  Injectable 1 milliGRAM(s) IV Push every 3 hours PRN breakthru pain  ondansetron Injectable 4 milliGRAM(s) IV Push every 6 hours PRN Nausea and/or Vomiting    Cardiac:  losartan 50 milliGRAM(s) Oral daily    Pulm:    GI/:  docusate sodium 100 milliGRAM(s) Oral three times a day  lactulose Syrup 20 Gram(s) Oral every 6 hours  senna 2 Tablet(s) Oral at bedtime PRN Constipation    Other:   dexamethasone  Injectable 4 milliGRAM(s) IV Push every 6 hours  dextrose 40% Gel 15 Gram(s) Oral once PRN Blood Glucose LESS THAN 70 milliGRAM(s)/deciliter  dextrose 5%. 1000 milliLiter(s) IV Continuous <Continuous>  dextrose 50% Injectable 12.5 Gram(s) IV Push once  dextrose 50% Injectable 25 Gram(s) IV Push once  dextrose 50% Injectable 25 Gram(s) IV Push once  enoxaparin Injectable 40 milliGRAM(s) SubCutaneous at bedtime  glucagon  Injectable 1 milliGRAM(s) IntraMuscular once PRN Glucose LESS THAN 70 milligrams/deciliter  influenza   Vaccine 0.5 milliLiter(s) IntraMuscular once  insulin lispro (HumaLOG) corrective regimen sliding scale   SubCutaneous three times a day before meals  insulin lispro (HumaLOG) corrective regimen sliding scale   SubCutaneous at bedtime    DIET: [] Regular [] CCD [] Renal [] Puree [] Dysphagia [] Tube Feeds:     IMAGING:   < from: CT Chest w/ IV Cont (09.12.18 @ 09:29) >    There are no pathologically enlarged bilateral axillary, mediastinal or   hilar lymph nodes. There are small left hilar calcified lymph nodes   related to a prior granulomatous infection. The heart size is normal.   There is no pericardial effusion. Coronary artery calcifications are   noted. There are no pleural effusions.    For complete evaluation of the abdominal organs, please refer to the   recently performed dedicated abdominal CT of September 6, 2018.    Evaluation of the lungs demonstrate emphysema. There is a dominant 7 mm   left lower lobe calcified granuloma. There are a few punctatecalcified   granulomas involving the upper aspect of the left fissure. There is a 2.2   cm left lower lobe pulmonary nodule part of which is associated with the   fissure. There is a 2.1 cm right upper lobe pulmonary, part of which is   associated with the minor fissure. There are 3 right upper lobe small   pulmonary nodules with the largest measuring about 1 cm on image 32 of   series 2. There are no central endobronchial lesions.    Evaluation of the bones demonstrate degenerative changes of the spine.    IMPRESSION: A few bilateral pulmonary nodules as above worrisome for   metastatic disease.    < end of copied text >  < from: CT Lumbar Spine w/ IV Cont (09.12.18 @ 09:52) >  INTERPRETATION:  History: Uterine mass. Metastatic workup. Staging CT   scan of the spine.     Multiple axial sections were performed through the cervical thoracic and   lumbar spine region. Coronal and sagittal reconstructions were performed   as well.    Cervical spine:    Loss of normal cervical lordosis is seen which could be due to   positioning or muscle spasm    The vertebral body height appears normal.    Bilateral hypertrophic facet joint changes are seen at multiple levels   which are secondary to degenerative changes.    No acute fractures or dislocations are identified.    No abnormal lytic or blastic lesions are seen.    Evaluation of the paraspinal soft tissues is limited by lack of IV   contrast though grossly unremarkable.    There are no acute fractures or dislocations identified.    Thoracic spine:    Increased kyphosis is seen.    Scoliosis.    Schmorl's nodes seen multiple levels which are secondary to degenerative   change    Calcification is seen involving the T7-8, T8-9 and T9-10 to T10-T11 disc   space which is secondary degenerative changes    There are no acute fractures ordislocations identified.    No abnormal lytic or blastic lesions seen.    Evaluation of the paraspinal soft tissues is limited due to lack of IV   contrast.    Abnormal soft tissue lesions are seen involving the lungs bilaterally.   These findings islikely compatible with underlying metastasis.    Clips are seen in the region of gallbladder compatible with prior surgery.    Lumbar spine:    Scoliosis is seen.    Loss of the normal lumbar lordosis is seen.    Abnormal lytic lesion is seen involving the central to left portion of   the L2 vertebral body. There is extensive paraspinal extension of this   process seen as well as epidural extension identified which does appear   to cause moderate narrowing of the spinal canal and mass effect on the   thecal sac. MRI can be done to better characterize this finding if   clinically indicated and there are no contraindications.     Disc bulges, disc osteophytes and bilateral hypertrophic facet changes   are seen at multiple levels which are secondary to degenerative changes.   These findings cause mild narrowing of the spinal canal at the T12-L1,   L1-2, L3-4 and L5-S1 levels and moderate narrowing of the spinal canal at   the L2-3 and L4-5 levels.    Impression: Degenerative changes as described above.    Abnormal lytic lesion involving the L2 vertebral body of the paraspinal   epidural extension of tumor seen. This likely compatible with underlying   metastasis given patient's history.    Abnormal lesion is seen involving the lungs bilaterally.      < end of copied text >

## 2018-09-15 NOTE — PROGRESS NOTE ADULT - SUBJECTIVE AND OBJECTIVE BOX
Khris Garcia MD  ([facility] Hospitalist)  Pager [phone]  (During off hours please page [phone])      Patient is a 73y old  Female who presents with a chief complaint of L2 lesion (14 Sep 2018 18:13)      SUBJECTIVE / OVERNIGHT EVENTS:    MEDICATIONS  (STANDING):  ceFAZolin   IVPB 1000 milliGRAM(s) IV Intermittent every 8 hours  dexamethasone  Injectable 4 milliGRAM(s) IV Push every 6 hours  docusate sodium 100 milliGRAM(s) Oral three times a day  enoxaparin Injectable 40 milliGRAM(s) SubCutaneous at bedtime  HYDROmorphone   Tablet 4 milliGRAM(s) Oral every 4 hours  influenza   Vaccine 0.5 milliLiter(s) IntraMuscular once  insulin lispro (HumaLOG) corrective regimen sliding scale   SubCutaneous three times a day before meals  insulin lispro (HumaLOG) corrective regimen sliding scale   SubCutaneous at bedtime  lactulose Syrup 20 Gram(s) Oral every 6 hours  losartan 50 milliGRAM(s) Oral daily    MEDICATIONS  (PRN):  acetaminophen   Tablet .. 650 milliGRAM(s) Oral every 6 hours PRN Temp greater or equal to 38C (100.4F), Mild Pain (1 - 3)  dextrose 40% Gel 15 Gram(s) Oral once PRN Blood Glucose LESS THAN 70 milliGRAM(s)/deciliter  glucagon  Injectable 1 milliGRAM(s) IntraMuscular once PRN Glucose LESS THAN 70 milligrams/deciliter  HYDROmorphone   Tablet 2 milliGRAM(s) Oral every 4 hours PRN Moderate Pain (4 - 6)  HYDROmorphone  Injectable 1 milliGRAM(s) IV Push every 3 hours PRN breakthru pain  ondansetron Injectable 4 milliGRAM(s) IV Push every 6 hours PRN Nausea and/or Vomiting  senna 2 Tablet(s) Oral at bedtime PRN Constipation      Vital Signs Last 24 Hrs  T(C): 36.6 (15 Sep 2018 08:21), Max: 37.2 (14 Sep 2018 21:19)  T(F): 97.8 (15 Sep 2018 08:21), Max: 98.9 (14 Sep 2018 21:19)  HR: 80 (15 Sep 2018 08:21) (75 - 95)  BP: 152/84 (15 Sep 2018 08:21) (115/69 - 152/84)  BP(mean): --  RR: 18 (15 Sep 2018 08:21) (18 - 18)  SpO2: 98% (15 Sep 2018 08:21) (94% - 98%)  CAPILLARY BLOOD GLUCOSE      POCT Blood Glucose.: 132 mg/dL (15 Sep 2018 08:10)  POCT Blood Glucose.: 144 mg/dL (14 Sep 2018 22:07)  POCT Blood Glucose.: 149 mg/dL (14 Sep 2018 17:25)    I&O's Summary    14 Sep 2018 07:01  -  15 Sep 2018 07:00  --------------------------------------------------------  IN: 1500 mL / OUT: 1650 mL / NET: -150 mL    15 Sep 2018 07:01  -  15 Sep 2018 12:09  --------------------------------------------------------  IN: 320 mL / OUT: 500 mL / NET: -180 mL        PHYSICAL EXAM:  GENERAL: NAD  HEAD:  Atraumatic, Normocephalic  EYES: EOMI, conjunctiva and sclera clear  NECK: Supple, No JVD  CHEST/LUNG: Clear to auscultation bilaterally; No wheeze  HEART: Regular rate and rhythm; S1S2  ABDOMEN: Soft, Nontender, obese; Bowel sounds present  EXTREMITIES:  2+ Peripheral Pulses, No clubbing, cyanosis, +1+ LE edema L>R  PSYCH: AAOx3  NEUROLOGY: CN's intact, LLE 3/5 strength and 5/5 in other extremities   SKIN: mild erythema of LLE        RADIOLOGY & ADDITIONAL TESTS:  MR L-spine: Expansile metastatic infiltration of vertebral body of L2 which is destroyed with associated large left paraspinal and epidural soft tissue component extending into the left neural foramen and spinal canal compressing the descending roots resulting in moderate central canal, severe left lateral recess and foraminal stenosis. Soft tissue mass extends into the left hemipelvis / iliopsoas region. Partially imaged heterogeneous mass in the pelvis likely of uterine origin. Fullness in the renal collecting system, left greater than right, hydronephrosis versus parapelvic cysts.    Consultant(s) Notes Reviewed: palliative care, neurosurgery    Care Discussed with Consultants/Other Providers: Neurosurgery re plan of care

## 2018-09-15 NOTE — PROGRESS NOTE ADULT - PROBLEM SELECTOR PLAN 1
-likely neoplasm related pain - metastatic bone lesion of L2 causing severe lower back pain   -pain control w/ Dilaudid IV/PO prn   -Decadron as per neurosurgery   -Possible OR Monday

## 2018-09-15 NOTE — PROGRESS NOTE ADULT - PROBLEM SELECTOR PLAN 4
Cellulitis of LLE diagnosed at [facility] (improving on antibiotics)  Continue Ancef for 7 days total   Blood cultures NGTD  LE dopplers negative for DVT.

## 2018-09-16 LAB
ANION GAP SERPL CALC-SCNC: 11 MMOL/L — SIGNIFICANT CHANGE UP (ref 5–17)
APTT BLD: 30.8 SEC — SIGNIFICANT CHANGE UP (ref 27.5–37.4)
BACTERIA BLD CULT: SIGNIFICANT CHANGE UP
BACTERIA BLD CULT: SIGNIFICANT CHANGE UP
BUN SERPL-MCNC: 24 MG/DL — HIGH (ref 7–23)
CALCIUM SERPL-MCNC: 10.3 MG/DL — SIGNIFICANT CHANGE UP (ref 8.4–10.5)
CANCER AG19-9 SERPL-ACNC: 193.3 U/ML — HIGH
CHLORIDE SERPL-SCNC: 97 MMOL/L — SIGNIFICANT CHANGE UP (ref 96–108)
CO2 SERPL-SCNC: 28 MMOL/L — SIGNIFICANT CHANGE UP (ref 22–31)
CREAT SERPL-MCNC: 0.98 MG/DL — SIGNIFICANT CHANGE UP (ref 0.5–1.3)
GLUCOSE BLDC GLUCOMTR-MCNC: 133 MG/DL — HIGH (ref 70–99)
GLUCOSE BLDC GLUCOMTR-MCNC: 140 MG/DL — HIGH (ref 70–99)
GLUCOSE BLDC GLUCOMTR-MCNC: 146 MG/DL — HIGH (ref 70–99)
GLUCOSE BLDC GLUCOMTR-MCNC: 154 MG/DL — HIGH (ref 70–99)
GLUCOSE SERPL-MCNC: 146 MG/DL — HIGH (ref 70–99)
HCT VFR BLD CALC: 42.4 % — SIGNIFICANT CHANGE UP (ref 34.5–45)
HGB BLD-MCNC: 13.4 G/DL — SIGNIFICANT CHANGE UP (ref 11.5–15.5)
INR BLD: 1.09 RATIO — SIGNIFICANT CHANGE UP (ref 0.88–1.16)
MCHC RBC-ENTMCNC: 28.1 PG — SIGNIFICANT CHANGE UP (ref 27–34)
MCHC RBC-ENTMCNC: 31.6 GM/DL — LOW (ref 32–36)
MCV RBC AUTO: 88.9 FL — SIGNIFICANT CHANGE UP (ref 80–100)
PLATELET # BLD AUTO: 331 K/UL — SIGNIFICANT CHANGE UP (ref 150–400)
POTASSIUM SERPL-MCNC: 4.2 MMOL/L — SIGNIFICANT CHANGE UP (ref 3.5–5.3)
POTASSIUM SERPL-SCNC: 4.2 MMOL/L — SIGNIFICANT CHANGE UP (ref 3.5–5.3)
PROTHROM AB SERPL-ACNC: 12.3 SEC — SIGNIFICANT CHANGE UP (ref 10–13.1)
RBC # BLD: 4.77 M/UL — SIGNIFICANT CHANGE UP (ref 3.8–5.2)
RBC # FLD: 14.8 % — HIGH (ref 10.3–14.5)
SODIUM SERPL-SCNC: 136 MMOL/L — SIGNIFICANT CHANGE UP (ref 135–145)
WBC # BLD: 11.3 K/UL — HIGH (ref 3.8–10.5)
WBC # FLD AUTO: 11.3 K/UL — HIGH (ref 3.8–10.5)

## 2018-09-16 PROCEDURE — 99233 SBSQ HOSP IP/OBS HIGH 50: CPT

## 2018-09-16 PROCEDURE — 99231 SBSQ HOSP IP/OBS SF/LOW 25: CPT | Mod: 57

## 2018-09-16 RX ORDER — DEXTROSE MONOHYDRATE, SODIUM CHLORIDE, AND POTASSIUM CHLORIDE 50; .745; 4.5 G/1000ML; G/1000ML; G/1000ML
1000 INJECTION, SOLUTION INTRAVENOUS
Qty: 0 | Refills: 0 | Status: DISCONTINUED | OUTPATIENT
Start: 2018-09-16 | End: 2018-09-18

## 2018-09-16 RX ADMIN — HYDROMORPHONE HYDROCHLORIDE 4 MILLIGRAM(S): 2 INJECTION INTRAMUSCULAR; INTRAVENOUS; SUBCUTANEOUS at 18:03

## 2018-09-16 RX ADMIN — Medication 100 MILLIGRAM(S): at 05:42

## 2018-09-16 RX ADMIN — LACTULOSE 20 GRAM(S): 10 SOLUTION ORAL at 12:51

## 2018-09-16 RX ADMIN — Medication 25 MILLIGRAM(S): at 00:25

## 2018-09-16 RX ADMIN — HYDROMORPHONE HYDROCHLORIDE 4 MILLIGRAM(S): 2 INJECTION INTRAMUSCULAR; INTRAVENOUS; SUBCUTANEOUS at 02:13

## 2018-09-16 RX ADMIN — HYDROMORPHONE HYDROCHLORIDE 4 MILLIGRAM(S): 2 INJECTION INTRAMUSCULAR; INTRAVENOUS; SUBCUTANEOUS at 10:24

## 2018-09-16 RX ADMIN — HYDROMORPHONE HYDROCHLORIDE 4 MILLIGRAM(S): 2 INJECTION INTRAMUSCULAR; INTRAVENOUS; SUBCUTANEOUS at 18:33

## 2018-09-16 RX ADMIN — HYDROMORPHONE HYDROCHLORIDE 4 MILLIGRAM(S): 2 INJECTION INTRAMUSCULAR; INTRAVENOUS; SUBCUTANEOUS at 10:54

## 2018-09-16 RX ADMIN — Medication 100 MILLIGRAM(S): at 21:13

## 2018-09-16 RX ADMIN — Medication 4 MILLIGRAM(S): at 12:51

## 2018-09-16 RX ADMIN — HYDROMORPHONE HYDROCHLORIDE 4 MILLIGRAM(S): 2 INJECTION INTRAMUSCULAR; INTRAVENOUS; SUBCUTANEOUS at 14:32

## 2018-09-16 RX ADMIN — HYDROMORPHONE HYDROCHLORIDE 4 MILLIGRAM(S): 2 INJECTION INTRAMUSCULAR; INTRAVENOUS; SUBCUTANEOUS at 05:42

## 2018-09-16 RX ADMIN — LOSARTAN POTASSIUM 50 MILLIGRAM(S): 100 TABLET, FILM COATED ORAL at 05:42

## 2018-09-16 RX ADMIN — Medication 4 MILLIGRAM(S): at 18:04

## 2018-09-16 RX ADMIN — HYDROMORPHONE HYDROCHLORIDE 4 MILLIGRAM(S): 2 INJECTION INTRAMUSCULAR; INTRAVENOUS; SUBCUTANEOUS at 02:43

## 2018-09-16 RX ADMIN — Medication 100 MILLIGRAM(S): at 14:34

## 2018-09-16 RX ADMIN — HYDROMORPHONE HYDROCHLORIDE 1 MILLIGRAM(S): 2 INJECTION INTRAMUSCULAR; INTRAVENOUS; SUBCUTANEOUS at 13:21

## 2018-09-16 RX ADMIN — HYDROMORPHONE HYDROCHLORIDE 1 MILLIGRAM(S): 2 INJECTION INTRAMUSCULAR; INTRAVENOUS; SUBCUTANEOUS at 12:51

## 2018-09-16 RX ADMIN — HYDROMORPHONE HYDROCHLORIDE 1 MILLIGRAM(S): 2 INJECTION INTRAMUSCULAR; INTRAVENOUS; SUBCUTANEOUS at 21:11

## 2018-09-16 RX ADMIN — LACTULOSE 20 GRAM(S): 10 SOLUTION ORAL at 05:44

## 2018-09-16 RX ADMIN — HYDROMORPHONE HYDROCHLORIDE 4 MILLIGRAM(S): 2 INJECTION INTRAMUSCULAR; INTRAVENOUS; SUBCUTANEOUS at 06:12

## 2018-09-16 RX ADMIN — HYDROMORPHONE HYDROCHLORIDE 4 MILLIGRAM(S): 2 INJECTION INTRAMUSCULAR; INTRAVENOUS; SUBCUTANEOUS at 22:39

## 2018-09-16 RX ADMIN — Medication 4 MILLIGRAM(S): at 05:41

## 2018-09-16 RX ADMIN — HYDROMORPHONE HYDROCHLORIDE 4 MILLIGRAM(S): 2 INJECTION INTRAMUSCULAR; INTRAVENOUS; SUBCUTANEOUS at 23:16

## 2018-09-16 RX ADMIN — HYDROMORPHONE HYDROCHLORIDE 4 MILLIGRAM(S): 2 INJECTION INTRAMUSCULAR; INTRAVENOUS; SUBCUTANEOUS at 15:00

## 2018-09-16 RX ADMIN — HYDROMORPHONE HYDROCHLORIDE 1 MILLIGRAM(S): 2 INJECTION INTRAMUSCULAR; INTRAVENOUS; SUBCUTANEOUS at 21:26

## 2018-09-16 RX ADMIN — LACTULOSE 20 GRAM(S): 10 SOLUTION ORAL at 18:04

## 2018-09-16 RX ADMIN — Medication 4 MILLIGRAM(S): at 00:25

## 2018-09-16 NOTE — PROGRESS NOTE ADULT - PROBLEM SELECTOR PLAN 3
Outpatient CT suggest uterine mass w/ mets to spine and lung   S/p CT guided biopsy of paraspinal mass at L2. Follow up pathology results (likely early this week).  GYN consult pending- may require biopsy of endometrial mass  Onc input appreciated, radiation if not resectable  Elevation of:  (48), CEA (20.5), CA 19-9 (193.3)

## 2018-09-16 NOTE — PROGRESS NOTE ADULT - PROBLEM SELECTOR PLAN 4
Cellulitis of LLE diagnosed at [facility] (improving on antibiotics)  Continue Ancef for 7 days total (s/p 4 days)  Blood cultures NGTD  LE dopplers negative for DVT.

## 2018-09-16 NOTE — PROGRESS NOTE ADULT - SUBJECTIVE AND OBJECTIVE BOX
SUBJECTIVE: some pain and discomfort laying flat, want to be OOB.    OVERNIGHT EVENTS: None    Vital Signs Last 24 Hrs  T(C): 36.8 (16 Sep 2018 05:39), Max: 36.8 (15 Sep 2018 14:33)  T(F): 98.2 (16 Sep 2018 05:39), Max: 98.3 (15 Sep 2018 16:45)  HR: 97 (16 Sep 2018 05:39) (74 - 97)  BP: 117/90 (16 Sep 2018 05:39) (117/90 - 154/80)  BP(mean): --  RR: 18 (16 Sep 2018 05:39) (18 - 18)  SpO2: 95% (16 Sep 2018 05:39) (95% - 97%)  IVF: [X ] IVL [ ] NS+K@   DIET: [ ] Regular [X ] CCD-NPO MN [ ] Renal [ ] Puree [ ] Dysphagia [ ] Tube Feeds:   PCA: [ ] YES [X ] NO   WILSON: [ ] YES [X ] NO [X ] VOID   BM: [X ] YES-on 9/14    DRAINS: N/A    PHYSICAL EXAM:    Constitutional: No Acute Distress     Neurological: Awake, alert oriented to person, place and time, Following Commands, PERRL, EOMI, Face Symmetrical, Speech Fluent, Moving all extremities, Muscle Strength normal in all four extremities, No Drift,                                                  Sensation: [X] intact to light touch  [] decreased:     Pulmonary: Clear to Auscultation, No rales, No rhonchi, No wheezes     Cardiovascular: S1, S2, Regular rate and rhythm     Gastrointestinal: Soft, Non-tender, Non-distended     Extremities: No calf tenderness     Incision: n/a    LABS:                        13.4   11.30 )-----------( 331      ( 16 Sep 2018 07:54 )             42.4    09-16    136  |  97  |  24<H>  ----------------------------<  146<H>  4.2   |  28  |  0.98    Ca    10.3      16 Sep 2018 06:25  Phos  2.9     09-15  Mg     2.2     09-15    PT/INR - ( 16 Sep 2018 07:59 )   PT: 12.3 sec;   INR: 1.09 ratio    PTT - ( 16 Sep 2018 07:59 )  PTT:30.8 sec    IMAGING: < from: MR Lumbar Spine w/wo IV Cont (09.13.18 @ 17:49) >  IMPRESSION:  Expansile metastatic infiltration of vertebral body of L2 which is   destroyed with associated large left paraspinal and epidural soft tissue   component extending into the left neural foramen and spinal canal   compressing the descending roots resulting in moderate central canal,   severe left lateral recess and foraminal stenosis. Soft tissue mass   extends into the left hemipelvis / iliopsoas region.    Partially imaged heterogeneous mass in the pelvis likely of uterine   origin.    Fullness in the renal collecting system, left greater than right,   hydronephrosis versus parapelvic cysts, as this region is partially   imaged correlation with dedicated abdominal/pelvic imaging is suggested    MEDICATIONS  (STANDING):  ceFAZolin   IVPB 1000 milliGRAM(s) IV Intermittent every 8 hours  dexamethasone  Injectable 4 milliGRAM(s) IV Push every 6 hours  dextrose 5%. 1000 milliLiter(s) (50 mL/Hr) IV Continuous <Continuous>  dextrose 50% Injectable 12.5 Gram(s) IV Push once  dextrose 50% Injectable 25 Gram(s) IV Push once  dextrose 50% Injectable 25 Gram(s) IV Push once  docusate sodium 100 milliGRAM(s) Oral three times a day  enoxaparin Injectable 40 milliGRAM(s) SubCutaneous at bedtime  HYDROmorphone   Tablet 4 milliGRAM(s) Oral every 4 hours  influenza   Vaccine 0.5 milliLiter(s) IntraMuscular once  insulin lispro (HumaLOG) corrective regimen sliding scale   SubCutaneous three times a day before meals  insulin lispro (HumaLOG) corrective regimen sliding scale   SubCutaneous at bedtime  lactulose Syrup 20 Gram(s) Oral every 6 hours  losartan 50 milliGRAM(s) Oral daily    MEDICATIONS  (PRN):  acetaminophen   Tablet .. 650 milliGRAM(s) Oral every 6 hours PRN Temp greater or equal to 38C (100.4F), Mild Pain (1 - 3)  dextrose 40% Gel 15 Gram(s) Oral once PRN Blood Glucose LESS THAN 70 milliGRAM(s)/deciliter  diphenhydrAMINE   Capsule 25 milliGRAM(s) Oral every 24 hours PRN Insomnia  glucagon  Injectable 1 milliGRAM(s) IntraMuscular once PRN Glucose LESS THAN 70 milligrams/deciliter  HYDROmorphone   Tablet 2 milliGRAM(s) Oral every 4 hours PRN Moderate Pain (4 - 6)  HYDROmorphone  Injectable 1 milliGRAM(s) IV Push every 3 hours PRN breakthru pain  ondansetron Injectable 4 milliGRAM(s) IV Push every 6 hours PRN Nausea and/or Vomiting  senna 2 Tablet(s) Oral at bedtime PRN Constipation

## 2018-09-16 NOTE — PROGRESS NOTE ADULT - SUBJECTIVE AND OBJECTIVE BOX
Khris Garcia MD  ([facility] Hospitalist)  Pager [phone]  (During off hours please page [phone])      Patient is a 73y old  Female who presents with a chief complaint of L2 lesion (15 Sep 2018 14:59)      SUBJECTIVE / OVERNIGHT EVENTS: No events overnight. C/o back pain while laying in bed.     MEDICATIONS  (STANDING):  ceFAZolin   IVPB 1000 milliGRAM(s) IV Intermittent every 8 hours  dexamethasone  Injectable 4 milliGRAM(s) IV Push every 6 hours  docusate sodium 100 milliGRAM(s) Oral three times a day  enoxaparin Injectable 40 milliGRAM(s) SubCutaneous at bedtime  HYDROmorphone   Tablet 4 milliGRAM(s) Oral every 4 hours  influenza   Vaccine 0.5 milliLiter(s) IntraMuscular once  insulin lispro (HumaLOG) corrective regimen sliding scale   SubCutaneous three times a day before meals  insulin lispro (HumaLOG) corrective regimen sliding scale   SubCutaneous at bedtime  lactulose Syrup 20 Gram(s) Oral every 6 hours  losartan 50 milliGRAM(s) Oral daily    MEDICATIONS  (PRN):  acetaminophen   Tablet .. 650 milliGRAM(s) Oral every 6 hours PRN Temp greater or equal to 38C (100.4F), Mild Pain (1 - 3)  dextrose 40% Gel 15 Gram(s) Oral once PRN Blood Glucose LESS THAN 70 milliGRAM(s)/deciliter  diphenhydrAMINE   Capsule 25 milliGRAM(s) Oral every 24 hours PRN Insomnia  glucagon  Injectable 1 milliGRAM(s) IntraMuscular once PRN Glucose LESS THAN 70 milligrams/deciliter  HYDROmorphone   Tablet 2 milliGRAM(s) Oral every 4 hours PRN Moderate Pain (4 - 6)  HYDROmorphone  Injectable 1 milliGRAM(s) IV Push every 3 hours PRN breakthru pain  ondansetron Injectable 4 milliGRAM(s) IV Push every 6 hours PRN Nausea and/or Vomiting  senna 2 Tablet(s) Oral at bedtime PRN Constipation      Vital Signs Last 24 Hrs  T(C): 36.8 (16 Sep 2018 05:39), Max: 36.8 (15 Sep 2018 14:33)  T(F): 98.2 (16 Sep 2018 05:39), Max: 98.3 (15 Sep 2018 16:45)  HR: 97 (16 Sep 2018 05:39) (74 - 97)  BP: 117/90 (16 Sep 2018 05:39) (117/90 - 154/80)  BP(mean): --  RR: 18 (16 Sep 2018 05:39) (18 - 18)  SpO2: 95% (16 Sep 2018 05:39) (95% - 97%)  CAPILLARY BLOOD GLUCOSE      POCT Blood Glucose.: 146 mg/dL (16 Sep 2018 09:36)  POCT Blood Glucose.: 143 mg/dL (15 Sep 2018 22:01)  POCT Blood Glucose.: 144 mg/dL (15 Sep 2018 16:56)  POCT Blood Glucose.: 140 mg/dL (15 Sep 2018 12:06)    I&O's Summary    15 Sep 2018 07:01  -  16 Sep 2018 07:00  --------------------------------------------------------  IN: 1420 mL / OUT: 2350 mL / NET: -930 mL        PHYSICAL EXAM:  GENERAL: elderly female, in bed, NAD  HEAD:  Atraumatic, Normocephalic  EYES: EOMI, conjunctiva and sclera clear  NECK: Supple, No JVD  CHEST/LUNG: Clear to auscultation bilaterally; No wheeze  HEART: Regular rate and rhythm; S1S2  ABDOMEN: Soft, Nontender, obese; Bowel sounds present  EXTREMITIES:  2+ Peripheral Pulses, No clubbing, cyanosis, 1+ LE edema L>R  PSYCH: AAOx3  NEUROLOGY: CN's intact, LLE 3/5 strength and 5/5 in other extremities   SKIN: mild erythema of LLE      LABS:                        13.4   11.30 )-----------( 331      ( 16 Sep 2018 07:54 )             42.4     09-16    136  |  97  |  24<H>  ----------------------------<  146<H>  4.2   |  28  |  0.98    Ca    10.3      16 Sep 2018 06:25  Phos  2.9     09-15  Mg     2.2     09-15      PT/INR - ( 16 Sep 2018 07:59 )   PT: 12.3 sec;   INR: 1.09 ratio    PTT - ( 16 Sep 2018 07:59 )  PTT:30.8 sec      RADIOLOGY & ADDITIONAL TESTS:    Consultant(s) Notes Reviewed: Neurosurgery, palliative care    Care Discussed with Consultants/Other Providers: Neurosurgery re plan of care, angio tomorrow

## 2018-09-16 NOTE — PROGRESS NOTE ADULT - PROBLEM SELECTOR PLAN 1
-likely neoplasm related pain - metastatic bone lesion of L2 causing severe lower back pain   - Palliative care following to help with pain management.  - On Dilaudid IV/PO PRN  - Decadron 4mg IV q6h as per neurosurgery   - Angiogram planned for tomorrow, surgery possibly on Tuesday

## 2018-09-17 ENCOUNTER — APPOINTMENT (OUTPATIENT)
Dept: NEUROSURGERY | Facility: HOSPITAL | Age: 73
End: 2018-09-17
Payer: MEDICARE

## 2018-09-17 ENCOUNTER — TRANSCRIPTION ENCOUNTER (OUTPATIENT)
Age: 73
End: 2018-09-17

## 2018-09-17 LAB
ANION GAP SERPL CALC-SCNC: 11 MMOL/L — SIGNIFICANT CHANGE UP (ref 5–17)
BUN SERPL-MCNC: 27 MG/DL — HIGH (ref 7–23)
CALCIUM SERPL-MCNC: 9.6 MG/DL — SIGNIFICANT CHANGE UP (ref 8.4–10.5)
CHLORIDE SERPL-SCNC: 102 MMOL/L — SIGNIFICANT CHANGE UP (ref 96–108)
CO2 SERPL-SCNC: 23 MMOL/L — SIGNIFICANT CHANGE UP (ref 22–31)
CREAT SERPL-MCNC: 0.86 MG/DL — SIGNIFICANT CHANGE UP (ref 0.5–1.3)
GLUCOSE BLDC GLUCOMTR-MCNC: 110 MG/DL — HIGH (ref 70–99)
GLUCOSE BLDC GLUCOMTR-MCNC: 126 MG/DL — HIGH (ref 70–99)
GLUCOSE BLDC GLUCOMTR-MCNC: 156 MG/DL — HIGH (ref 70–99)
GLUCOSE SERPL-MCNC: 188 MG/DL — HIGH (ref 70–99)
HCT VFR BLD CALC: 38.5 % — SIGNIFICANT CHANGE UP (ref 34.5–45)
HGB BLD-MCNC: 13.3 G/DL — SIGNIFICANT CHANGE UP (ref 11.5–15.5)
MAGNESIUM SERPL-MCNC: 2.1 MG/DL — SIGNIFICANT CHANGE UP (ref 1.6–2.6)
MCHC RBC-ENTMCNC: 29.9 PG — SIGNIFICANT CHANGE UP (ref 27–34)
MCHC RBC-ENTMCNC: 34.6 GM/DL — SIGNIFICANT CHANGE UP (ref 32–36)
MCV RBC AUTO: 86.5 FL — SIGNIFICANT CHANGE UP (ref 80–100)
NON-GYNECOLOGICAL CYTOLOGY STUDY: SIGNIFICANT CHANGE UP
PHOSPHATE SERPL-MCNC: 3.2 MG/DL — SIGNIFICANT CHANGE UP (ref 2.5–4.5)
PLATELET # BLD AUTO: 283 K/UL — SIGNIFICANT CHANGE UP (ref 150–400)
POTASSIUM SERPL-MCNC: 4.5 MMOL/L — SIGNIFICANT CHANGE UP (ref 3.5–5.3)
POTASSIUM SERPL-SCNC: 4.5 MMOL/L — SIGNIFICANT CHANGE UP (ref 3.5–5.3)
RBC # BLD: 4.45 M/UL — SIGNIFICANT CHANGE UP (ref 3.8–5.2)
RBC # FLD: 14.2 % — SIGNIFICANT CHANGE UP (ref 10.3–14.5)
SODIUM SERPL-SCNC: 136 MMOL/L — SIGNIFICANT CHANGE UP (ref 135–145)
WBC # BLD: 15.1 K/UL — HIGH (ref 3.8–10.5)
WBC # FLD AUTO: 15.1 K/UL — HIGH (ref 3.8–10.5)

## 2018-09-17 PROCEDURE — 99291 CRITICAL CARE FIRST HOUR: CPT

## 2018-09-17 PROCEDURE — 36245 INS CATH ABD/L-EXT ART 1ST: CPT | Mod: 59

## 2018-09-17 PROCEDURE — 75705 ARTERY X-RAYS SPINE: CPT | Mod: 26,59

## 2018-09-17 PROCEDURE — 37243 VASC EMBOLIZE/OCCLUDE ORGAN: CPT

## 2018-09-17 PROCEDURE — 36246 INS CATH ABD/L-EXT ART 2ND: CPT

## 2018-09-17 RX ORDER — HYDROMORPHONE HYDROCHLORIDE 2 MG/ML
4 INJECTION INTRAMUSCULAR; INTRAVENOUS; SUBCUTANEOUS EVERY 4 HOURS
Qty: 0 | Refills: 0 | Status: DISCONTINUED | OUTPATIENT
Start: 2018-09-17 | End: 2018-09-18

## 2018-09-17 RX ORDER — ONDANSETRON 8 MG/1
4 TABLET, FILM COATED ORAL EVERY 4 HOURS
Qty: 0 | Refills: 0 | Status: DISCONTINUED | OUTPATIENT
Start: 2018-09-17 | End: 2018-09-17

## 2018-09-17 RX ORDER — HYDROMORPHONE HYDROCHLORIDE 2 MG/ML
0.25 INJECTION INTRAMUSCULAR; INTRAVENOUS; SUBCUTANEOUS ONCE
Qty: 0 | Refills: 0 | Status: DISCONTINUED | OUTPATIENT
Start: 2018-09-17 | End: 2018-09-17

## 2018-09-17 RX ORDER — HYDROMORPHONE HYDROCHLORIDE 2 MG/ML
0.5 INJECTION INTRAMUSCULAR; INTRAVENOUS; SUBCUTANEOUS
Qty: 0 | Refills: 0 | Status: DISCONTINUED | OUTPATIENT
Start: 2018-09-17 | End: 2018-09-17

## 2018-09-17 RX ADMIN — HYDROMORPHONE HYDROCHLORIDE 4 MILLIGRAM(S): 2 INJECTION INTRAMUSCULAR; INTRAVENOUS; SUBCUTANEOUS at 03:00

## 2018-09-17 RX ADMIN — Medication 100 MILLIGRAM(S): at 22:04

## 2018-09-17 RX ADMIN — HYDROMORPHONE HYDROCHLORIDE 1 MILLIGRAM(S): 2 INJECTION INTRAMUSCULAR; INTRAVENOUS; SUBCUTANEOUS at 22:16

## 2018-09-17 RX ADMIN — Medication 100 MILLIGRAM(S): at 05:43

## 2018-09-17 RX ADMIN — DEXTROSE MONOHYDRATE, SODIUM CHLORIDE, AND POTASSIUM CHLORIDE 100 MILLILITER(S): 50; .745; 4.5 INJECTION, SOLUTION INTRAVENOUS at 12:33

## 2018-09-17 RX ADMIN — Medication 4 MILLIGRAM(S): at 23:42

## 2018-09-17 RX ADMIN — HYDROMORPHONE HYDROCHLORIDE 0.5 MILLIGRAM(S): 2 INJECTION INTRAMUSCULAR; INTRAVENOUS; SUBCUTANEOUS at 12:36

## 2018-09-17 RX ADMIN — HYDROMORPHONE HYDROCHLORIDE 0.5 MILLIGRAM(S): 2 INJECTION INTRAMUSCULAR; INTRAVENOUS; SUBCUTANEOUS at 14:15

## 2018-09-17 RX ADMIN — Medication 4 MILLIGRAM(S): at 00:15

## 2018-09-17 RX ADMIN — Medication 100 MILLIGRAM(S): at 14:22

## 2018-09-17 RX ADMIN — HYDROMORPHONE HYDROCHLORIDE 4 MILLIGRAM(S): 2 INJECTION INTRAMUSCULAR; INTRAVENOUS; SUBCUTANEOUS at 05:43

## 2018-09-17 RX ADMIN — HYDROMORPHONE HYDROCHLORIDE 0.25 MILLIGRAM(S): 2 INJECTION INTRAMUSCULAR; INTRAVENOUS; SUBCUTANEOUS at 12:30

## 2018-09-17 RX ADMIN — HYDROMORPHONE HYDROCHLORIDE 1 MILLIGRAM(S): 2 INJECTION INTRAMUSCULAR; INTRAVENOUS; SUBCUTANEOUS at 22:30

## 2018-09-17 RX ADMIN — DEXTROSE MONOHYDRATE, SODIUM CHLORIDE, AND POTASSIUM CHLORIDE 100 MILLILITER(S): 50; .745; 4.5 INJECTION, SOLUTION INTRAVENOUS at 00:35

## 2018-09-17 RX ADMIN — HYDROMORPHONE HYDROCHLORIDE 4 MILLIGRAM(S): 2 INJECTION INTRAMUSCULAR; INTRAVENOUS; SUBCUTANEOUS at 02:31

## 2018-09-17 RX ADMIN — HYDROMORPHONE HYDROCHLORIDE 0.25 MILLIGRAM(S): 2 INJECTION INTRAMUSCULAR; INTRAVENOUS; SUBCUTANEOUS at 12:19

## 2018-09-17 RX ADMIN — HYDROMORPHONE HYDROCHLORIDE 0.5 MILLIGRAM(S): 2 INJECTION INTRAMUSCULAR; INTRAVENOUS; SUBCUTANEOUS at 14:30

## 2018-09-17 RX ADMIN — LOSARTAN POTASSIUM 50 MILLIGRAM(S): 100 TABLET, FILM COATED ORAL at 05:44

## 2018-09-17 RX ADMIN — HYDROMORPHONE HYDROCHLORIDE 0.5 MILLIGRAM(S): 2 INJECTION INTRAMUSCULAR; INTRAVENOUS; SUBCUTANEOUS at 16:14

## 2018-09-17 RX ADMIN — HYDROMORPHONE HYDROCHLORIDE 4 MILLIGRAM(S): 2 INJECTION INTRAMUSCULAR; INTRAVENOUS; SUBCUTANEOUS at 06:13

## 2018-09-17 RX ADMIN — HYDROMORPHONE HYDROCHLORIDE 4 MILLIGRAM(S): 2 INJECTION INTRAMUSCULAR; INTRAVENOUS; SUBCUTANEOUS at 19:30

## 2018-09-17 RX ADMIN — HYDROMORPHONE HYDROCHLORIDE 0.5 MILLIGRAM(S): 2 INJECTION INTRAMUSCULAR; INTRAVENOUS; SUBCUTANEOUS at 13:10

## 2018-09-17 RX ADMIN — HYDROMORPHONE HYDROCHLORIDE 0.5 MILLIGRAM(S): 2 INJECTION INTRAMUSCULAR; INTRAVENOUS; SUBCUTANEOUS at 15:50

## 2018-09-17 RX ADMIN — Medication 4 MILLIGRAM(S): at 18:24

## 2018-09-17 RX ADMIN — HYDROMORPHONE HYDROCHLORIDE 0.5 MILLIGRAM(S): 2 INJECTION INTRAMUSCULAR; INTRAVENOUS; SUBCUTANEOUS at 12:56

## 2018-09-17 RX ADMIN — Medication 25 MILLIGRAM(S): at 00:25

## 2018-09-17 RX ADMIN — HYDROMORPHONE HYDROCHLORIDE 4 MILLIGRAM(S): 2 INJECTION INTRAMUSCULAR; INTRAVENOUS; SUBCUTANEOUS at 20:07

## 2018-09-17 RX ADMIN — Medication 25 MILLIGRAM(S): at 23:30

## 2018-09-17 RX ADMIN — Medication 4 MILLIGRAM(S): at 12:47

## 2018-09-17 RX ADMIN — Medication 4 MILLIGRAM(S): at 05:44

## 2018-09-17 RX ADMIN — HYDROMORPHONE HYDROCHLORIDE 0.5 MILLIGRAM(S): 2 INJECTION INTRAMUSCULAR; INTRAVENOUS; SUBCUTANEOUS at 12:55

## 2018-09-17 NOTE — CHART NOTE - NSCHARTNOTEFT_GEN_A_CORE
Interventional Neuro Radiology  Pre-Procedure Note FELICIA    This is a 73 year old right hand dominant female with hypertension, prediabetes and chronic back pain 2/2 spinal stenosis p/w worsening of chronic back pain. Patient reports pain in lower back with R sided sciatica since early 2017.  She had imaging in 2017 which revealed lumbar spinal stenosis.  She had been prescribed gabapentin as well as percocet which she only needed sparingly.  Pt recently has developed worsening lower back pain with paresthesia radiating to L thigh.  Pain makes it difficult to walk, and she feels that her L leg is weaker.  Pt has been unable to lay flat, or stand 2/2 the pain and has spent most of her time sitting.  She has been taking percocet without relief.  She has chronic incontinence, but no new symptoms.  She has been constipated which she attributes to taking percocet more frequently.  She has not had foot numbness or saddle anesthesia.  She had imaging done 2 weeks ago including MRI which showed an infiltrative process at L2 vertebral body, lumbar spinal stenosis and RP soft tissue mass.  CT a/p showed signs suspicious of uterine ca with mets to bone and lung.        Allergies: No Known Allergies   PMHX: lumbar spinal stenosis prediabetes hypertension  PSHX:right knee arthroscopy, dilation and curettage, cholecystectomy  Social History:   FAMILY HISTORY: non-contributory     Current Medications:   acetaminophen   Tablet .. 650 milliGRAM(s) Oral every 6 hours PRN  ceFAZolin   IVPB 1000 milliGRAM(s) IV Intermittent every 8 hours  dexamethasone  Injectable 4 milliGRAM(s) IV Push every 6 hours  dextrose 40% Gel 15 Gram(s) Oral once PRN  dextrose 5%. 1000 milliLiter(s) IV Continuous <Continuous>  dextrose 50% Injectable 12.5 Gram(s) IV Push once  dextrose 50% Injectable 25 Gram(s) IV Push once  dextrose 50% Injectable 25 Gram(s) IV Push once  diphenhydrAMINE   Capsule 25 milliGRAM(s) Oral every 24 hours PRN  docusate sodium 100 milliGRAM(s) Oral three times a day  glucagon  Injectable 1 milliGRAM(s) IntraMuscular once PRN  HYDROmorphone   Tablet 2 milliGRAM(s) Oral every 4 hours PRN  HYDROmorphone   Tablet 4 milliGRAM(s) Oral every 4 hours  HYDROmorphone  Injectable 1 milliGRAM(s) IV Push every 3 hours PRN  influenza   Vaccine 0.5 milliLiter(s) IntraMuscular once  insulin lispro (HumaLOG) corrective regimen sliding scale    insulin lispro (HumaLOG) corrective regimen sliding scale     lactulose Syrup 20 Gram(s) Oral every 6 hours  losartan 50 milliGRAM(s) Oral daily  ondansetron Injectable 4 milliGRAM(s) IV Push every 6 hours PRN  senna 2 Tablet(s) Oral at bedtime PRN  sodium chloride 0.9% with potassium chloride 20 mEq/L 1000    CBC                        13.4   11.30 )-----------( 331                   42.4       BMP    136  |  97  |  24  ----------------------------<  146  4.2   |  28  |  0.98    Blood Bank: 0 positive     Assessment/Plan:   This is a 73 year old right hand dominant female with an L2 spinal lesion   Procedure, goals, risks, benefits and alternatives were discussed with patient and patient's family. All questions were answered. Risks include but are not limited to stroke, vessel injury, hemorrhage, and or right groin hematoma. Patient demonstrates understanding of all risks involved with this procedure and wishes to continue. Appropriate content was obtained from patient and consent is in the patient's chart. Interventional Neuro Radiology  Pre-Procedure Note FELICIA    This is a 73 year old right hand dominant female with hypertension, prediabetes and chronic back pain 2/2 spinal stenosis p/w worsening of chronic back pain. Patient reports pain in lower back with R sided sciatica since early 2017.  She had imaging in 2017 which revealed lumbar spinal stenosis.  She had been prescribed gabapentin as well as percocet which she only needed sparingly.  Pt recently has developed worsening lower back pain with paresthesia radiating to L thigh.  Pain makes it difficult to walk, and she feels that her Left leg is weaker.  Pt has been unable to lay flat, or stand 2/2 the pain and has spent most of her time sitting. She has chronic incontinence, but no new symptoms. She has been constipated which she attributes to taking percocet more frequently. She has not had foot numbness or saddle anesthesia. She had imaging done 2 weeks ago including MRI which showed an infiltrative process at L2 vertebral body, lumbar spinal stenosis and RP soft tissue mass. Patient is transported to Neuro IR for a selective spinal angiography, prior to surgery tomorrow to study lesions and vessels.     Allergies: No Known Allergies   PMHX: lumbar spinal stenosis prediabetes hypertension  PSHX:right knee arthroscopy, dilation and curettage, cholecystectomy  Social History: non-smoker  FAMILY HISTORY: non-contributory     Current Medications:   acetaminophen   Tablet .. 650 milliGRAM(s) Oral every 6 hours PRN  ceFAZolin   IVPB 1000 milliGRAM(s) IV Intermittent every 8 hours  dexamethasone  Injectable 4 milliGRAM(s) IV Push every 6 hours  dextrose 40% Gel 15 Gram(s) Oral once PRN  dextrose 5%. 1000 milliLiter(s) IV Continuous <Continuous>  dextrose 50% Injectable 12.5 Gram(s) IV Push once  dextrose 50% Injectable 25 Gram(s) IV Push once  dextrose 50% Injectable 25 Gram(s) IV Push once  diphenhydrAMINE   Capsule 25 milliGRAM(s) Oral every 24 hours PRN  docusate sodium 100 milliGRAM(s) Oral three times a day  glucagon  Injectable 1 milliGRAM(s) IntraMuscular once PRN  HYDROmorphone   Tablet 2 milliGRAM(s) Oral every 4 hours PRN  HYDROmorphone   Tablet 4 milliGRAM(s) Oral every 4 hours  HYDROmorphone  Injectable 1 milliGRAM(s) IV Push every 3 hours PRN  influenza   Vaccine 0.5 milliLiter(s) IntraMuscular once  insulin lispro (HumaLOG) corrective regimen sliding scale    insulin lispro (HumaLOG) corrective regimen sliding scale     lactulose Syrup 20 Gram(s) Oral every 6 hours  losartan 50 milliGRAM(s) Oral daily  ondansetron Injectable 4 milliGRAM(s) IV Push every 6 hours PRN  senna 2 Tablet(s) Oral at bedtime PRN  sodium chloride 0.9% with potassium chloride 20 mEq/L 1000    CBC                        13.4   11.30 )-----------( 331                   42.4       BMP    136  |  97  |  24  ----------------------------<  146  4.2   |  28  |  0.98    Blood Bank: 0 positive     Assessment/Plan:   This is a 73 year old right hand dominant female with an L2 spinal lesion here in Neuro IR prior to surgery tomorrow. Procedure, goals, risks, benefits and alternatives were discussed with patient. All questions were answered. Risks include but are not limited to stroke, vessel injury, hemorrhage, and or right groin hematoma. Patient demonstrates understanding of all risks involved with this procedure and wishes to continue. Appropriate content was obtained from patient and consent is in the patient's chart.

## 2018-09-17 NOTE — PROGRESS NOTE ADULT - ASSESSMENT
INCOMPLETE ASSESSMENT/PLAN:  s/p  spinal angiography/emboilzation of spinal tumor.     NEURO:    Neurochecks q1 hrs,  Decadron per Neurosurgery   Plan for OR resection of tumor tomorrow     Pain control , safe guard to be removed s/p 6 hrs   Activity: [] mobilize as tolerated [x] Bedrest [] PT [] OT [] PMNR    PULM:  O2 sat > 92%, incentive spirometry     CV:  keep A-line for OR tomorrow   HTN: c/w home meds of losartan   MAP > 65    RENAL:  Fluids:  NS @ 100    GI:    Diet: advance diet as tolerated - NPO at midnight   GI prophylaxis [x] not indicated [] PPI [] other:  Bowel regimen [x] colace [x] senna [] other:    ENDO:   Goal euglycemia (-180), ISS     HEME/ONC:  VTE prophylaxis: [] SCDs [] chemoprophylaxis [x] hold chemoprophylaxis due to: fresh post op [] high risk of DVT/PE on admission due to:    ID:  afebrile, periop antibiotics     SOCIAL/FAMILY:  [] awaiting [x] updated at bedside [] family meeting    CODE STATUS:  [x] Full Code [] DNR [] DNI [] Palliative/Comfort Care    DISPOSITION:  [x] ICU [] Stroke Unit [] Floor [] EMU [] RCU [] PCU    [x] Patient is at high risk of neurologic deterioration/death due to:  spinal hematoma    Time seen:  Time spent: ___ [] critical care minutes    Contact: [phone] ASSESSMENT/PLAN:  s/p  spinal angiography/emboilzation of spinal tumor.     NEURO:    Neurochecks q1 hrs,  Decadron per Neurosurgery   Plan for OR resection of tumor tomorrow     Pain control , safe guard to be removed s/p 6 hrs   Activity: [] mobilize as tolerated [x] Bedrest [] PT [] OT [] PMNR    PULM:  O2 sat > 92%, incentive spirometry     CV:  keep A-line for OR tomorrow   HTN: c/w home meds of losartan   MAP > 65    RENAL:  Fluids:  NS @ 100    GI:    Diet: advance diet as tolerated - NPO at midnight   GI prophylaxis [x] not indicated [] PPI [] other:  Bowel regimen [x] colace [x] senna [] other:    ENDO:   Goal euglycemia (-180), ISS     HEME/ONC:  VTE prophylaxis: [] SCDs [] chemoprophylaxis [x] hold chemoprophylaxis due to: fresh post op [] high risk of DVT/PE on admission due to:    ID:  afebrile, periop antibiotics     SOCIAL/FAMILY:  [] awaiting [x] updated at bedside [] family meeting    CODE STATUS:  [x] Full Code [] DNR [] DNI [] Palliative/Comfort Care    DISPOSITION:  [x] ICU [] Stroke Unit [] Floor [] EMU [] RCU [] PCU    [x] Patient is at high risk of neurologic deterioration/death due to:  spinal hematoma    Time seen:  Time spent: __45_ [] critical care minutes    Contact: [phone]

## 2018-09-17 NOTE — PROGRESS NOTE ADULT - SUBJECTIVE AND OBJECTIVE BOX
HPI:  Ms. Son is a 74 y/o F with HTN, prediabetes and chronic back pain 2/2 spinal stenosis p/w worsening of chronic back pain.  Pt reports pain in lower back with R sided sciatica since early 2017.  She had imaging in 2017 which revealed lumbar spinal stenosis.  She had been prescribed gabapentin as well as percocet which she only needed sparingly.  Pt recently has developed worsening lower back pain with paresthesia radiating to L thigh.  Pain makes it difficult to walk, and she feels that her L leg is weaker.  Pt has been unable to lay flat, or stand 2/2 the pain and has spent most of her time sitting.  She has been taking percocet without relief.  She has chronic incontinence, but no new symptoms.  She has been constipated which she attributes to taking percocet more frequently.  She has not had foot numbness or saddle anesthesia.  She had imaging done 2 weeks ago including MRI which showed an infiltrative process at L2 vertebral body, lumbar spinal stenosis and RP soft tissue mass.  CT a/p showed signs suspicious of uterine ca with mets to bone and lung.  Pt was referred to an oncologist with appointment scheduled later this week, but due to increasing severity of pain, she was advised to come to the ED by her PCP.         s/p  spinal angiography/embo L1 to L5 and bilateral iliacs was performed and demonstrated vascular supply from Left L2 which contributes to posterior spinal artery and tumor.     On admission, the patient was:  GCS:  Gentile-Springer:  modified Gale:  ICH score:  NIHSS:      VITALS:  T(C): , Max: 37.1 (09-16-18 @ 21:32)  HR:  (46 - 72)  BP:  (119/71 - 162/70)  ABP:  (146/53 - 168/70)  RR:  (16 - 23)  SpO2:  (93% - 100%)  Wt(kg): --      09-16-18 @ 07:01  -  09-17-18 @ 07:00  --------------------------------------------------------  IN: 780 mL / OUT: 1150 mL / NET: -370 mL    09-17-18 @ 07:01 - 09-17-18 @ 16:19  --------------------------------------------------------  IN: 450 mL / OUT: 865 mL / NET: -415 mL      LABS:  Na: 136 (09-16 @ 06:25), 138 (09-15 @ 14:08)  K: 4.2 (09-16 @ 06:25), 4.2 (09-15 @ 14:08)  Cl: 97 (09-16 @ 06:25), 100 (09-15 @ 14:08)  CO2: 28 (09-16 @ 06:25), 28 (09-15 @ 14:08)  BUN: 24 (09-16 @ 06:25), 21 (09-15 @ 14:08)  Cr: 0.98 (09-16 @ 06:25), 0.98 (09-15 @ 14:08)  Glu: 146(09-16 @ 06:25), 142(09-15 @ 14:08)    Hgb: 13.4 (09-16 @ 07:54), 13.0 (09-15 @ 15:26)  Hct: 42.4 (09-16 @ 07:54), 40.6 (09-15 @ 15:26)  WBC: 11.30 (09-16 @ 07:54), 11.80 (09-15 @ 15:26)  Plt: 331 (09-16 @ 07:54), 312 (09-15 @ 15:26)    INR: 1.09 09-16-18 @ 07:59  PTT: 30.8 09-16-18 @ 07:59    acetaminophen   Tablet .. 650 milliGRAM(s) Oral every 6 hours PRN  ceFAZolin   IVPB 1000 milliGRAM(s) IV Intermittent every 8 hours  dexamethasone  Injectable 4 milliGRAM(s) IV Push every 6 hours  dextrose 40% Gel 15 Gram(s) Oral once PRN  dextrose 5%. 1000 milliLiter(s) IV Continuous <Continuous>  dextrose 50% Injectable 12.5 Gram(s) IV Push once  dextrose 50% Injectable 25 Gram(s) IV Push once  dextrose 50% Injectable 25 Gram(s) IV Push once  diphenhydrAMINE   Capsule 25 milliGRAM(s) Oral every 24 hours PRN  docusate sodium 100 milliGRAM(s) Oral three times a day  glucagon  Injectable 1 milliGRAM(s) IntraMuscular once PRN  HYDROmorphone   Tablet 2 milliGRAM(s) Oral every 4 hours PRN  HYDROmorphone   Tablet 4 milliGRAM(s) Oral every 4 hours PRN  HYDROmorphone  Injectable 1 milliGRAM(s) IV Push every 3 hours PRN  influenza   Vaccine 0.5 milliLiter(s) IntraMuscular once  insulin lispro (HumaLOG) corrective regimen sliding scale   SubCutaneous three times a day before meals  insulin lispro (HumaLOG) corrective regimen sliding scale   SubCutaneous at bedtime  lactulose Syrup 20 Gram(s) Oral every 6 hours  losartan 50 milliGRAM(s) Oral daily  ondansetron Injectable 4 milliGRAM(s) IV Push every 6 hours PRN  senna 2 Tablet(s) Oral at bedtime PRN  sodium chloride 0.9% with potassium chloride 20 mEq/L 1000 milliLiter(s) IV Continuous <Continuous>      IMAGING:   Recent imaging studies were reviewed.    EXAMINATION:  General:  calm  HEENT:  MMM  Neuro:  awake, alert, oriented x 3, follows commands,  Pupils Equal and reactive, RUE 5/5,  LUE 5/5,  RLE 5/5,  LLE 5/5, no drift   Cards:  s1, s2 RRR  Respiratory:  lungs clear b/l   Adomen:  soft  Extremities:  no edema  Skin:  warm/dry    DEVICES:   [] Restraints [] PIVs [] ET tube [] central line [] PICC [] arterial line [] frederick [] NGT/OGT [] EVD [] LD [] NIMCO/HMV [] LiCOX [] ICP monitor [] Trach [] PEG [] Chest Tube [] other: HPI:  Ms. Son is a 72 y/o F with HTN, prediabetes and chronic back pain 2/2 spinal stenosis p/w worsening of chronic back pain.  Pt reports pain in lower back with R sided sciatica since early 2017.  She had imaging in 2017 which revealed lumbar spinal stenosis.  She had been prescribed gabapentin as well as percocet which she only needed sparingly.  Pt recently has developed worsening lower back pain with paresthesia radiating to L thigh.  Pain makes it difficult to walk, and she feels that her L leg is weaker.  Pt has been unable to lay flat, or stand 2/2 the pain and has spent most of her time sitting.  She has been taking percocet without relief.  She has chronic incontinence, but no new symptoms.  She has been constipated which she attributes to taking percocet more frequently.  She has not had foot numbness or saddle anesthesia.  She had imaging done 2 weeks ago including MRI which showed an infiltrative process at L2 vertebral body, lumbar spinal stenosis and RP soft tissue mass.  CT a/p showed signs suspicious of uterine ca with mets to bone and lung.  Pt was referred to an oncologist with appointment scheduled later this week, but due to increasing severity of pain, she was advised to come to the ED by her PCP.         s/p  spinal angiography/emboilzation of spinal tumor.     On admission, the patient was:  GCS:  Gentile-Springer:  modified Gale:  ICH score:  NIHSS:      VITALS:  T(C): , Max: 37.1 (09-16-18 @ 21:32)  HR:  (46 - 72)  BP:  (119/71 - 162/70)  ABP:  (146/53 - 168/70)  RR:  (16 - 23)  SpO2:  (93% - 100%)  Wt(kg): --      09-16-18 @ 07:01  -  09-17-18 @ 07:00  --------------------------------------------------------  IN: 780 mL / OUT: 1150 mL / NET: -370 mL    09-17-18 @ 07:01  -  09-17-18 @ 16:19  --------------------------------------------------------  IN: 450 mL / OUT: 865 mL / NET: -415 mL      LABS:  Na: 136 (09-16 @ 06:25), 138 (09-15 @ 14:08)  K: 4.2 (09-16 @ 06:25), 4.2 (09-15 @ 14:08)  Cl: 97 (09-16 @ 06:25), 100 (09-15 @ 14:08)  CO2: 28 (09-16 @ 06:25), 28 (09-15 @ 14:08)  BUN: 24 (09-16 @ 06:25), 21 (09-15 @ 14:08)  Cr: 0.98 (09-16 @ 06:25), 0.98 (09-15 @ 14:08)  Glu: 146(09-16 @ 06:25), 142(09-15 @ 14:08)    Hgb: 13.4 (09-16 @ 07:54), 13.0 (09-15 @ 15:26)  Hct: 42.4 (09-16 @ 07:54), 40.6 (09-15 @ 15:26)  WBC: 11.30 (09-16 @ 07:54), 11.80 (09-15 @ 15:26)  Plt: 331 (09-16 @ 07:54), 312 (09-15 @ 15:26)    INR: 1.09 09-16-18 @ 07:59  PTT: 30.8 09-16-18 @ 07:59    acetaminophen   Tablet .. 650 milliGRAM(s) Oral every 6 hours PRN  ceFAZolin   IVPB 1000 milliGRAM(s) IV Intermittent every 8 hours  dexamethasone  Injectable 4 milliGRAM(s) IV Push every 6 hours  dextrose 40% Gel 15 Gram(s) Oral once PRN  dextrose 5%. 1000 milliLiter(s) IV Continuous <Continuous>  dextrose 50% Injectable 12.5 Gram(s) IV Push once  dextrose 50% Injectable 25 Gram(s) IV Push once  dextrose 50% Injectable 25 Gram(s) IV Push once  diphenhydrAMINE   Capsule 25 milliGRAM(s) Oral every 24 hours PRN  docusate sodium 100 milliGRAM(s) Oral three times a day  glucagon  Injectable 1 milliGRAM(s) IntraMuscular once PRN  HYDROmorphone   Tablet 2 milliGRAM(s) Oral every 4 hours PRN  HYDROmorphone   Tablet 4 milliGRAM(s) Oral every 4 hours PRN  HYDROmorphone  Injectable 1 milliGRAM(s) IV Push every 3 hours PRN  influenza   Vaccine 0.5 milliLiter(s) IntraMuscular once  insulin lispro (HumaLOG) corrective regimen sliding scale   SubCutaneous three times a day before meals  insulin lispro (HumaLOG) corrective regimen sliding scale   SubCutaneous at bedtime  lactulose Syrup 20 Gram(s) Oral every 6 hours  losartan 50 milliGRAM(s) Oral daily  ondansetron Injectable 4 milliGRAM(s) IV Push every 6 hours PRN  senna 2 Tablet(s) Oral at bedtime PRN  sodium chloride 0.9% with potassium chloride 20 mEq/L 1000 milliLiter(s) IV Continuous <Continuous>      IMAGING:   Recent imaging studies were reviewed.    EXAMINATION:  General:  calm  HEENT:  MMM  Neuro:  awake, alert, oriented x 3, follows commands,  Pupils Equal and reactive, RUE 5/5,  LUE 5/5,  RLE 5/5,  LLE 5/5, no drift   Cards:  s1, s2 RRR  Respiratory:  lungs clear b/l   Adomen:  soft  Extremities:  no edema, R-groin safe guard in place, no hematoma or ecchymosis  2 + dorsalis pedis pulses b/l   Skin:  warm/dry    DEVICES:   [] Restraints [] PIVs [] ET tube [] central line [] PICC [x] arterial line [] frederick [] NGT/OGT [] EVD [] LD [] NIMCO/HMV [] LiCOX [] ICP monitor [] Trach [] PEG [] Chest Tube [] other: HPI:  Ms. Son is a 74 y/o F with HTN, prediabetes and chronic back pain 2/2 spinal stenosis p/w worsening of chronic back pain.  Pt reports pain in lower back with R sided sciatica since early 2017.  She had imaging in 2017 which revealed lumbar spinal stenosis.  She had been prescribed gabapentin as well as percocet which she only needed sparingly.  Pt recently has developed worsening lower back pain with paresthesia radiating to L thigh.  Pain makes it difficult to walk, and she feels that her L leg is weaker.  Pt has been unable to lay flat, or stand 2/2 the pain and has spent most of her time sitting.  She has been taking percocet without relief.  She has chronic incontinence, but no new symptoms.  She has been constipated which she attributes to taking percocet more frequently.  She has not had foot numbness or saddle anesthesia.  She had imaging done 2 weeks ago including MRI which showed an infiltrative process at L2 vertebral body, lumbar spinal stenosis and RP soft tissue mass.  CT a/p showed signs suspicious of uterine ca with mets to bone and lung.  Pt was referred to an oncologist with appointment scheduled later this week, but due to increasing severity of pain, she was advised to come to the ED by her PCP.         s/p  spinal angiography/emboilzation of spinal tumor.     VITALS:  T(C): , Max: 37.1 (09-16-18 @ 21:32)  HR:  (46 - 72)  BP:  (119/71 - 162/70)  ABP:  (146/53 - 168/70)  RR:  (16 - 23)  SpO2:  (93% - 100%)  Wt(kg): --      09-16-18 @ 07:01  -  09-17-18 @ 07:00  --------------------------------------------------------  IN: 780 mL / OUT: 1150 mL / NET: -370 mL    09-17-18 @ 07:01  -  09-17-18 @ 16:19  --------------------------------------------------------  IN: 450 mL / OUT: 865 mL / NET: -415 mL      LABS:  Na: 136 (09-16 @ 06:25), 138 (09-15 @ 14:08)  K: 4.2 (09-16 @ 06:25), 4.2 (09-15 @ 14:08)  Cl: 97 (09-16 @ 06:25), 100 (09-15 @ 14:08)  CO2: 28 (09-16 @ 06:25), 28 (09-15 @ 14:08)  BUN: 24 (09-16 @ 06:25), 21 (09-15 @ 14:08)  Cr: 0.98 (09-16 @ 06:25), 0.98 (09-15 @ 14:08)  Glu: 146(09-16 @ 06:25), 142(09-15 @ 14:08)    Hgb: 13.4 (09-16 @ 07:54), 13.0 (09-15 @ 15:26)  Hct: 42.4 (09-16 @ 07:54), 40.6 (09-15 @ 15:26)  WBC: 11.30 (09-16 @ 07:54), 11.80 (09-15 @ 15:26)  Plt: 331 (09-16 @ 07:54), 312 (09-15 @ 15:26)    INR: 1.09 09-16-18 @ 07:59  PTT: 30.8 09-16-18 @ 07:59    acetaminophen   Tablet .. 650 milliGRAM(s) Oral every 6 hours PRN  ceFAZolin   IVPB 1000 milliGRAM(s) IV Intermittent every 8 hours  dexamethasone  Injectable 4 milliGRAM(s) IV Push every 6 hours  dextrose 40% Gel 15 Gram(s) Oral once PRN  dextrose 5%. 1000 milliLiter(s) IV Continuous <Continuous>  dextrose 50% Injectable 12.5 Gram(s) IV Push once  dextrose 50% Injectable 25 Gram(s) IV Push once  dextrose 50% Injectable 25 Gram(s) IV Push once  diphenhydrAMINE   Capsule 25 milliGRAM(s) Oral every 24 hours PRN  docusate sodium 100 milliGRAM(s) Oral three times a day  glucagon  Injectable 1 milliGRAM(s) IntraMuscular once PRN  HYDROmorphone   Tablet 2 milliGRAM(s) Oral every 4 hours PRN  HYDROmorphone   Tablet 4 milliGRAM(s) Oral every 4 hours PRN  HYDROmorphone  Injectable 1 milliGRAM(s) IV Push every 3 hours PRN  influenza   Vaccine 0.5 milliLiter(s) IntraMuscular once  insulin lispro (HumaLOG) corrective regimen sliding scale   SubCutaneous three times a day before meals  insulin lispro (HumaLOG) corrective regimen sliding scale   SubCutaneous at bedtime  lactulose Syrup 20 Gram(s) Oral every 6 hours  losartan 50 milliGRAM(s) Oral daily  ondansetron Injectable 4 milliGRAM(s) IV Push every 6 hours PRN  senna 2 Tablet(s) Oral at bedtime PRN  sodium chloride 0.9% with potassium chloride 20 mEq/L 1000 milliLiter(s) IV Continuous <Continuous>      IMAGING:   Recent imaging studies were reviewed.    EXAMINATION:  General:  calm  HEENT:  MMM  Neuro:  awake, alert, oriented x 3, follows commands,  Pupils Equal and reactive, RUE 5/5,  LUE 5/5,  RLE 5/5,  LLE 5/5, no drift   Cards:  s1, s2 RRR  Respiratory:  lungs clear b/l   Adomen:  soft  Extremities:  no edema, R-groin safe guard in place, no hematoma or ecchymosis  2 + dorsalis pedis pulses b/l   Skin:  warm/dry    DEVICES:   [] Restraints [] PIVs [] ET tube [] central line [] PICC [x] arterial line [] frederick [] NGT/OGT [] EVD [] LD [] NIMCO/HMV [] LiCOX [] ICP monitor [] Trach [] PEG [] Chest Tube [] other:

## 2018-09-18 ENCOUNTER — RESULT REVIEW (OUTPATIENT)
Age: 73
End: 2018-09-18

## 2018-09-18 ENCOUNTER — APPOINTMENT (OUTPATIENT)
Dept: SPINE | Facility: HOSPITAL | Age: 73
End: 2018-09-18
Payer: MEDICARE

## 2018-09-18 PROBLEM — Z00.00 ENCOUNTER FOR PREVENTIVE HEALTH EXAMINATION: Status: ACTIVE | Noted: 2018-09-18

## 2018-09-18 LAB
ANION GAP SERPL CALC-SCNC: 16 MMOL/L — SIGNIFICANT CHANGE UP (ref 5–17)
ANION GAP SERPL CALC-SCNC: 9 MMOL/L — SIGNIFICANT CHANGE UP (ref 5–17)
BASOPHILS # BLD AUTO: 0 K/UL — SIGNIFICANT CHANGE UP (ref 0–0.2)
BLD GP AB SCN SERPL QL: NEGATIVE — SIGNIFICANT CHANGE UP
BUN SERPL-MCNC: 25 MG/DL — HIGH (ref 7–23)
BUN SERPL-MCNC: 27 MG/DL — HIGH (ref 7–23)
CALCIUM SERPL-MCNC: 8.9 MG/DL — SIGNIFICANT CHANGE UP (ref 8.4–10.5)
CALCIUM SERPL-MCNC: 9.7 MG/DL — SIGNIFICANT CHANGE UP (ref 8.4–10.5)
CHLORIDE SERPL-SCNC: 102 MMOL/L — SIGNIFICANT CHANGE UP (ref 96–108)
CHLORIDE SERPL-SCNC: 103 MMOL/L — SIGNIFICANT CHANGE UP (ref 96–108)
CO2 SERPL-SCNC: 20 MMOL/L — LOW (ref 22–31)
CO2 SERPL-SCNC: 23 MMOL/L — SIGNIFICANT CHANGE UP (ref 22–31)
CREAT SERPL-MCNC: 0.83 MG/DL — SIGNIFICANT CHANGE UP (ref 0.5–1.3)
CREAT SERPL-MCNC: 0.9 MG/DL — SIGNIFICANT CHANGE UP (ref 0.5–1.3)
EOSINOPHIL # BLD AUTO: 0 K/UL — SIGNIFICANT CHANGE UP (ref 0–0.5)
GLUCOSE BLDC GLUCOMTR-MCNC: 138 MG/DL — HIGH (ref 70–99)
GLUCOSE BLDC GLUCOMTR-MCNC: 139 MG/DL — HIGH (ref 70–99)
GLUCOSE BLDC GLUCOMTR-MCNC: 164 MG/DL — HIGH (ref 70–99)
GLUCOSE SERPL-MCNC: 155 MG/DL — HIGH (ref 70–99)
GLUCOSE SERPL-MCNC: 163 MG/DL — HIGH (ref 70–99)
HCT VFR BLD CALC: 34.6 % — SIGNIFICANT CHANGE UP (ref 34.5–45)
HCT VFR BLD CALC: 39.5 % — SIGNIFICANT CHANGE UP (ref 34.5–45)
HGB BLD-MCNC: 11.3 G/DL — LOW (ref 11.5–15.5)
HGB BLD-MCNC: 13.2 G/DL — SIGNIFICANT CHANGE UP (ref 11.5–15.5)
LYMPHOCYTES # BLD AUTO: 0.5 K/UL — LOW (ref 1–3.3)
LYMPHOCYTES # BLD AUTO: 1 % — LOW (ref 13–44)
MAGNESIUM SERPL-MCNC: 2.2 MG/DL — SIGNIFICANT CHANGE UP (ref 1.6–2.6)
MCHC RBC-ENTMCNC: 28.5 PG — SIGNIFICANT CHANGE UP (ref 27–34)
MCHC RBC-ENTMCNC: 29 PG — SIGNIFICANT CHANGE UP (ref 27–34)
MCHC RBC-ENTMCNC: 32.7 GM/DL — SIGNIFICANT CHANGE UP (ref 32–36)
MCHC RBC-ENTMCNC: 33.4 GM/DL — SIGNIFICANT CHANGE UP (ref 32–36)
MCV RBC AUTO: 87 FL — SIGNIFICANT CHANGE UP (ref 80–100)
MCV RBC AUTO: 87.2 FL — SIGNIFICANT CHANGE UP (ref 80–100)
MONOCYTES # BLD AUTO: 2.2 K/UL — HIGH (ref 0–0.9)
MONOCYTES NFR BLD AUTO: 10 % — SIGNIFICANT CHANGE UP (ref 2–14)
NEUTROPHILS # BLD AUTO: 24.3 K/UL — HIGH (ref 1.8–7.4)
NEUTROPHILS NFR BLD AUTO: 89 % — HIGH (ref 43–77)
PHOSPHATE SERPL-MCNC: 3.2 MG/DL — SIGNIFICANT CHANGE UP (ref 2.5–4.5)
PLATELET # BLD AUTO: 226 K/UL — SIGNIFICANT CHANGE UP (ref 150–400)
PLATELET # BLD AUTO: 311 K/UL — SIGNIFICANT CHANGE UP (ref 150–400)
POTASSIUM SERPL-MCNC: 4.2 MMOL/L — SIGNIFICANT CHANGE UP (ref 3.5–5.3)
POTASSIUM SERPL-MCNC: 5.3 MMOL/L — SIGNIFICANT CHANGE UP (ref 3.5–5.3)
POTASSIUM SERPL-SCNC: 4.2 MMOL/L — SIGNIFICANT CHANGE UP (ref 3.5–5.3)
POTASSIUM SERPL-SCNC: 5.3 MMOL/L — SIGNIFICANT CHANGE UP (ref 3.5–5.3)
RBC # BLD: 3.97 M/UL — SIGNIFICANT CHANGE UP (ref 3.8–5.2)
RBC # BLD: 4.54 M/UL — SIGNIFICANT CHANGE UP (ref 3.8–5.2)
RBC # FLD: 14.1 % — SIGNIFICANT CHANGE UP (ref 10.3–14.5)
RBC # FLD: 14.4 % — SIGNIFICANT CHANGE UP (ref 10.3–14.5)
RH IG SCN BLD-IMP: POSITIVE — SIGNIFICANT CHANGE UP
SODIUM SERPL-SCNC: 135 MMOL/L — SIGNIFICANT CHANGE UP (ref 135–145)
SODIUM SERPL-SCNC: 138 MMOL/L — SIGNIFICANT CHANGE UP (ref 135–145)
WBC # BLD: 14.4 K/UL — HIGH (ref 3.8–10.5)
WBC # BLD: 27.1 K/UL — HIGH (ref 3.8–10.5)
WBC # FLD AUTO: 14.4 K/UL — HIGH (ref 3.8–10.5)
WBC # FLD AUTO: 27.1 K/UL — HIGH (ref 3.8–10.5)

## 2018-09-18 PROCEDURE — 71045 X-RAY EXAM CHEST 1 VIEW: CPT | Mod: 26

## 2018-09-18 PROCEDURE — 15734 MUSCLE-SKIN GRAFT TRUNK: CPT

## 2018-09-18 PROCEDURE — 88341 IMHCHEM/IMCYTCHM EA ADD ANTB: CPT | Mod: 26

## 2018-09-18 PROCEDURE — 22610 ARTHRD PST TQ 1NTRSPC THRC: CPT

## 2018-09-18 PROCEDURE — 88342 IMHCHEM/IMCYTCHM 1ST ANTB: CPT | Mod: 26

## 2018-09-18 PROCEDURE — 22854 INSJ BIOMECHANICAL DEVICE: CPT

## 2018-09-18 PROCEDURE — 22558 ARTHRD ANT NTRBD MIN DSC LUM: CPT

## 2018-09-18 PROCEDURE — 22842 INSERT SPINE FIXATION DEVICE: CPT

## 2018-09-18 PROCEDURE — 88311 DECALCIFY TISSUE: CPT | Mod: 26

## 2018-09-18 PROCEDURE — 13102 CMPLX RPR TRUNK ADDL 5CM/<: CPT | Mod: 59

## 2018-09-18 PROCEDURE — 13101 CMPLX RPR TRUNK 2.6-7.5 CM: CPT | Mod: 59

## 2018-09-18 PROCEDURE — 88307 TISSUE EXAM BY PATHOLOGIST: CPT | Mod: 26

## 2018-09-18 PROCEDURE — 22585 ARTHRD ANT NTRBD MIN DSC EA: CPT

## 2018-09-18 PROCEDURE — 22614 ARTHRD PST TQ 1NTRSPC EA ADD: CPT

## 2018-09-18 PROCEDURE — 63102 REMOVE VERT BODY DCMPRN LMBR: CPT

## 2018-09-18 RX ORDER — CYCLOBENZAPRINE HYDROCHLORIDE 10 MG/1
5 TABLET, FILM COATED ORAL THREE TIMES A DAY
Qty: 0 | Refills: 0 | Status: DISCONTINUED | OUTPATIENT
Start: 2018-09-18 | End: 2018-09-19

## 2018-09-18 RX ORDER — NALOXONE HYDROCHLORIDE 4 MG/.1ML
0.1 SPRAY NASAL
Qty: 0 | Refills: 0 | Status: DISCONTINUED | OUTPATIENT
Start: 2018-09-18 | End: 2018-10-03

## 2018-09-18 RX ORDER — DEXTROSE 50 % IN WATER 50 %
12.5 SYRINGE (ML) INTRAVENOUS ONCE
Qty: 0 | Refills: 0 | Status: DISCONTINUED | OUTPATIENT
Start: 2018-09-18 | End: 2018-10-03

## 2018-09-18 RX ORDER — HYDROMORPHONE HYDROCHLORIDE 2 MG/ML
30 INJECTION INTRAMUSCULAR; INTRAVENOUS; SUBCUTANEOUS
Qty: 0 | Refills: 0 | Status: DISCONTINUED | OUTPATIENT
Start: 2018-09-18 | End: 2018-09-19

## 2018-09-18 RX ORDER — LOSARTAN POTASSIUM 100 MG/1
50 TABLET, FILM COATED ORAL DAILY
Qty: 0 | Refills: 0 | Status: DISCONTINUED | OUTPATIENT
Start: 2018-09-18 | End: 2018-10-03

## 2018-09-18 RX ORDER — DEXTROSE 50 % IN WATER 50 %
25 SYRINGE (ML) INTRAVENOUS ONCE
Qty: 0 | Refills: 0 | Status: DISCONTINUED | OUTPATIENT
Start: 2018-09-18 | End: 2018-10-03

## 2018-09-18 RX ORDER — ONDANSETRON 8 MG/1
4 TABLET, FILM COATED ORAL ONCE
Qty: 0 | Refills: 0 | Status: DISCONTINUED | OUTPATIENT
Start: 2018-09-18 | End: 2018-09-19

## 2018-09-18 RX ORDER — DEXTROSE 50 % IN WATER 50 %
15 SYRINGE (ML) INTRAVENOUS ONCE
Qty: 0 | Refills: 0 | Status: DISCONTINUED | OUTPATIENT
Start: 2018-09-18 | End: 2018-10-03

## 2018-09-18 RX ORDER — INSULIN LISPRO 100/ML
VIAL (ML) SUBCUTANEOUS
Qty: 0 | Refills: 0 | Status: DISCONTINUED | OUTPATIENT
Start: 2018-09-18 | End: 2018-09-27

## 2018-09-18 RX ORDER — ONDANSETRON 8 MG/1
4 TABLET, FILM COATED ORAL EVERY 6 HOURS
Qty: 0 | Refills: 0 | Status: DISCONTINUED | OUTPATIENT
Start: 2018-09-18 | End: 2018-09-18

## 2018-09-18 RX ORDER — DOCUSATE SODIUM 100 MG
100 CAPSULE ORAL THREE TIMES A DAY
Qty: 0 | Refills: 0 | Status: DISCONTINUED | OUTPATIENT
Start: 2018-09-18 | End: 2018-09-26

## 2018-09-18 RX ORDER — CEFAZOLIN SODIUM 1 G
2000 VIAL (EA) INJECTION EVERY 8 HOURS
Qty: 0 | Refills: 0 | Status: COMPLETED | OUTPATIENT
Start: 2018-09-18 | End: 2018-09-19

## 2018-09-18 RX ORDER — DEXAMETHASONE 0.5 MG/5ML
4 ELIXIR ORAL EVERY 6 HOURS
Qty: 0 | Refills: 0 | Status: DISCONTINUED | OUTPATIENT
Start: 2018-09-18 | End: 2018-09-21

## 2018-09-18 RX ORDER — LACTULOSE 10 G/15ML
20 SOLUTION ORAL EVERY 6 HOURS
Qty: 0 | Refills: 0 | Status: DISCONTINUED | OUTPATIENT
Start: 2018-09-18 | End: 2018-09-26

## 2018-09-18 RX ORDER — GLUCAGON INJECTION, SOLUTION 0.5 MG/.1ML
1 INJECTION, SOLUTION SUBCUTANEOUS ONCE
Qty: 0 | Refills: 0 | Status: DISCONTINUED | OUTPATIENT
Start: 2018-09-18 | End: 2018-10-03

## 2018-09-18 RX ORDER — HYDROMORPHONE HYDROCHLORIDE 2 MG/ML
0.5 INJECTION INTRAMUSCULAR; INTRAVENOUS; SUBCUTANEOUS
Qty: 0 | Refills: 0 | Status: DISCONTINUED | OUTPATIENT
Start: 2018-09-18 | End: 2018-09-19

## 2018-09-18 RX ORDER — DOCUSATE SODIUM 100 MG
100 CAPSULE ORAL THREE TIMES A DAY
Qty: 0 | Refills: 0 | Status: DISCONTINUED | OUTPATIENT
Start: 2018-09-18 | End: 2018-09-18

## 2018-09-18 RX ORDER — INSULIN LISPRO 100/ML
VIAL (ML) SUBCUTANEOUS AT BEDTIME
Qty: 0 | Refills: 0 | Status: DISCONTINUED | OUTPATIENT
Start: 2018-09-18 | End: 2018-09-27

## 2018-09-18 RX ORDER — ACETAMINOPHEN 500 MG
1000 TABLET ORAL ONCE
Qty: 0 | Refills: 0 | Status: COMPLETED | OUTPATIENT
Start: 2018-09-18 | End: 2018-09-19

## 2018-09-18 RX ORDER — ACETAMINOPHEN 500 MG
650 TABLET ORAL EVERY 6 HOURS
Qty: 0 | Refills: 0 | Status: DISCONTINUED | OUTPATIENT
Start: 2018-09-18 | End: 2018-10-03

## 2018-09-18 RX ORDER — SENNA PLUS 8.6 MG/1
2 TABLET ORAL AT BEDTIME
Qty: 0 | Refills: 0 | Status: DISCONTINUED | OUTPATIENT
Start: 2018-09-18 | End: 2018-09-26

## 2018-09-18 RX ORDER — DEXTROSE MONOHYDRATE, SODIUM CHLORIDE, AND POTASSIUM CHLORIDE 50; .745; 4.5 G/1000ML; G/1000ML; G/1000ML
1000 INJECTION, SOLUTION INTRAVENOUS
Qty: 0 | Refills: 0 | Status: DISCONTINUED | OUTPATIENT
Start: 2018-09-18 | End: 2018-09-20

## 2018-09-18 RX ORDER — DIPHENHYDRAMINE HCL 50 MG
25 CAPSULE ORAL EVERY 24 HOURS
Qty: 0 | Refills: 0 | Status: DISCONTINUED | OUTPATIENT
Start: 2018-09-18 | End: 2018-10-03

## 2018-09-18 RX ORDER — ONDANSETRON 8 MG/1
4 TABLET, FILM COATED ORAL EVERY 6 HOURS
Qty: 0 | Refills: 0 | Status: DISCONTINUED | OUTPATIENT
Start: 2018-09-18 | End: 2018-10-03

## 2018-09-18 RX ORDER — ENOXAPARIN SODIUM 100 MG/ML
40 INJECTION SUBCUTANEOUS AT BEDTIME
Qty: 0 | Refills: 0 | Status: DISCONTINUED | OUTPATIENT
Start: 2018-09-19 | End: 2018-09-22

## 2018-09-18 RX ADMIN — HYDROMORPHONE HYDROCHLORIDE 1 MILLIGRAM(S): 2 INJECTION INTRAMUSCULAR; INTRAVENOUS; SUBCUTANEOUS at 10:00

## 2018-09-18 RX ADMIN — CYCLOBENZAPRINE HYDROCHLORIDE 5 MILLIGRAM(S): 10 TABLET, FILM COATED ORAL at 23:27

## 2018-09-18 RX ADMIN — HYDROMORPHONE HYDROCHLORIDE 1 MILLIGRAM(S): 2 INJECTION INTRAMUSCULAR; INTRAVENOUS; SUBCUTANEOUS at 09:40

## 2018-09-18 RX ADMIN — HYDROMORPHONE HYDROCHLORIDE 1 MILLIGRAM(S): 2 INJECTION INTRAMUSCULAR; INTRAVENOUS; SUBCUTANEOUS at 13:23

## 2018-09-18 RX ADMIN — Medication 4 MILLIGRAM(S): at 06:35

## 2018-09-18 RX ADMIN — HYDROMORPHONE HYDROCHLORIDE 30 MILLILITER(S): 2 INJECTION INTRAMUSCULAR; INTRAVENOUS; SUBCUTANEOUS at 22:13

## 2018-09-18 RX ADMIN — Medication 4 MILLIGRAM(S): at 11:45

## 2018-09-18 RX ADMIN — HYDROMORPHONE HYDROCHLORIDE 1 MILLIGRAM(S): 2 INJECTION INTRAMUSCULAR; INTRAVENOUS; SUBCUTANEOUS at 04:43

## 2018-09-18 RX ADMIN — HYDROMORPHONE HYDROCHLORIDE 0.5 MILLIGRAM(S): 2 INJECTION INTRAMUSCULAR; INTRAVENOUS; SUBCUTANEOUS at 22:10

## 2018-09-18 RX ADMIN — Medication 100 MILLIGRAM(S): at 22:48

## 2018-09-18 RX ADMIN — Medication 1: at 21:56

## 2018-09-18 RX ADMIN — HYDROMORPHONE HYDROCHLORIDE 30 MILLILITER(S): 2 INJECTION INTRAMUSCULAR; INTRAVENOUS; SUBCUTANEOUS at 21:27

## 2018-09-18 RX ADMIN — DEXTROSE MONOHYDRATE, SODIUM CHLORIDE, AND POTASSIUM CHLORIDE 100 MILLILITER(S): 50; .745; 4.5 INJECTION, SOLUTION INTRAVENOUS at 11:22

## 2018-09-18 RX ADMIN — Medication 100 MILLIGRAM(S): at 06:34

## 2018-09-18 RX ADMIN — HYDROMORPHONE HYDROCHLORIDE 1 MILLIGRAM(S): 2 INJECTION INTRAMUSCULAR; INTRAVENOUS; SUBCUTANEOUS at 13:43

## 2018-09-18 NOTE — PROGRESS NOTE ADULT - SUBJECTIVE AND OBJECTIVE BOX
HPI:  Ms. Son is a 72 y/o F with HTN, prediabetes and chronic back pain 2/2 spinal stenosis p/w worsening of chronic back pain.  Pt reports pain in lower back with R sided sciatica since early 2017.  She had imaging in 2017 which revealed lumbar spinal stenosis.  She had been prescribed gabapentin as well as percocet which she only needed sparingly.  Pt recently has developed worsening lower back pain with paresthesia radiating to L thigh.  Pain makes it difficult to walk, and she feels that her L leg is weaker.  Pt has been unable to lay flat, or stand 2/2 the pain and has spent most of her time sitting.  She has been taking percocet without relief.  She has chronic incontinence, but no new symptoms.  She has been constipated which she attributes to taking percocet more frequently.  She has not had foot numbness or saddle anesthesia.  She had imaging done 2 weeks ago including MRI which showed an infiltrative process at L2 vertebral body, lumbar spinal stenosis and RP soft tissue mass.  CT a/p showed signs suspicious of uterine ca with mets to bone and lung.  Pt was referred to an oncologist with appointment scheduled later this week, but due to increasing severity of pain, she was advised to come to the ED by her PCP.         s/p  spinal angiography/emboilzation of spinal tumor.     VITALS:  T(C): , Max: 36.4 (09-17-18 @ 20:30)  HR:  (46 - 76)  BP:  (132/58 - 162/70)  ABP:  (107/78 - 168/70)  RR:  (16 - 23)  SpO2:  (96% - 100%)  Wt(kg): --      09-16-18 @ 07:01  -  09-17-18 @ 07:00  --------------------------------------------------------  IN: 780 mL / OUT: 1150 mL / NET: -370 mL    09-17-18 @ 07:01  -  09-18-18 @ 06:47  --------------------------------------------------------  IN: 2540 mL / OUT: 2285 mL / NET: 255 mL      LABS:  Na: 135 (09-18 @ 05:19), 136 (09-17 @ 20:51), 136 (09-16 @ 06:25), 138 (09-15 @ 14:08)  K: 5.3 (09-18 @ 05:19), 4.5 (09-17 @ 20:51), 4.2 (09-16 @ 06:25), 4.2 (09-15 @ 14:08)  Cl: 103 (09-18 @ 05:19), 102 (09-17 @ 20:51), 97 (09-16 @ 06:25), 100 (09-15 @ 14:08)  CO2: 23 (09-18 @ 05:19), 23 (09-17 @ 20:51), 28 (09-16 @ 06:25), 28 (09-15 @ 14:08)  BUN: 25 (09-18 @ 05:19), 27 (09-17 @ 20:51), 24 (09-16 @ 06:25), 21 (09-15 @ 14:08)  Cr: 0.83 (09-18 @ 05:19), 0.86 (09-17 @ 20:51), 0.98 (09-16 @ 06:25), 0.98 (09-15 @ 14:08)  Glu: 155(09-18 @ 05:19), 188(09-17 @ 20:51), 146(09-16 @ 06:25), 142(09-15 @ 14:08)    Hgb: 13.2 (09-18 @ 05:19), 13.3 (09-17 @ 20:51), 13.4 (09-16 @ 07:54), 13.0 (09-15 @ 15:26)  Hct: 39.5 (09-18 @ 05:19), 38.5 (09-17 @ 20:51), 42.4 (09-16 @ 07:54), 40.6 (09-15 @ 15:26)  WBC: 14.4 (09-18 @ 05:19), 15.1 (09-17 @ 20:51), 11.30 (09-16 @ 07:54), 11.80 (09-15 @ 15:26)  Plt: 311 (09-18 @ 05:19), 283 (09-17 @ 20:51), 331 (09-16 @ 07:54), 312 (09-15 @ 15:26)    INR: 1.09 09-16-18 @ 07:59  PTT: 30.8 09-16-18 @ 07:59    acetaminophen   Tablet .. 650 milliGRAM(s) Oral every 6 hours PRN  ceFAZolin   IVPB 1000 milliGRAM(s) IV Intermittent every 8 hours  dexamethasone  Injectable 4 milliGRAM(s) IV Push every 6 hours  dextrose 40% Gel 15 Gram(s) Oral once PRN  dextrose 5%. 1000 milliLiter(s) IV Continuous <Continuous>  dextrose 50% Injectable 12.5 Gram(s) IV Push once  dextrose 50% Injectable 25 Gram(s) IV Push once  dextrose 50% Injectable 25 Gram(s) IV Push once  diphenhydrAMINE   Capsule 25 milliGRAM(s) Oral every 24 hours PRN  docusate sodium 100 milliGRAM(s) Oral three times a day  glucagon  Injectable 1 milliGRAM(s) IntraMuscular once PRN  HYDROmorphone   Tablet 2 milliGRAM(s) Oral every 4 hours PRN  HYDROmorphone   Tablet 4 milliGRAM(s) Oral every 4 hours PRN  HYDROmorphone  Injectable 1 milliGRAM(s) IV Push every 3 hours PRN  influenza   Vaccine 0.5 milliLiter(s) IntraMuscular once  insulin lispro (HumaLOG) corrective regimen sliding scale   SubCutaneous three times a day before meals  insulin lispro (HumaLOG) corrective regimen sliding scale   SubCutaneous at bedtime  lactulose Syrup 20 Gram(s) Oral every 6 hours  losartan 50 milliGRAM(s) Oral daily  ondansetron Injectable 4 milliGRAM(s) IV Push every 6 hours PRN  senna 2 Tablet(s) Oral at bedtime PRN  sodium chloride 0.9% with potassium chloride 20 mEq/L 1000 milliLiter(s) IV Continuous <Continuous>            IMAGING:   Recent imaging studies were reviewed.    EXAMINATION:  General:  calm  HEENT:  MMM  Neuro:  awake, alert, oriented x 3, follows commands,  Pupils Equal and reactive, RUE 5/5,  LUE 5/5,  RLE 5/5,  LLE 5/5, no drift   Cards:  s1, s2 RRR  Respiratory:  lungs clear b/l   Adomen:  soft  Extremities:  no edema, R-groin safe guard in place, no hematoma or ecchymosis  2 + dorsalis pedis pulses b/l   Skin:  warm/dry    DEVICES:   [] Restraints [] PIVs [] ET tube [] central line [] PICC [x] arterial line [] frederick [] NGT/OGT [] EVD [] LD [] NIMCO/HMV [] LiCOX [] ICP monitor [] Trach [] PEG [] Chest Tube [] other: HPI:  Ms. Son is a 72 y/o F with HTN, prediabetes and chronic back pain 2/2 spinal stenosis p/w worsening of chronic back pain.  Pt reports pain in lower back with R sided sciatica since early 2017.  She had imaging in 2017 which revealed lumbar spinal stenosis.  She had been prescribed gabapentin as well as percocet which she only needed sparingly.  Pt recently has developed worsening lower back pain with paresthesia radiating to L thigh.  Pain makes it difficult to walk, and she feels that her L leg is weaker.  Pt has been unable to lay flat, or stand 2/2 the pain and has spent most of her time sitting.  She has been taking percocet without relief.  She has chronic incontinence, but no new symptoms.  She has been constipated which she attributes to taking percocet more frequently.  She has not had foot numbness or saddle anesthesia.  She had imaging done 2 weeks ago including MRI which showed an infiltrative process at L2 vertebral body, lumbar spinal stenosis and RP soft tissue mass.  CT a/p showed signs suspicious of uterine ca with mets to bone and lung.  Pt was referred to an oncologist with appointment scheduled later this week, but due to increasing severity of pain, she was advised to come to the ED by her PCP.         s/p  spinal angiography/emboilzation of spinal tumor.   9/18: Preop for resection of spinal tumor     VITALS:  T(C): , Max: 36.4 (09-17-18 @ 20:30)  HR:  (46 - 76)  BP:  (132/58 - 162/70)  ABP:  (107/78 - 168/70)  RR:  (16 - 23)  SpO2:  (96% - 100%)  Wt(kg): --      09-16-18 @ 07:01  -  09-17-18 @ 07:00  --------------------------------------------------------  IN: 780 mL / OUT: 1150 mL / NET: -370 mL    09-17-18 @ 07:01  -  09-18-18 @ 06:47  --------------------------------------------------------  IN: 2540 mL / OUT: 2285 mL / NET: 255 mL      LABS:  Na: 135 (09-18 @ 05:19), 136 (09-17 @ 20:51), 136 (09-16 @ 06:25), 138 (09-15 @ 14:08)  K: 5.3 (09-18 @ 05:19), 4.5 (09-17 @ 20:51), 4.2 (09-16 @ 06:25), 4.2 (09-15 @ 14:08)  Cl: 103 (09-18 @ 05:19), 102 (09-17 @ 20:51), 97 (09-16 @ 06:25), 100 (09-15 @ 14:08)  CO2: 23 (09-18 @ 05:19), 23 (09-17 @ 20:51), 28 (09-16 @ 06:25), 28 (09-15 @ 14:08)  BUN: 25 (09-18 @ 05:19), 27 (09-17 @ 20:51), 24 (09-16 @ 06:25), 21 (09-15 @ 14:08)  Cr: 0.83 (09-18 @ 05:19), 0.86 (09-17 @ 20:51), 0.98 (09-16 @ 06:25), 0.98 (09-15 @ 14:08)  Glu: 155(09-18 @ 05:19), 188(09-17 @ 20:51), 146(09-16 @ 06:25), 142(09-15 @ 14:08)    Hgb: 13.2 (09-18 @ 05:19), 13.3 (09-17 @ 20:51), 13.4 (09-16 @ 07:54), 13.0 (09-15 @ 15:26)  Hct: 39.5 (09-18 @ 05:19), 38.5 (09-17 @ 20:51), 42.4 (09-16 @ 07:54), 40.6 (09-15 @ 15:26)  WBC: 14.4 (09-18 @ 05:19), 15.1 (09-17 @ 20:51), 11.30 (09-16 @ 07:54), 11.80 (09-15 @ 15:26)  Plt: 311 (09-18 @ 05:19), 283 (09-17 @ 20:51), 331 (09-16 @ 07:54), 312 (09-15 @ 15:26)    INR: 1.09 09-16-18 @ 07:59  PTT: 30.8 09-16-18 @ 07:59    acetaminophen   Tablet .. 650 milliGRAM(s) Oral every 6 hours PRN  ceFAZolin   IVPB 1000 milliGRAM(s) IV Intermittent every 8 hours  dexamethasone  Injectable 4 milliGRAM(s) IV Push every 6 hours  dextrose 40% Gel 15 Gram(s) Oral once PRN  dextrose 5%. 1000 milliLiter(s) IV Continuous <Continuous>  dextrose 50% Injectable 12.5 Gram(s) IV Push once  dextrose 50% Injectable 25 Gram(s) IV Push once  dextrose 50% Injectable 25 Gram(s) IV Push once  diphenhydrAMINE   Capsule 25 milliGRAM(s) Oral every 24 hours PRN  docusate sodium 100 milliGRAM(s) Oral three times a day  glucagon  Injectable 1 milliGRAM(s) IntraMuscular once PRN  HYDROmorphone   Tablet 2 milliGRAM(s) Oral every 4 hours PRN  HYDROmorphone   Tablet 4 milliGRAM(s) Oral every 4 hours PRN  HYDROmorphone  Injectable 1 milliGRAM(s) IV Push every 3 hours PRN  influenza   Vaccine 0.5 milliLiter(s) IntraMuscular once  insulin lispro (HumaLOG) corrective regimen sliding scale   SubCutaneous three times a day before meals  insulin lispro (HumaLOG) corrective regimen sliding scale   SubCutaneous at bedtime  lactulose Syrup 20 Gram(s) Oral every 6 hours  losartan 50 milliGRAM(s) Oral daily  ondansetron Injectable 4 milliGRAM(s) IV Push every 6 hours PRN  senna 2 Tablet(s) Oral at bedtime PRN  sodium chloride 0.9% with potassium chloride 20 mEq/L 1000 milliLiter(s) IV Continuous <Continuous>            IMAGING:   Recent imaging studies were reviewed.    EXAMINATION:  General:  calm  HEENT:  MMM  Neuro:  awake, alert, oriented x 3, follows commands,  Pupils Equal and reactive, RUE 5/5,  LUE 5/5,  RLE 5/5,  LLE 5/5, no drift   Cards:  s1, s2 RRR  Respiratory:  lungs clear b/l   Adomen:  soft  Extremities:  no edema, R-groin safe guard in place, no hematoma or ecchymosis  2 + dorsalis pedis pulses b/l   Skin:  warm/dry    DEVICES:   [] Restraints [] PIVs [] ET tube [] central line [] PICC [x] arterial line [] frederick [] NGT/OGT [] EVD [] LD [] NIMCO/HMV [] LiCOX [] ICP monitor [] Trach [] PEG [] Chest Tube [] other: Ms. Son is a 74 y/o F with HTN, prediabetes and chronic back pain 2/2 spinal stenosis p/w worsening of chronic back pain.  Pt reports pain in lower back with R sided sciatica since early 2017.  She had imaging in 2017 which revealed lumbar spinal stenosis.  She had been prescribed gabapentin as well as percocet which she only needed sparingly.  Pt recently has developed worsening lower back pain with paresthesia radiating to L thigh.  Pain makes it difficult to walk, and she feels that her L leg is weaker.  Pt has been unable to lay flat, or stand 2/2 the pain and has spent most of her time sitting.  She has been taking percocet without relief.  She has chronic incontinence, but no new symptoms.  She has been constipated which she attributes to taking percocet more frequently.  She has not had foot numbness or saddle anesthesia.  She had imaging done 2 weeks ago including MRI which showed an infiltrative process at L2 vertebral body, lumbar spinal stenosis and RP soft tissue mass.  CT a/p showed signs suspicious of uterine ca with mets to bone and lung.  Pt was referred to an oncologist with appointment scheduled later this week, but due to increasing severity of pain, she was advised to come to the ED by her PCP.     At [facility] patient receieved CT guided biopsy. Tx to [facility] for further work up and management of spinal lesion (12 Sep 2018 19:54)  Underwent angio with embolization of the T2 lesion.      s/p  spinal angiography/emboilzation of spinal tumor.   9/18: Preop for resection of spinal tumor     T(C): 36.6 (09-18-18 @ 07:00), Max: 36.6 (09-18-18 @ 07:00)  HR: 53 (09-18-18 @ 13:35) (51 - 76)  BP: 159/65 (09-17-18 @ 17:00) (138/60 - 159/65)  RR: 16 (09-18-18 @ 13:35) (15 - 20)  SpO2: 97% (09-18-18 @ 13:35) (97% - 99%)  09-17-18 @ 07:01  -  09-18-18 @ 07:00  --------------------------------------------------------  IN: 2740 mL / OUT: 2510 mL / NET: 230 mL    09-18-18 @ 07:01  -  09-18-18 @ 16:29  --------------------------------------------------------  IN: 700 mL / OUT: 1560 mL / NET: -860 mL    influenza   Vaccine 0.5 milliLiter(s) IntraMuscular once        Allergies and Intolerances:        Allergies:  	No Known Allergies:     Home Medications:   * Patient Currently Takes Medications as of 12-Sep-2018 12:59 documented in Structured Notes  · 	senna oral tablet: 2 tab(s) orally once a day (at bedtime)  · 	polyethylene glycol 3350 oral powder for reconstitution: 17 gram(s) orally once a day  · 	lactulose 10 g/15 mL oral syrup: 30 milliliter(s) orally every 6 hours, until pt has bm   · 	docusate sodium 100 mg oral capsule: 1 cap(s) orally 3 times a day  · 	ceFAZolin 1 g intravenous injection: 1 gram(s) intravenous every 8 hours  · 	HYDROmorphone: 1 milligram(s) IV push every 4 hours, As Needed for Moderate and Severe Pain, hold for oversedation/lethargy or if RR <12    · 	Percocet 5/325 oral tablet: 1 tab(s) orally every 6 hours, As Needed, hold for oversedation/lethargy or if RR <12   · 	losartan 50 mg oral tablet: 1 tab(s) orally once a day, hold if SBP <110     .    Patient History:    Past Medical History:  Essential (primary) hypertension    Lumbar spinal stenosis    Prediabetes.     Past Surgical History:  S/P cholecystectomy    S/P dilation and curettage  Early 2000's for DUB, findings benign.  S/P right knee arthroscopy.     Family History:  Mother  Still living? Unknown  Family history of diabetes mellitus, Age at diagnosis: Age Unknown  Family history of acute myocardial infarction, Age at diagnosis: Age Unknown     Sibling  Still living? Unknown  Family history of diabetes mellitus, Age at diagnosis: Age Unknown.     Tobacco Screening:  · Core Measure Site	No    Risk Assessment:    Present on Admission:  Deep Venous Thrombosis	suspected  Pulmonary Embolus	suspected     Heart Failure:  Does this patient have a history of or has been diagnosed with heart failure? unknown.        REVIEW OF SYSTEMS: [ ] Unable to Assess due to neurologic exam   [ x] All ROS addressed below are non-contributory, except:  Neuro: [ ] Headache [ x] Back pain [ ] Numbness [ ] Weakness [ ] Ataxia [ ] Dizziness [ ] Aphasia [ ] Dysarthria [ ] Visual disturbance  Resp: [ ] Shortness of breath/dyspnea, [ ] Orthopnea [ ] Cough  CV: [ ] Chest pain [ ] Palpitation [ ] Lightheadedness [ ] Syncope  Renal: [ ] Thirst [ ] Edema  GI: [ ] Nausea [ ] Emesis [ ] Abdominal pain [ ] Constipation [ ] Diarrhea  Hem: [ ] Hematemesis [ ] bright red blood per rectum  ID: [ ] Fever [ ] Chills [ ] Dysuria  ENT: [ ] Rhinorrhea          LABS:  Na: 135 (09-18 @ 05:19), 136 (09-17 @ 20:51), 136 (09-16 @ 06:25), 138 (09-15 @ 14:08)  K: 5.3 (09-18 @ 05:19), 4.5 (09-17 @ 20:51), 4.2 (09-16 @ 06:25), 4.2 (09-15 @ 14:08)  Cl: 103 (09-18 @ 05:19), 102 (09-17 @ 20:51), 97 (09-16 @ 06:25), 100 (09-15 @ 14:08)  CO2: 23 (09-18 @ 05:19), 23 (09-17 @ 20:51), 28 (09-16 @ 06:25), 28 (09-15 @ 14:08)  BUN: 25 (09-18 @ 05:19), 27 (09-17 @ 20:51), 24 (09-16 @ 06:25), 21 (09-15 @ 14:08)  Cr: 0.83 (09-18 @ 05:19), 0.86 (09-17 @ 20:51), 0.98 (09-16 @ 06:25), 0.98 (09-15 @ 14:08)  Glu: 155(09-18 @ 05:19), 188(09-17 @ 20:51), 146(09-16 @ 06:25), 142(09-15 @ 14:08)    Hgb: 13.2 (09-18 @ 05:19), 13.3 (09-17 @ 20:51), 13.4 (09-16 @ 07:54), 13.0 (09-15 @ 15:26)  Hct: 39.5 (09-18 @ 05:19), 38.5 (09-17 @ 20:51), 42.4 (09-16 @ 07:54), 40.6 (09-15 @ 15:26)  WBC: 14.4 (09-18 @ 05:19), 15.1 (09-17 @ 20:51), 11.30 (09-16 @ 07:54), 11.80 (09-15 @ 15:26)  Plt: 311 (09-18 @ 05:19), 283 (09-17 @ 20:51), 331 (09-16 @ 07:54), 312 (09-15 @ 15:26)    INR: 1.09 09-16-18 @ 07:59  PTT: 30.8 09-16-18 @ 07:59    acetaminophen   Tablet .. 650 milliGRAM(s) Oral every 6 hours PRN  ceFAZolin   IVPB 1000 milliGRAM(s) IV Intermittent every 8 hours  dexamethasone  Injectable 4 milliGRAM(s) IV Push every 6 hours  dextrose 40% Gel 15 Gram(s) Oral once PRN  dextrose 5%. 1000 milliLiter(s) IV Continuous <Continuous>  dextrose 50% Injectable 12.5 Gram(s) IV Push once  dextrose 50% Injectable 25 Gram(s) IV Push once  dextrose 50% Injectable 25 Gram(s) IV Push once  diphenhydrAMINE   Capsule 25 milliGRAM(s) Oral every 24 hours PRN  docusate sodium 100 milliGRAM(s) Oral three times a day  glucagon  Injectable 1 milliGRAM(s) IntraMuscular once PRN  HYDROmorphone   Tablet 2 milliGRAM(s) Oral every 4 hours PRN  HYDROmorphone   Tablet 4 milliGRAM(s) Oral every 4 hours PRN  HYDROmorphone  Injectable 1 milliGRAM(s) IV Push every 3 hours PRN  influenza   Vaccine 0.5 milliLiter(s) IntraMuscular once  insulin lispro (HumaLOG) corrective regimen sliding scale   SubCutaneous three times a day before meals  insulin lispro (HumaLOG) corrective regimen sliding scale   SubCutaneous at bedtime  lactulose Syrup 20 Gram(s) Oral every 6 hours  losartan 50 milliGRAM(s) Oral daily  ondansetron Injectable 4 milliGRAM(s) IV Push every 6 hours PRN  senna 2 Tablet(s) Oral at bedtime PRN  sodium chloride 0.9% with potassium chloride 20 mEq/L 1000 milliLiter(s) IV Continuous <Continuous>      IMAGING:   Recent imaging studies were reviewed.    EXAMINATION:  General:  calm  HEENT:  MMM  Neuro:  awake, alert, oriented x 3, follows commands,  Pupils Equal and reactive, RUE 5/5,  LUE 5/5,  RLE 5/5,  LLE 5/5, no drift   Cards:  s1, s2 RRR  Respiratory:  lungs clear b/l   Adomen:  soft  Extremities:  no edema, R-groin safe guard in place, no hematoma or ecchymosis  2 + dorsalis pedis pulses b/l   Skin:  warm/dry    DEVICES:   [] Restraints [] PIVs [] ET tube [] central line [] PICC [x] arterial line [] frederick [] NGT/OGT [] EVD [] LD [] NIMCO/HMV [] LiCOX [] ICP monitor [] Trach [] PEG [] Chest Tube [] other:

## 2018-09-18 NOTE — PROGRESS NOTE ADULT - SUBJECTIVE AND OBJECTIVE BOX
Visit Summary: Patient seen and evaluated at bedside.    Overnight Events: none    Exam:  T(C): 36.4 (09-18-18 @ 00:00), Max: 36.4 (09-17-18 @ 05:04)  HR: 64 (09-18-18 @ 00:00) (46 - 76)  BP: 159/65 (09-17-18 @ 17:00) (132/58 - 162/70)  RR: 16 (09-18-18 @ 00:00) (16 - 23)  SpO2: 98% (09-18-18 @ 00:00) (96% - 100%)  Wt(kg): --    AOx3, FC, PERRL, EOMI, no facial   4/5 throughout 2/2 effort but for L HF 1/5, no drift  SILT  no clonus                          13.3   15.1  )-----------( 283      ( 17 Sep 2018 20:51 )             38.5     09-17    136  |  102  |  27<H>  ----------------------------<  188<H>  4.5   |  23  |  0.86    Ca    9.6      17 Sep 2018 20:51  Phos  3.2     09-17  Mg     2.1     09-17    PT/INR - ( 16 Sep 2018 07:59 )   PT: 12.3 sec;   INR: 1.09 ratio         PTT - ( 16 Sep 2018 07:59 )  PTT:30.8 sec

## 2018-09-18 NOTE — BRIEF OPERATIVE NOTE - PRE-OP DX
Metastasis to spinal column  09/18/2018    Active  Kelechi Fitzgerald
Metastasis to spinal column  09/18/2018    Active  Kelechi Fitzgerald

## 2018-09-18 NOTE — BRIEF OPERATIVE NOTE - PROCEDURE
<<-----Click on this checkbox to enter Procedure Muscle flap closure  09/18/2018  bilateraly paraspinous muscle flap and complex closure of back  Active  NOKELLY

## 2018-09-18 NOTE — PROGRESS NOTE ADULT - ASSESSMENT
73F newly diagnosed likely  tumor and CT Chest showing poss pulm mets with L2 bony / paraspinal lesion s/p biopsy now s/p angio embo L2 lesion today  - preop for OR tomorrow  - q1hr neurochecks

## 2018-09-18 NOTE — BRIEF OPERATIVE NOTE - PROCEDURE
<<-----Click on this checkbox to enter Procedure Corpectomy of single segment of lumbar spine for excision of intraspinal lesion  09/18/2018  L2 CORPECTOMY, T11-L4 FUSION  Active  [id number]

## 2018-09-18 NOTE — PROGRESS NOTE ADULT - ASSESSMENT
ASSESSMENT/PLAN:  s/p  spinal angiography/emboilzation of spinal tumor.     NEURO:    Neurochecks q1 hrs,  Decadron per Neurosurgery   Plan for OR resection of tumor tomorrow     Pain control , safe guard to be removed s/p 6 hrs   Activity: [] mobilize as tolerated [x] Bedrest [] PT [] OT [] PMNR    PULM:  O2 sat > 92%, incentive spirometry     CV:  keep A-line for OR tomorrow   HTN: c/w home meds of losartan   MAP > 65    RENAL:  Fluids:  NS @ 100    GI:    Diet: advance diet as tolerated - NPO at midnight   GI prophylaxis [x] not indicated [] PPI [] other:  Bowel regimen [x] colace [x] senna [] other:    ENDO:   Goal euglycemia (-180), ISS     HEME/ONC:  VTE prophylaxis: [] SCDs [] chemoprophylaxis [x] hold chemoprophylaxis due to: fresh post op [] high risk of DVT/PE on admission due to:    ID:  afebrile, periop antibiotics     SOCIAL/FAMILY:  [] awaiting [x] updated at bedside [] family meeting    CODE STATUS:  [x] Full Code [] DNR [] DNI [] Palliative/Comfort Care    DISPOSITION:  [x] ICU [] Stroke Unit [] Floor [] EMU [] RCU [] PCU    [x] Patient is at high risk of neurologic deterioration/death due to:  spinal hematoma    Time seen:  Time spent: __45_ [] critical care minutes    Contact: [phone] ASSESSMENT/PLAN:  s/p  spinal angiography/emboilzation of spinal tumor.     NEURO:    Neurochecks q1 hrs,  Decadron per Neurosurgery   Plan for OR resection of tumor today  Pain control , safe guard to be removed s/p 6 hrs   Activity: [] mobilize as tolerated [x] Bedrest [] PT [] OT [] PMNR    PULM:  O2 sat > 92%, incentive spirometry     CV:  keep A-line for OR tomorrow   HTN: c/w home meds of losartan   MAP > 65    RENAL:  Fluids:  NS @ 100    GI:    Diet: advance diet as tolerated - NPO at midnight   GI prophylaxis [x] not indicated [] PPI [] other:  Bowel regimen [x] colace [x] senna [] other:    ENDO:   Goal euglycemia (-180), ISS     HEME/ONC:  VTE prophylaxis: [] SCDs [] chemoprophylaxis [x] hold chemoprophylaxis due to: fresh post op [] high risk of DVT/PE on admission due to:    ID:  afebrile, periop antibiotics     SOCIAL/FAMILY:  [] awaiting [x] updated at bedside [] family meeting    CODE STATUS:  [x] Full Code [] DNR [] DNI [] Palliative/Comfort Care    DISPOSITION:  [x] ICU [] Stroke Unit [] Floor [] EMU [] RCU [] PCU    [x] Patient is at high risk of neurologic deterioration/death due to:  spinal hematoma    Time seen:  Time spent: __45_ [] critical care minutes    Contact: [phone] ASSESSMENT/PLAN:  s/p  spinal angiography/emboilzation of spinal tumor.     NEURO:    Neurochecks q1 hrs,  Decadron per Neurosurgery   Plan for OR resection of tumor today  Pain control , safe guard to be removed s/p 6 hrs   Activity: [] mobilize as tolerated [x] Bedrest [] PT [] OT [] PMNR    PULM:  O2 sat > 92%, incentive spirometry     CV:  keep A-line for OR tomorrow   HTN: c/w home meds of losartan   MAP > 65    RENAL:  Fluids:  NS @ 100    GI:    Diet: advance diet as tolerated - NPO at midnight   GI prophylaxis [x] not indicated [] PPI [] other:  Bowel regimen [x] colace [x] senna [] other:    ENDO:   Goal euglycemia (-180), ISS     HEME/ONC:  VTE prophylaxis: [] SCDs [] chemoprophylaxis [x] hold chemoprophylaxis due to: fresh post op [] high risk of DVT/PE on admission due to:    ID:  afebrile, periop antibiotics     SOCIAL/FAMILY:  [] awaiting [x] updated at bedside [] family meeting    CODE STATUS:  [x] Full Code [] DNR [] DNI [] Palliative/Comfort Care    DISPOSITION:  [x] ICU [] Stroke Unit [] Floor [] EMU [] RCU [] PCU    [x] Patient is at high risk of neurologic deterioration/death due to:  spinal hematoma    moderate complexity    Contact: [phone]

## 2018-09-18 NOTE — BRIEF OPERATIVE NOTE - OPERATION/FINDINGS
bilateral paraspinous muscle flap closure over hardware and complex closure of back wound
L2 CORPECTOMY, T11-L4 FUSION

## 2018-09-18 NOTE — PROGRESS NOTE ADULT - ASSESSMENT
ASSESSMENT/PLAN:  s/p  spinal angiography/emboilzation of spinal tumor.   Preop for surgery in AM.    -Neurochecks q1 hrs,    -Decadron per Neurosurgery   -Pain control , safe guard to be removed   -NPO for OR in AM  -SCDs, hold AC as going to OR in AM    Dispo: ICU/PACU overnight.    Critical care time: 35 min.

## 2018-09-18 NOTE — PROGRESS NOTE ADULT - SUBJECTIVE AND OBJECTIVE BOX
Ms. Son is a 74 y/o F with HTN, prediabetes and chronic back pain 2/2 spinal stenosis p/w worsening of chronic back pain.  Pt reports pain in lower back with R sided sciatica since early 2017.  She had imaging in 2017 which revealed lumbar spinal stenosis.  She had been prescribed gabapentin as well as percocet which she only needed sparingly.  Pt recently has developed worsening lower back pain with paresthesia radiating to L thigh.  Pain makes it difficult to walk, and she feels that her L leg is weaker.  Pt has been unable to lay flat, or stand 2/2 the pain and has spent most of her time sitting.  She has been taking percocet without relief.  She has chronic incontinence, but no new symptoms.  She has been constipated which she attributes to taking percocet more frequently.  She has not had foot numbness or saddle anesthesia.  She had imaging done 2 weeks ago including MRI which showed an infiltrative process at L2 vertebral body, lumbar spinal stenosis and RP soft tissue mass.  CT a/p showed signs suspicious of uterine ca with mets to bone and lung.  Pt was referred to an oncologist with appointment scheduled later this week, but due to increasing severity of pain, she was advised to come to the ED by her PCP.     At [facility] patient receieved CT guided biopsy. Tx to [facility] for further work up and management of spinal lesion (12 Sep 2018 19:54)  Underwent angio with embolization of the T2 lesion.    Daily ICU data reviewed.    EXAMINATION:  General:  calm, cooperative.  HEENT:  MMM  Neuro:  awake, alert, oriented x 3, follows commands, motor pain limited - BL UE 4/5,  Cards:  RRR  Respiratory:  no respiratory distress  Adomen:  soft  Extremities:  no edema  Skin:  warm/dry Ms. Son is a 72 y/o F with HTN, prediabetes and chronic back pain 2/2 spinal stenosis p/w worsening of chronic back pain.  Pt reports pain in lower back with R sided sciatica since early 2017.  She had imaging in 2017 which revealed lumbar spinal stenosis.  She had been prescribed gabapentin as well as percocet which she only needed sparingly.  Pt recently has developed worsening lower back pain with paresthesia radiating to L thigh.  Pain makes it difficult to walk, and she feels that her L leg is weaker.  Pt has been unable to lay flat, or stand 2/2 the pain and has spent most of her time sitting.  She has been taking percocet without relief.  She has chronic incontinence, but no new symptoms.  She has been constipated which she attributes to taking percocet more frequently.  She has not had foot numbness or saddle anesthesia.  She had imaging done 2 weeks ago including MRI which showed an infiltrative process at L2 vertebral body, lumbar spinal stenosis and RP soft tissue mass.  CT a/p showed signs suspicious of uterine ca with mets to bone and lung.  Pt was referred to an oncologist with appointment scheduled later this week, but due to increasing severity of pain, she was advised to come to the ED by her PCP.     At [facility] patient receieved CT guided biopsy. Tx to [facility] for further work up and management of spinal lesion (12 Sep 2018 19:54)  Underwent angio with embolization of the T2 lesion.    Daily ICU data reviewed.    EXAMINATION:  General:  calm, cooperative.  HEENT:  MMM  Neuro:  awake, alert, oriented x 3, follows commands, motor pain limited - BL UE 4/5, RLE 4/5, LLE 4/5 except for hip flexion 2/5   Cards:  RRR  Respiratory:  no respiratory distress  Adomen:  soft  Extremities:  no edema  Skin:  warm/dry

## 2018-09-19 DIAGNOSIS — D72.829 ELEVATED WHITE BLOOD CELL COUNT, UNSPECIFIED: ICD-10-CM

## 2018-09-19 DIAGNOSIS — K59.00 CONSTIPATION, UNSPECIFIED: ICD-10-CM

## 2018-09-19 LAB
GLUCOSE BLDC GLUCOMTR-MCNC: 146 MG/DL — HIGH (ref 70–99)
GLUCOSE BLDC GLUCOMTR-MCNC: 170 MG/DL — HIGH (ref 70–99)
GLUCOSE BLDC GLUCOMTR-MCNC: 257 MG/DL — HIGH (ref 70–99)

## 2018-09-19 PROCEDURE — 99233 SBSQ HOSP IP/OBS HIGH 50: CPT | Mod: GC

## 2018-09-19 PROCEDURE — 72131 CT LUMBAR SPINE W/O DYE: CPT | Mod: 26

## 2018-09-19 PROCEDURE — 99233 SBSQ HOSP IP/OBS HIGH 50: CPT

## 2018-09-19 RX ORDER — HYDROMORPHONE HYDROCHLORIDE 2 MG/ML
30 INJECTION INTRAMUSCULAR; INTRAVENOUS; SUBCUTANEOUS
Qty: 0 | Refills: 0 | Status: DISCONTINUED | OUTPATIENT
Start: 2018-09-19 | End: 2018-09-21

## 2018-09-19 RX ORDER — HYDROMORPHONE HYDROCHLORIDE 2 MG/ML
1 INJECTION INTRAMUSCULAR; INTRAVENOUS; SUBCUTANEOUS
Qty: 0 | Refills: 0 | Status: DISCONTINUED | OUTPATIENT
Start: 2018-09-19 | End: 2018-09-21

## 2018-09-19 RX ORDER — HYDROMORPHONE HYDROCHLORIDE 2 MG/ML
30 INJECTION INTRAMUSCULAR; INTRAVENOUS; SUBCUTANEOUS
Qty: 0 | Refills: 0 | Status: DISCONTINUED | OUTPATIENT
Start: 2018-09-19 | End: 2018-09-19

## 2018-09-19 RX ORDER — POLYETHYLENE GLYCOL 3350 17 G/17G
17 POWDER, FOR SOLUTION ORAL DAILY
Qty: 0 | Refills: 0 | Status: DISCONTINUED | OUTPATIENT
Start: 2018-09-19 | End: 2018-09-20

## 2018-09-19 RX ORDER — CYCLOBENZAPRINE HYDROCHLORIDE 10 MG/1
5 TABLET, FILM COATED ORAL THREE TIMES A DAY
Qty: 0 | Refills: 0 | Status: DISCONTINUED | OUTPATIENT
Start: 2018-09-19 | End: 2018-09-21

## 2018-09-19 RX ADMIN — Medication 1000 MILLIGRAM(S): at 03:09

## 2018-09-19 RX ADMIN — HYDROMORPHONE HYDROCHLORIDE 0.5 MILLIGRAM(S): 2 INJECTION INTRAMUSCULAR; INTRAVENOUS; SUBCUTANEOUS at 02:59

## 2018-09-19 RX ADMIN — LACTULOSE 20 GRAM(S): 10 SOLUTION ORAL at 05:28

## 2018-09-19 RX ADMIN — HYDROMORPHONE HYDROCHLORIDE 30 MILLILITER(S): 2 INJECTION INTRAMUSCULAR; INTRAVENOUS; SUBCUTANEOUS at 19:38

## 2018-09-19 RX ADMIN — Medication 4 MILLIGRAM(S): at 17:28

## 2018-09-19 RX ADMIN — SENNA PLUS 2 TABLET(S): 8.6 TABLET ORAL at 22:04

## 2018-09-19 RX ADMIN — HYDROMORPHONE HYDROCHLORIDE 30 MILLILITER(S): 2 INJECTION INTRAMUSCULAR; INTRAVENOUS; SUBCUTANEOUS at 07:41

## 2018-09-19 RX ADMIN — ENOXAPARIN SODIUM 40 MILLIGRAM(S): 100 INJECTION SUBCUTANEOUS at 22:04

## 2018-09-19 RX ADMIN — Medication 100 MILLIGRAM(S): at 15:56

## 2018-09-19 RX ADMIN — HYDROMORPHONE HYDROCHLORIDE 0.5 MILLIGRAM(S): 2 INJECTION INTRAMUSCULAR; INTRAVENOUS; SUBCUTANEOUS at 01:00

## 2018-09-19 RX ADMIN — HYDROMORPHONE HYDROCHLORIDE 30 MILLILITER(S): 2 INJECTION INTRAMUSCULAR; INTRAVENOUS; SUBCUTANEOUS at 15:29

## 2018-09-19 RX ADMIN — LACTULOSE 20 GRAM(S): 10 SOLUTION ORAL at 12:46

## 2018-09-19 RX ADMIN — Medication 100 MILLIGRAM(S): at 05:27

## 2018-09-19 RX ADMIN — HYDROMORPHONE HYDROCHLORIDE 30 MILLILITER(S): 2 INJECTION INTRAMUSCULAR; INTRAVENOUS; SUBCUTANEOUS at 15:08

## 2018-09-19 RX ADMIN — LOSARTAN POTASSIUM 50 MILLIGRAM(S): 100 TABLET, FILM COATED ORAL at 05:28

## 2018-09-19 RX ADMIN — Medication 100 MILLIGRAM(S): at 05:28

## 2018-09-19 RX ADMIN — Medication 4 MILLIGRAM(S): at 05:28

## 2018-09-19 RX ADMIN — Medication 100 MILLIGRAM(S): at 22:04

## 2018-09-19 RX ADMIN — HYDROMORPHONE HYDROCHLORIDE 30 MILLILITER(S): 2 INJECTION INTRAMUSCULAR; INTRAVENOUS; SUBCUTANEOUS at 22:08

## 2018-09-19 RX ADMIN — LACTULOSE 20 GRAM(S): 10 SOLUTION ORAL at 17:28

## 2018-09-19 RX ADMIN — HYDROMORPHONE HYDROCHLORIDE 30 MILLILITER(S): 2 INJECTION INTRAMUSCULAR; INTRAVENOUS; SUBCUTANEOUS at 02:00

## 2018-09-19 RX ADMIN — Medication 4 MILLIGRAM(S): at 12:46

## 2018-09-19 RX ADMIN — Medication 400 MILLIGRAM(S): at 02:54

## 2018-09-19 RX ADMIN — HYDROMORPHONE HYDROCHLORIDE 30 MILLILITER(S): 2 INJECTION INTRAMUSCULAR; INTRAVENOUS; SUBCUTANEOUS at 16:34

## 2018-09-19 RX ADMIN — CYCLOBENZAPRINE HYDROCHLORIDE 5 MILLIGRAM(S): 10 TABLET, FILM COATED ORAL at 14:44

## 2018-09-19 RX ADMIN — POLYETHYLENE GLYCOL 3350 17 GRAM(S): 17 POWDER, FOR SOLUTION ORAL at 14:47

## 2018-09-19 RX ADMIN — DEXTROSE MONOHYDRATE, SODIUM CHLORIDE, AND POTASSIUM CHLORIDE 100 MILLILITER(S): 50; .745; 4.5 INJECTION, SOLUTION INTRAVENOUS at 00:00

## 2018-09-19 RX ADMIN — HYDROMORPHONE HYDROCHLORIDE 30 MILLILITER(S): 2 INJECTION INTRAMUSCULAR; INTRAVENOUS; SUBCUTANEOUS at 11:09

## 2018-09-19 RX ADMIN — Medication 1: at 09:15

## 2018-09-19 RX ADMIN — Medication 1: at 22:42

## 2018-09-19 RX ADMIN — Medication 100 MILLIGRAM(S): at 14:44

## 2018-09-19 RX ADMIN — Medication 4 MILLIGRAM(S): at 00:40

## 2018-09-19 NOTE — PROGRESS NOTE ADULT - SUBJECTIVE AND OBJECTIVE BOX
Patient is a 73y old  Female who presents with a chief complaint of L2 lesion (19 Sep 2018 13:19)      SUBJECTIVE / OVERNIGHT EVENTS: Pt c/o lower back pain and RLE spasms. Pt also constipated.     MEDICATIONS  (STANDING):  ceFAZolin   IVPB 2000 milliGRAM(s) IV Intermittent every 8 hours  cyclobenzaprine 5 milliGRAM(s) Oral three times a day  dexamethasone  Injectable 4 milliGRAM(s) IV Push every 6 hours  dextrose 50% Injectable 12.5 Gram(s) IV Push once  dextrose 50% Injectable 25 Gram(s) IV Push once  dextrose 50% Injectable 25 Gram(s) IV Push once  docusate sodium 100 milliGRAM(s) Oral three times a day  enoxaparin Injectable 40 milliGRAM(s) SubCutaneous at bedtime  HYDROmorphone PCA (1 mG/mL) 30 milliLiter(s) PCA Continuous PCA Continuous  influenza   Vaccine 0.5 milliLiter(s) IntraMuscular once  insulin lispro (HumaLOG) corrective regimen sliding scale   SubCutaneous three times a day before meals  insulin lispro (HumaLOG) corrective regimen sliding scale   SubCutaneous at bedtime  lactulose Syrup 20 Gram(s) Oral every 6 hours  losartan 50 milliGRAM(s) Oral daily  polyethylene glycol 3350 17 Gram(s) Oral daily  senna 2 Tablet(s) Oral at bedtime  sodium chloride 0.9% with potassium chloride 20 mEq/L 1000 milliLiter(s) (100 mL/Hr) IV Continuous <Continuous>    MEDICATIONS  (PRN):  acetaminophen   Tablet .. 650 milliGRAM(s) Oral every 6 hours PRN Temp greater or equal to 38C (100.4F), Mild Pain (1 - 3)  dextrose 40% Gel 15 Gram(s) Oral once PRN Blood Glucose LESS THAN 70 milliGRAM(s)/deciliter  diphenhydrAMINE   Capsule 25 milliGRAM(s) Oral every 24 hours PRN Insomnia  glucagon  Injectable 1 milliGRAM(s) IntraMuscular once PRN Glucose LESS THAN 70 milligrams/deciliter  HYDROmorphone PCA (1 mG/mL) Rescue Clinician Bolus 1 milliGRAM(s) IV Push every 1 hour PRN for pain unrelieved with PCA demand dose  naloxone Injectable 0.1 milliGRAM(s) IV Push every 3 minutes PRN For ANY of the following changes in patient status:  A. RR LESS THAN 10 breaths per minute, B. Oxygen saturation LESS THAN 90%, C. Sedation score of 6  ondansetron Injectable 4 milliGRAM(s) IV Push every 6 hours PRN Nausea and/or Vomiting      Vital Signs Last 24 Hrs  T(C): 36.6 (19 Sep 2018 14:25), Max: 37.2 (19 Sep 2018 03:50)  T(F): 97.8 (19 Sep 2018 14:25), Max: 98.9 (19 Sep 2018 03:50)  HR: 88 (19 Sep 2018 14:25) (59 - 88)  BP: 137/69 (19 Sep 2018 14:25) (101/58 - 142/80)  BP(mean): 87 (19 Sep 2018 01:30) (76 - 87)  RR: 18 (19 Sep 2018 14:25) (16 - 22)  SpO2: 97% (19 Sep 2018 14:25) (91% - 100%)  CAPILLARY BLOOD GLUCOSE      POCT Blood Glucose.: 146 mg/dL (19 Sep 2018 14:21)  POCT Blood Glucose.: 170 mg/dL (19 Sep 2018 09:03)  POCT Blood Glucose.: 164 mg/dL (18 Sep 2018 21:43)    I&O's Summary    18 Sep 2018 07:01  -  19 Sep 2018 07:00  --------------------------------------------------------  IN: 1300 mL / OUT: 2210 mL / NET: -910 mL    19 Sep 2018 07:01  -  19 Sep 2018 14:44  --------------------------------------------------------  IN: 320 mL / OUT: 800 mL / NET: -480 mL        PHYSICAL EXAM:  GENERAL: NAD  HEAD:  Atraumatic, Normocephalic  EYES: EOMI, PERRLA, conjunctiva and sclera clear  NECK: Supple, No JVD  CHEST/LUNG: Clear to auscultation bilaterally; No wheeze  HEART: Regular rate and rhythm; S1S2  ABDOMEN: Soft, Nontender, obese; Bowel sounds present  EXTREMITIES:  2+ Peripheral Pulses, No clubbing, cyanosis, +LE edema L>R  PSYCH: AAOx3  NEUROLOGY: CN's intact, LLE weakness   SKIN: No lesions, leg erythema significantly improved     LABS:                        11.3   27.1  )-----------( 226      ( 18 Sep 2018 21:35 )             34.6     09-18    138  |  102  |  27<H>  ----------------------------<  163<H>  4.2   |  20<L>  |  0.90    Ca    8.9      18 Sep 2018 21:35  Phos  3.2     09-18  Mg     2.2     09-18                RADIOLOGY & ADDITIONAL TESTS:    Imaging Personally Reviewed:  CXR:  Right IJ central venous catheter with its tip in the SVC.    Bilateral pulmonary nodules are these were present on the CT abdomen   pelvis from 9/6/2018. These are suspicious for metastatic disease.    Consultant(s) Notes Reviewed:      Care Discussed with Consultants/Other Providers:

## 2018-09-19 NOTE — PROGRESS NOTE ADULT - PROBLEM SELECTOR PLAN 3
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

## 2018-09-19 NOTE — PROGRESS NOTE ADULT - PROBLEM SELECTOR PLAN 4
Cellulitis of LLE diagnosed at [facility], resolving   Complete course of Ancef   Blood cultures NGTD  LE dopplers negative for DVT.

## 2018-09-19 NOTE — PROGRESS NOTE ADULT - SUBJECTIVE AND OBJECTIVE BOX
SUBJECTIVE: Patient and examined at bedside, laying down, NAD. Continues on PCA for pain, encouraged to get OOB to chair.     Vital Signs Last 24 Hrs  T(C): 36.6 (19 Sep 2018 14:25), Max: 37.2 (19 Sep 2018 03:50)  T(F): 97.8 (19 Sep 2018 14:25), Max: 98.9 (19 Sep 2018 03:50)  HR: 88 (19 Sep 2018 14:25) (59 - 88)  BP: 137/69 (19 Sep 2018 14:25) (101/58 - 142/80)  BP(mean): 87 (19 Sep 2018 01:30) (76 - 87)  RR: 18 (19 Sep 2018 14:25) (16 - 22)  SpO2: 97% (19 Sep 2018 14:25) (91% - 100%)    PHYSICAL EXAM:    General: No Acute Distress     Neurological: Awake, alert oriented to person, place and time, Following Commands, PERRL, EOMI, Face Symmetrical, Speech Fluent, Moving all extremities,  UEs 5/5 bilaterally, RLE 5/5  LLE: HF 2/5, KE/KF 4/5, DF/PF 4/5  Decreased sensation LLE     Pulmonary: Clear to Auscultation, No Rales, No Rhonchi, No Wheezes     Cardiovascular: S1, S2, Regular Rate and Rhythm     Gastrointestinal: Soft, Nontender, Nondistended     Incision: clean, dry, intact     LABS:                        11.3   27.1  )-----------( 226      ( 18 Sep 2018 21:35 )             34.6    09-18    138  |  102  |  27<H>  ----------------------------<  163<H>  4.2   |  20<L>  |  0.90    Ca    8.9      18 Sep 2018 21:35  Phos  3.2     09-18  Mg     2.2     09-18      Hemoglobin A1C, Whole Blood: 6.1 % (09-13 @ 08:06)      09-18 @ 07:01  -  09-19 @ 07:00  --------------------------------------------------------  IN: 1300 mL / OUT: 2210 mL / NET: -910 mL    09-19 @ 07:01 - 09-19 @ 15:27  --------------------------------------------------------  IN: 320 mL / OUT: 800 mL / NET: -480 mL      DRAINS: R HMV (0)  L HMV (375cc)     MEDICATIONS:  Antibiotics:  ceFAZolin   IVPB 2000 milliGRAM(s) IV Intermittent every 8 hours    Neuro:  acetaminophen   Tablet .. 650 milliGRAM(s) Oral every 6 hours PRN Temp greater or equal to 38C (100.4F), Mild Pain (1 - 3)  cyclobenzaprine 5 milliGRAM(s) Oral three times a day  diphenhydrAMINE   Capsule 25 milliGRAM(s) Oral every 24 hours PRN Insomnia  HYDROmorphone PCA (1 mG/mL) 30 milliLiter(s) PCA Continuous PCA Continuous  HYDROmorphone PCA (1 mG/mL) Rescue Clinician Bolus 1 milliGRAM(s) IV Push every 1 hour PRN for pain unrelieved with PCA demand dose  ondansetron Injectable 4 milliGRAM(s) IV Push every 6 hours PRN Nausea and/or Vomiting    Cardiac:  losartan 50 milliGRAM(s) Oral daily    Pulm:    GI/:  docusate sodium 100 milliGRAM(s) Oral three times a day  lactulose Syrup 20 Gram(s) Oral every 6 hours  polyethylene glycol 3350 17 Gram(s) Oral daily  senna 2 Tablet(s) Oral at bedtime    Other:   dexamethasone  Injectable 4 milliGRAM(s) IV Push every 6 hours  dextrose 40% Gel 15 Gram(s) Oral once PRN Blood Glucose LESS THAN 70 milliGRAM(s)/deciliter  dextrose 50% Injectable 12.5 Gram(s) IV Push once  dextrose 50% Injectable 25 Gram(s) IV Push once  dextrose 50% Injectable 25 Gram(s) IV Push once  enoxaparin Injectable 40 milliGRAM(s) SubCutaneous at bedtime  glucagon  Injectable 1 milliGRAM(s) IntraMuscular once PRN Glucose LESS THAN 70 milligrams/deciliter  influenza   Vaccine 0.5 milliLiter(s) IntraMuscular once  insulin lispro (HumaLOG) corrective regimen sliding scale   SubCutaneous three times a day before meals  insulin lispro (HumaLOG) corrective regimen sliding scale   SubCutaneous at bedtime  naloxone Injectable 0.1 milliGRAM(s) IV Push every 3 minutes PRN For ANY of the following changes in patient status:  A. RR LESS THAN 10 breaths per minute, B. Oxygen saturation LESS THAN 90%, C. Sedation score of 6  sodium chloride 0.9% with potassium chloride 20 mEq/L 1000 milliLiter(s) IV Continuous <Continuous>    DIET: [x] Diabetic diet  [] CCD [] Renal [] Puree [] Dysphagia [] Tube Feeds:     IMAGING:

## 2018-09-19 NOTE — PROGRESS NOTE ADULT - ASSESSMENT
73F s/p L2 corpectomy T11-L4 fusion  - monitor drain output  - pt  - pain control  - f/u path  CT in AM

## 2018-09-19 NOTE — PROGRESS NOTE ADULT - SUBJECTIVE AND OBJECTIVE BOX
Day __1_ of Anesthesia Pain Management Service    SUBJECTIVE:    Pain Scale Score	At rest: ___ 	With Activity: ___ 	[ x] Refer to charted pain scores    THERAPY:    [ ] IV PCA Morphine		[ ] 5 mg/mL	[ ] 1 mg/mL  [x ] IV PCA Hydromorphone	[ ] 5 mg/mL	[x ] 1 mg/mL  [ ] IV PCA Fentanyl		[ ] 50 micrograms/mL    Demand dose 0.3mg    lockout  6  (minutes) 0Continuous Rate          MEDICATIONS  (STANDING):  ceFAZolin   IVPB 2000 milliGRAM(s) IV Intermittent every 8 hours  dexamethasone  Injectable 4 milliGRAM(s) IV Push every 6 hours  dextrose 50% Injectable 12.5 Gram(s) IV Push once  dextrose 50% Injectable 25 Gram(s) IV Push once  dextrose 50% Injectable 25 Gram(s) IV Push once  docusate sodium 100 milliGRAM(s) Oral three times a day  enoxaparin Injectable 40 milliGRAM(s) SubCutaneous at bedtime  HYDROmorphone PCA (1 mG/mL) 30 milliLiter(s) PCA Continuous PCA Continuous  influenza   Vaccine 0.5 milliLiter(s) IntraMuscular once  insulin lispro (HumaLOG) corrective regimen sliding scale   SubCutaneous three times a day before meals  insulin lispro (HumaLOG) corrective regimen sliding scale   SubCutaneous at bedtime  lactulose Syrup 20 Gram(s) Oral every 6 hours  losartan 50 milliGRAM(s) Oral daily  senna 2 Tablet(s) Oral at bedtime  sodium chloride 0.9% with potassium chloride 20 mEq/L 1000 milliLiter(s) (100 mL/Hr) IV Continuous <Continuous>    MEDICATIONS  (PRN):  acetaminophen   Tablet .. 650 milliGRAM(s) Oral every 6 hours PRN Temp greater or equal to 38C (100.4F), Mild Pain (1 - 3)  cyclobenzaprine 5 milliGRAM(s) Oral three times a day PRN Muscle Spasm  dextrose 40% Gel 15 Gram(s) Oral once PRN Blood Glucose LESS THAN 70 milliGRAM(s)/deciliter  diphenhydrAMINE   Capsule 25 milliGRAM(s) Oral every 24 hours PRN Insomnia  glucagon  Injectable 1 milliGRAM(s) IntraMuscular once PRN Glucose LESS THAN 70 milligrams/deciliter  HYDROmorphone PCA (1 mG/mL) Rescue Clinician Bolus 0.5 milliGRAM(s) IV Push every 15 minutes PRN for Pain Scale GREATER THAN 6  naloxone Injectable 0.1 milliGRAM(s) IV Push every 3 minutes PRN For ANY of the following changes in patient status:  A. RR LESS THAN 10 breaths per minute, B. Oxygen saturation LESS THAN 90%, C. Sedation score of 6  ondansetron Injectable 4 milliGRAM(s) IV Push every 6 hours PRN Nausea and/or Vomiting      OBJECTIVE:    Sedation Score:	[x ] Alert	[ ] Drowsy 	[ ] Arousable	[ ] Asleep	[ ] Unresponsive    Side Effects:	[ x] None	[ ] Nausea	[ ] Vomiting	[ ] Pruritus  		[ ] Other:    Vital Signs Last 24 Hrs  T(C): 36.7 (19 Sep 2018 09:13), Max: 37.2 (19 Sep 2018 03:50)  T(F): 98.1 (19 Sep 2018 09:13), Max: 98.9 (19 Sep 2018 03:50)  HR: 86 (19 Sep 2018 09:13) (53 - 86)  BP: 127/78 (19 Sep 2018 09:13) (101/58 - 142/80)  BP(mean): 87 (19 Sep 2018 01:30) (76 - 87)  RR: 18 (19 Sep 2018 09:13) (16 - 22)  SpO2: 96% (19 Sep 2018 09:13) (91% - 100%)    ASSESSMENT/ PLAN    Therapy to  be:	[x ] Continue   [ ] Discontinued   [ ] Change to prn Analgesics    Documentation and Verification of current medications:   [X] Done	[ ] Not done, not elligible    Comments: I was physically present for the key portionjs of the evaluation and management service provided. I agree with the above history, physical, and plan which I have reviewed and edited where appropriate

## 2018-09-19 NOTE — CHART NOTE - NSCHARTNOTEFT_GEN_A_CORE
73 y/o with suspected uterine CA, lung and L2 mets (pathology is metastatic adenocarcinoma) with L2 cord compression-status post surgery with Dr Caceres  discussed with him today, patient needs outpatient SBRT at [facility] in about 2 weeks, would recommend outpatient Rad.Onc. consultation at [facility] with Dr Miguel Guadarrama or one of his associates  office phone number    Clint Hairston MD  cell

## 2018-09-19 NOTE — PROGRESS NOTE ADULT - ASSESSMENT
73F with PMH of HTN, spinal stenosis, chronic back pain here with worsening pain. Pain started in early 2017 with R-sided sciatica, resulting in diagnosis of lumbar spinal stenosis, and has used gabapentin and percocet at home. Pain subsequently worsened, causing difficulty walking and difficulty being supine, and now has parasthesias radiating to her L thigh. Also with constipation. CT A/P showed concern for new metastatic uterine CA to bone and lung, and came to [facility] from [facility] for management of her spinal lesion, now s/p T11-L4 fusion, and L2 corpectomy.

## 2018-09-19 NOTE — PROGRESS NOTE ADULT - SUBJECTIVE AND OBJECTIVE BOX
Pt seen and examined. Reports back pain. No acute events o/n.     T(C): 36.5 (09-19-18 @ 04:52), Max: 37.2 (09-19-18 @ 03:50)  T(F): 97.7 (09-19-18 @ 04:52), Max: 98.9 (09-19-18 @ 03:50)  HR: 76 (09-19-18 @ 04:52) (53 - 84)  BP: 123/72 (09-19-18 @ 04:52) (101/58 - 142/80)  RR: 18 (09-19-18 @ 04:52) (16 - 22)  SpO2: 98% (09-19-18 @ 04:52) (91% - 100%)      18 Sep 2018 07:01  -  19 Sep 2018 07:00  --------------------------------------------------------  IN:    sodium chloride 0.9% with potassium chloride 20 mEq/L: 700 mL    sodium chloride 0.9% with potassium chloride 20 mEq/L: 500 mL  Total IN: 1200 mL    OUT:    Drain: 375 mL    Indwelling Catheter - Urethral: 1835 mL  Total OUT: 2210 mL    Total NET: -1010 mL      Exam:  Back soft, dressed c/d/i  JPs serosanguinous                          11.3   27.1  )-----------( 226      ( 18 Sep 2018 21:35 )             34.6     09-18    138  |  102  |  27<H>  ----------------------------<  163<H>  4.2   |  20<L>  |  0.90    Ca    8.9      18 Sep 2018 21:35  Phos  3.2     09-18  Mg     2.2     09-18

## 2018-09-19 NOTE — DIETITIAN INITIAL EVALUATION ADULT. - NS AS NUTRI INTERV MEALS SNACK
General/healthful diet/Recommend liberalized diet to regular to promote optimal po intake in setting of reduced appetite. Pt's food preferences obtained and honored on menu.

## 2018-09-19 NOTE — PHYSICAL THERAPY INITIAL EVALUATION ADULT - PRECAUTIONS/LIMITATIONS, REHAB EVAL
spinal precautions/surgical precautions/fall precautions/Pt has spent most of her time sitting because that makes the pain better. She has been taking percocet w/o relief. She had imaging done 2wks ago including MRI which showed an infiltrative process at L2 vertebral body, lumbar spinal stenosis & RP soft tissue mass. CT a/p showed signs suspicious of uterine ca w/ mets to bone & lung. Pt was admitted to [facility] for increasing severity of pain. At [facility] pt received CT guided biopsy and transferred to [facility] for neurosurgery eval

## 2018-09-19 NOTE — DIETITIAN INITIAL EVALUATION ADULT. - OTHER INFO
Pt seen for LOS admission. Pt seen for LOS admission. Pt currently seen in bed, pt c/o pain. Admits to reduced appetite, pt only able to consume a few bites of cold cereal this morning and did not have breakfast. Pt seen for LOS admission. Pt currently seen in bed, pt c/o right leg pain and calories. Admits to reduced appetite, pt only able to consume a few bites of cold cereal this morning and did not have lunch as yet. States food preferences. Pt denies chewing/swallowing difficulty, denies N+V, admits to fatigue and constipation, believes last BM was 9/16 confirmed with flowsheet records. Pt endorses wt loss since Jan 2018 2/2 joining weight watchers program; was 342 pounds Jan 2018 and was able to lose 40 pounds intentionally. states she last weighed herself in August and was 302 pounds, consistent with admit wt 302 pounds

## 2018-09-19 NOTE — CHART NOTE - NSCHARTNOTEFT_GEN_A_CORE
Upon Nutritional Assessment by the Registered Dietitian your patient was determined to meet criteria / has evidence of the following diagnosis/diagnoses:          [ ]  Mild Protein Calorie Malnutrition        [ ]  Moderate Protein Calorie Malnutrition        [ ] Severe Protein Calorie Malnutrition        [ ] Unspecified Protein Calorie Malnutrition        [ ] Underweight / BMI <19        [x] Morbid Obesity / BMI > 40      Findings as based on:  [x] Comprehensive nutrition assessment   [ ] Nutrition Focused Physical Exam  [x] Other: BMI 47.3 kG/m2.      Nutrition Plan/Recommendations:      Please see RD initial assessment    PROVIDER Section:     By signing this assessment you are acknowledging and agree with the diagnosis/diagnoses assigned by the Registered Dietitian    Comments:

## 2018-09-19 NOTE — PROGRESS NOTE ADULT - ASSESSMENT
Ms. Son is a 74 y/o F with HTN, prediabetes and chronic back pain 2/2 spinal stenosis p/w worsening of chronic back pain.  Pt reports pain in lower back with R sided sciatica since early 2017.  She had imaging in 2017 which revealed lumbar spinal stenosis.  She had been prescribed gabapentin as well as percocet which she only needed sparingly.  Pt recently has developed worsening lower back pain with paresthesia radiating to L thigh.  Pain makes it difficult to walk, and she feels that her L leg is weaker.  Pt has been unable to lay flat, or stand 2/2 the pain and has spent most of her time sitting.  She has been taking percocet without relief.  She has chronic incontinence, but no new symptoms.  She has been constipated which she attributes to taking percocet more frequently.  She has not had foot numbness or saddle anesthesia.  She had imaging done 2 weeks ago including MRI which showed an infiltrative process at L2 vertebral body, lumbar spinal stenosis and RP soft tissue mass.  CT a/p showed signs suspicious of uterine ca with mets to bone and lung.  Pt was referred to an oncologist with appointment scheduled later this week, but due to increasing severity of pain, she was advised to come to the ED by her PCP. At [facility] patient received CT guided biopsy. Tx to [facility] for further work up and management of spinal lesion (12 Sep 2018 19:54)  Patient is s/p angio/embo on 9/17 and L2 corpectomy, T11-L4 fusion and exesion of the mass on 9/18/18 and paraspinal muscle flaps and complex closure with plastics.      Plan:  - Pain control as needed ( continue PCA, Tylenol PRN, Flexeril)  - Dex 4q6   - Palliative care team following for pain management, recommends starting basal rate of 0.5mg/hr for PCA and increase demand to 0.5 as patient reporting only mild improvement of pain with current regimen, 1mg rescue clinician bolus, changed Flexeril 5mg TID to standing, add Miralax qd to current bowel regimen per pal care.  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 Miguel Guadarrama or one of his associates  -Plastics following, next Aquacel change 9/21. Continue drains.  - GYN consult pending  - Cellulitis of LLE diagnosed at [facility], resolving, Complete course of Ancef   - Wound care consult placed   - Blood cultures NGTD  - LE dopplers negative for DVT   - CT Lumbar spine w/o done, read pending   - Diabetic diet  - Bowel regimen   - Continue insulin sliding scale   - Monitor fingersticks while on Decadron   - Leukocytosis (WBC 27.1) likely stress induced and due tosteroids, No signs/symptoms of infection   - Will trend CBC   - Encouraged OOB, incentive spirometry and working with PT  - Dispo/PT: Anticipate Acute Rehab     Will discuss with Dr. Nicki Schumacher PA-C # [id number] Ms. Son is a 72 y/o F with HTN, prediabetes and chronic back pain 2/2 spinal stenosis p/w worsening of chronic back pain.  Pt reports pain in lower back with R sided sciatica since early 2017.  She had imaging in 2017 which revealed lumbar spinal stenosis.  She had been prescribed gabapentin as well as percocet which she only needed sparingly.  Pt recently has developed worsening lower back pain with paresthesia radiating to L thigh.  Pain makes it difficult to walk, and she feels that her L leg is weaker.  Pt has been unable to lay flat, or stand 2/2 the pain and has spent most of her time sitting.  She has been taking percocet without relief.  She has chronic incontinence, but no new symptoms.  She has been constipated which she attributes to taking percocet more frequently.  She has not had foot numbness or saddle anesthesia.  She had imaging done 2 weeks ago including MRI which showed an infiltrative process at L2 vertebral body, lumbar spinal stenosis and RP soft tissue mass.  CT a/p showed signs suspicious of uterine ca with mets to bone and lung.  Pt was referred to an oncologist with appointment scheduled later this week, but due to increasing severity of pain, she was advised to come to the ED by her PCP. At [facility] patient received CT guided biopsy. Tx to [facility] for further work up and management of spinal lesion (12 Sep 2018 19:54)  Patient is s/p angio/embo on 9/17 and L2 corpectomy, T11-L4 fusion and exesion of the mass on 9/18/18 and paraspinal muscle flaps and complex closure with plastics.      Plan:  - Pain control as needed ( continue PCA, Tylenol PRN, Flexeril)  - Dex 4q6   - Palliative care team following for pain management, recommends starting basal rate of 0.5mg/hr for PCA and increase demand to 0.5 as patient reporting only mild improvement of pain with current regimen, 1mg rescue clinician bolus, changed Flexeril 5mg TID to standing, add Miralax qd to current bowel regimen per pal care.  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 Miguel Guadarrama or one of his associates  -Plastics following, next Aquacel change 9/21. Continue drains.  - GYN consult pending  - Cellulitis of LLE diagnosed at [facility], resolving, Complete course of Ancef   - Wound care consult placed   - Blood cultures NGTD  - LE dopplers negative for DVT   - CT Lumbar spine w/o done, read pending   - Diabetic diet  - Bowel regimen   - Continue insulin sliding scale   - Monitor fingersticks while on Decadron   - Leukocytosis (WBC 27.1) likely stress induced and due tosteroids, No signs/symptoms of infection   - Will trend CBC   - Encouraged OOB, incentive spirometry and working with PT  - D/C Juan when OOB   - Dispo/PT: Anticipate Acute Rehab     Will discuss with Dr. Nicki Schumacher PAVargheseC # [id number]

## 2018-09-19 NOTE — PROGRESS NOTE ADULT - SUBJECTIVE AND OBJECTIVE BOX
GAP TEAM PALLIATIVE CARE UNIT PROGRESS NOTE:      [  ] Patient on hospice program.    INTERVAL HPI/OVERNIGHT EVENTS:  Patient continues to complain of back pain. Patient states that her back pain is approximately 7-8/10 and improves to 5-6/10 after using her PCA pump. Patient reports that her last BM was approximately 2 day ago. Patient has used a total of 4.6 mg of IV dilaudid over the last 24 hrs, 2.6 of which was from her PCA.     DNR on chart:   Allergies    No Known Allergies    Intolerances    MEDICATIONS  (STANDING):  ceFAZolin   IVPB 2000 milliGRAM(s) IV Intermittent every 8 hours  dexamethasone  Injectable 4 milliGRAM(s) IV Push every 6 hours  dextrose 50% Injectable 12.5 Gram(s) IV Push once  dextrose 50% Injectable 25 Gram(s) IV Push once  dextrose 50% Injectable 25 Gram(s) IV Push once  docusate sodium 100 milliGRAM(s) Oral three times a day  enoxaparin Injectable 40 milliGRAM(s) SubCutaneous at bedtime  HYDROmorphone PCA (1 mG/mL) 30 milliLiter(s) PCA Continuous PCA Continuous  influenza   Vaccine 0.5 milliLiter(s) IntraMuscular once  insulin lispro (HumaLOG) corrective regimen sliding scale   SubCutaneous three times a day before meals  insulin lispro (HumaLOG) corrective regimen sliding scale   SubCutaneous at bedtime  lactulose Syrup 20 Gram(s) Oral every 6 hours  losartan 50 milliGRAM(s) Oral daily  senna 2 Tablet(s) Oral at bedtime  sodium chloride 0.9% with potassium chloride 20 mEq/L 1000 milliLiter(s) (100 mL/Hr) IV Continuous <Continuous>    MEDICATIONS  (PRN):  acetaminophen   Tablet .. 650 milliGRAM(s) Oral every 6 hours PRN Temp greater or equal to 38C (100.4F), Mild Pain (1 - 3)  cyclobenzaprine 5 milliGRAM(s) Oral three times a day PRN Muscle Spasm  dextrose 40% Gel 15 Gram(s) Oral once PRN Blood Glucose LESS THAN 70 milliGRAM(s)/deciliter  diphenhydrAMINE   Capsule 25 milliGRAM(s) Oral every 24 hours PRN Insomnia  glucagon  Injectable 1 milliGRAM(s) IntraMuscular once PRN Glucose LESS THAN 70 milligrams/deciliter  HYDROmorphone PCA (1 mG/mL) Rescue Clinician Bolus 0.5 milliGRAM(s) IV Push every 15 minutes PRN for Pain Scale GREATER THAN 6  naloxone Injectable 0.1 milliGRAM(s) IV Push every 3 minutes PRN For ANY of the following changes in patient status:  A. RR LESS THAN 10 breaths per minute, B. Oxygen saturation LESS THAN 90%, C. Sedation score of 6  ondansetron Injectable 4 milliGRAM(s) IV Push every 6 hours PRN Nausea and/or Vomiting    ITEMS UNCHECKED ARE NOT PRESENT    PRESENT SYMPTOMS: [ ]Unable to obtain due to poor mentation   Source if other than patient:  [ ]Family   [ ]Team     Pain (Impact on QOL):    Location:       Minimal acceptable level (0-10 scale):                    Aggravating factors:  Quality:  Radiation:  Severity (0-10 scale):     Dyspnea:                           [ ]Mild [ ]Moderate [ ]Severe  Anxiety:                             [ ]Mild [ ]Moderate [ ]Severe  Fatigue:                             [ ]Mild [ ]Moderate [ ]Severe  Nausea:                             [ ]Mild [ ]Moderate [ ]Severe  Loss of appetite:              [ ]Mild [ ]Moderate [ ]Severe  Constipation:                    [ ]Mild [ ]Moderate [ ]Severe    PAINAD Score:    [email or web address] (Ctrl +  left click to view)  		  Other Symptoms:  [ ]All other review of systems negative     Karnofsky Performance Score/Palliative Performance Status Version 2:         %        http://palliative.info/resource_material/PPSv2.pdf  PHYSICAL EXAM:  Vital Signs Last 24 Hrs  T(C): 36.7 (19 Sep 2018 09:13), Max: 37.2 (19 Sep 2018 03:50)  T(F): 98.1 (19 Sep 2018 09:13), Max: 98.9 (19 Sep 2018 03:50)  HR: 86 (19 Sep 2018 09:13) (53 - 86)  BP: 127/78 (19 Sep 2018 09:13) (101/58 - 142/80)  BP(mean): 87 (19 Sep 2018 01:30) (76 - 87)  RR: 18 (19 Sep 2018 09:13) (16 - 22)  SpO2: 96% (19 Sep 2018 09:13) (91% - 100%) I&O's Summary    18 Sep 2018 07:01  -  19 Sep 2018 07:00  --------------------------------------------------------  IN: 1300 mL / OUT: 2210 mL / NET: -910 mL    19 Sep 2018 07:01  -  19 Sep 2018 13:20  --------------------------------------------------------  IN: 120 mL / OUT: 0 mL / NET: 120 mL    GENERAL:  [X ]Alert  [X ]Oriented x3   [ ]Lethargic  [ ]Cachexia  [ ]Unarousable  [ ]Verbal  [ ]Non-Verbal  Behavioral:   [ ] Anxiety  [ ] Delirium [ ] Agitation [ ] Other  HEENT:  [C ]Normal   [ ]Dry mouth   [ ]ET Tube/Trach  [ ]Oral lesions  PULMONARY:   [C ]Clear [ ]Tachypnea  [ ]Audible excessive secretions   [ ]Rhonchi        [ ]Right [ ]Left [ ]Bilateral  [ ]Crackles        [ ]Right [ ]Left [ ]Bilateral  [ ]Wheezing     [ ]Right [ ]Left [ ]Bilateral  CARDIOVASCULAR:    [ C]Regular [ ]Irregular [ ]Tachy  [ ]Spencer [ ]Murmur [ ]Other  GASTROINTESTINAL:  [C ]Soft  [ ]Distended   [ ]+BS  [C ]Non tender [ ]Tender  [ ]PEG [ ]OGT/ NGT   Last BM:   9/17/18  GENITOURINARY:  [ ]Normal [ ] Incontinent   [ ]Oliguria/Anuria   [ ]Martinez  MUSCULOSKELETAL:   [X ]Normal   [ ]Weakness  [ ]Bed/Wheelchair bound [ ]Edema  NEUROLOGIC:   [ X]No focal deficits  [ ] Cognitive impairment  [ ] Dysphagia [ ]Dysarthria [ ] Paresis [ ]Other   SKIN:   [ X]Normal   [ ]Pressure ulcer(s)  [ ]Rash    CRITICAL CARE:  [ ] Shock Present  [ ]Septic [ ]Cardiogenic [ ]Neurologic [ ]Hypovolemic  [ ]  Vasopressors [ ]  Inotropes   [ ] Respiratory failure present  [ ] Acute  [ ] Chronic [ ] Hypoxic  [ ] Hypercarbic [ ] Other  [ ] Other organ failure     LABS:                        11.3   27.1  )-----------( 226      ( 18 Sep 2018 21:35 )             34.6   09-18    138  |  102  |  27<H>  ----------------------------<  163<H>  4.2   |  20<L>  |  0.90    Ca    8.9      18 Sep 2018 21:35  Phos  3.2     09-18  Mg     2.2     09-18          RADIOLOGY & ADDITIONAL STUDIES:    PROTEIN CALORIE MALNUTRITION:   [ ] PPSV2 < or = 30% [ ] significant weight loss [ ] poor nutritional intake [ ] anasarca [ ] catabolic state   Albumin, Serum: 3.5 g/dL (09-12-18 @ 23:02)   Artificial Nutrition [ ]     REFERRALS:   [ ]Chaplaincy  [ ] Hospice  [ ]Child Life  [ ]Social Work  [ ]Case management [ ]Holistic Therapy [ ] Physical Therapy [ ] Dietary   Goals of Care Document: GAP TEAM PALLIATIVE CARE  PROGRESS NOTE:        INTERVAL HPI/OVERNIGHT EVENTS:  Patient continues to complain of back pain. Patient states that her back pain is approximately 7-8/10 and improves to 5-6/10 after using her PCA pump. Patient reports that her last BM was approximately 2 day ago. Patient has used a total of 4.6 mg of IV dilaudid over the last 24 hrs, approximately 2.6 of which was from her PCA.     DNR on chart:   Allergies    No Known Allergies    Intolerances    MEDICATIONS  (STANDING):  ceFAZolin   IVPB 2000 milliGRAM(s) IV Intermittent every 8 hours  cyclobenzaprine 5 milliGRAM(s) Oral three times a day  dexamethasone  Injectable 4 milliGRAM(s) IV Push every 6 hours  dextrose 50% Injectable 12.5 Gram(s) IV Push once  dextrose 50% Injectable 25 Gram(s) IV Push once  dextrose 50% Injectable 25 Gram(s) IV Push once  docusate sodium 100 milliGRAM(s) Oral three times a day  enoxaparin Injectable 40 milliGRAM(s) SubCutaneous at bedtime  HYDROmorphone PCA (1 mG/mL) 30 milliLiter(s) PCA Continuous PCA Continuous  influenza   Vaccine 0.5 milliLiter(s) IntraMuscular once  insulin lispro (HumaLOG) corrective regimen sliding scale   SubCutaneous three times a day before meals  insulin lispro (HumaLOG) corrective regimen sliding scale   SubCutaneous at bedtime  lactulose Syrup 20 Gram(s) Oral every 6 hours  losartan 50 milliGRAM(s) Oral daily  polyethylene glycol 3350 17 Gram(s) Oral daily  senna 2 Tablet(s) Oral at bedtime  sodium chloride 0.9% with potassium chloride 20 mEq/L 1000 milliLiter(s) (100 mL/Hr) IV Continuous <Continuous>    MEDICATIONS  (PRN):  acetaminophen   Tablet .. 650 milliGRAM(s) Oral every 6 hours PRN Temp greater or equal to 38C (100.4F), Mild Pain (1 - 3)  dextrose 40% Gel 15 Gram(s) Oral once PRN Blood Glucose LESS THAN 70 milliGRAM(s)/deciliter  diphenhydrAMINE   Capsule 25 milliGRAM(s) Oral every 24 hours PRN Insomnia  glucagon  Injectable 1 milliGRAM(s) IntraMuscular once PRN Glucose LESS THAN 70 milligrams/deciliter  HYDROmorphone PCA (1 mG/mL) Rescue Clinician Bolus 1 milliGRAM(s) IV Push every 1 hour PRN for pain unrelieved with PCA demand dose  naloxone Injectable 0.1 milliGRAM(s) IV Push every 3 minutes PRN For ANY of the following changes in patient status:  A. RR LESS THAN 10 breaths per minute, B. Oxygen saturation LESS THAN 90%, C. Sedation score of 6  ondansetron Injectable 4 milliGRAM(s) IV Push every 6 hours PRN Nausea and/or Vomiting    ITEMS UNCHECKED ARE NOT PRESENT    PRESENT SYMPTOMS: [ ]Unable to obtain due to poor mentation   Source if other than patient:  [ ]Family   [ ]Team     Pain (Impact on QOL):  limits function  Location: bilateral lower back   Minimal acceptable level (0-10 scale): < 5 /10                    Aggravating factors: movement  Quality:  Radiation: RLE  Severity (0-10 scale):  up to 8-9/10    Dyspnea:                           [ ]Mild [ ]Moderate [ ]Severe  Anxiety:                             [ ]Mild [ ]Moderate [ ]Severe  Fatigue:                             [ ]Mild [ ]Moderate [ ]Severe  Nausea:                             [ ]Mild [ ]Moderate [ ]Severe  Loss of appetite:              [ ]Mild [ ]Moderate [ ]Severe  Constipation:                    [ ]Mild [ ]Moderate [ ]Severe    PAINAD Score:    [email or web address]/[email or web address].pdf (Ctrl +  left click to view)  		  Other Symptoms:  [ ]All other review of systems negative     Karnofsky Performance Score/Palliative Performance Status Version 2:         %        http://palliative.info/resource_material/PPSv2.pdf    PHYSICAL EXAM:  Vital Signs Last 24 Hrs  T(C): 36.7 (19 Sep 2018 09:13), Max: 37.2 (19 Sep 2018 03:50)  T(F): 98.1 (19 Sep 2018 09:13), Max: 98.9 (19 Sep 2018 03:50)  HR: 86 (19 Sep 2018 09:13) (53 - 86)  BP: 127/78 (19 Sep 2018 09:13) (101/58 - 142/80)  BP(mean): 87 (19 Sep 2018 01:30) (76 - 87)  RR: 18 (19 Sep 2018 09:13) (16 - 22)  SpO2: 96% (19 Sep 2018 09:13) (91% - 100%) I&O's Summary    18 Sep 2018 07:01  -  19 Sep 2018 07:00  --------------------------------------------------------  IN: 1300 mL / OUT: 2210 mL / NET: -910 mL    19 Sep 2018 07:01  -  19 Sep 2018 13:20  --------------------------------------------------------  IN: 120 mL / OUT: 0 mL / NET: 120 mL    GENERAL:  [X ]Alert  [X ]Oriented x3   [ ]Lethargic  [ ]Cachexia  [ ]Unarousable  [ ]Verbal  [ ]Non-Verbal  Behavioral:   [ ] Anxiety  [ ] Delirium [ ] Agitation [ ] Other  HEENT:  [C ]Normal   [ ]Dry mouth   [ ]ET Tube/Trach  [ ]Oral lesions  PULMONARY:   [C ]Clear [ ]Tachypnea  [ ]Audible excessive secretions   [ ]Rhonchi        [ ]Right [ ]Left [ ]Bilateral  [ ]Crackles        [ ]Right [ ]Left [ ]Bilateral  [ ]Wheezing     [ ]Right [ ]Left [ ]Bilateral  CARDIOVASCULAR:    [ C]Regular [ ]Irregular [ ]Tachy  [ ]Spencer [ ]Murmur [ ]Other  GASTROINTESTINAL:  [C ]Soft  [ ]Distended   [ ]+BS  [C ]Non tender [ ]Tender  [ ]PEG [ ]OGT/ NGT   Last BM:   9/17/18  GENITOURINARY:  [ ]Normal [ ] Incontinent   [ ]Oliguria/Anuria   [ ]Martinez  MUSCULOSKELETAL:   [X ]Normal   [ ]Weakness  [ ]Bed/Wheelchair bound [ ]Edema  NEUROLOGIC:   [ X]No focal deficits  [ ] Cognitive impairment  [ ] Dysphagia [ ]Dysarthria [ ] Paresis [ ]Other   SKIN:   [ X]Normal   [ ]Pressure ulcer(s)  [ ]Rash    CRITICAL CARE:  [ ] Shock Present  [ ]Septic [ ]Cardiogenic [ ]Neurologic [ ]Hypovolemic  [ ]  Vasopressors [ ]  Inotropes   [ ] Respiratory failure present  [ ] Acute  [ ] Chronic [ ] Hypoxic  [ ] Hypercarbic [ ] Other  [ ] Other organ failure     LABS: reviewed                                    11.3   27.1  )-----------( 226      ( 18 Sep 2018 21:35 )             34.6     09-18    138  |  102  |  27<H>  ----------------------------<  163<H>  4.2   |  20<L>  |  0.90    Ca    8.9      18 Sep 2018 21:35  Phos  3.2     09-18  Mg     2.2     09-18    RADIOLOGY & ADDITIONAL STUDIES: CT lumbar spine performed today - results pending    PROTEIN CALORIE MALNUTRITION:   [ ] PPSV2 < or = 30% [ ] significant weight loss [ ] poor nutritional intake [ ] anasarca [ ] catabolic state   Albumin, Serum: 3.5 g/dL (09-12-18 @ 23:02)   Artificial Nutrition [ ]     REFERRALS:   [ ]Chaplaincy  [ ] Hospice  [ ]Child Life  [ ]Social Work  [ ]Case management [ ]Holistic Therapy [ ] Physical Therapy [ ] Dietary   Goals of Care Document:

## 2018-09-19 NOTE — PROGRESS NOTE ADULT - SUBJECTIVE AND OBJECTIVE BOX
Visit Summary: Patient seen and evaluated at bedside.    Overnight Events: none    Exam:  T(C): 36.6 (09-19-18 @ 02:49), Max: 36.6 (09-18-18 @ 07:00)  HR: 74 (09-19-18 @ 02:49) (51 - 77)  BP: 142/80 (09-19-18 @ 02:49) (101/58 - 142/80)  RR: 18 (09-19-18 @ 02:49) (15 - 22)  SpO2: 98% (09-19-18 @ 02:49) (91% - 100%)  Wt(kg): --    PHYSICAL EXAM:    Constitutional: No Acute Distress     Neurological: AOx3, Following Commands    Motor exam:          Upper extremity                         Delt     Bicep     Tricep    HG                                                 R         5/5        5/5        5/5       5/5                                               L          5/5        5/5        5/5       5/5          Lower extremity                        HF         KF        KE       DF         PF                                                  R        4/5        4/5       4/5       5/5         5/5                                               L         1/5        4/5       4/5       5/5          5/5                                                 Sensation: [x] intact to light touch  [] decreased:     No clonus                              11.3   27.1  )-----------( 226      ( 18 Sep 2018 21:35 )             34.6     09-18    138  |  102  |  27<H>  ----------------------------<  163<H>  4.2   |  20<L>  |  0.90    Ca    8.9      18 Sep 2018 21:35  Phos  3.2     09-18  Mg     2.2     09-18

## 2018-09-19 NOTE — PROGRESS NOTE ADULT - PROBLEM SELECTOR PLAN 1
- cause of baseline chronic back pain, worsened now due to L2 lytic lesion.  - s/p T11-L4 fusion, and L2 corpectomy.

## 2018-09-19 NOTE — PROGRESS NOTE ADULT - ASSESSMENT
73F hx HTN, prediabetes, spinal stenosis and RP soft tissue mass now POD#1 s/p L2 anterior corpectomy, T11-4 fusion, and paraspinal muscle flaps and complex closure    - Next aquacel change 9/21  - Cont drains  - Rest of care per primary    Plastic Surgery  [phone]

## 2018-09-19 NOTE — PROGRESS NOTE ADULT - PROBLEM SELECTOR PLAN 1
-likely neoplasm related pain - metastatic bone lesion of L2 causing severe lower back pain   - s/p L2 corpectomy T11-L4 fusion  - On Dilaudid PCA pump, palliative care following for pain control   - Decadron as per neurosurgery

## 2018-09-19 NOTE — PROGRESS NOTE ADULT - PROBLEM SELECTOR PLAN 3
- patient currently on Dilaudid PCA, 0.3mg demand  - will increase to 0.4 demand as patient reporting only mild improvement of pain with current regimen  - add miralax qd to current bowel regimen  - continue to monitor - patient currently on Dilaudid PCA, 0.3mg demand  - will start basal rate of 0.5mg/hr for PCA and increase demand to 0.5 as patient reporting only mild improvement of pain with current regimen  - 1mg rescue clinician bolus   - will change Flexaril 5mg TID to standing   - add miralax qd to current bowel regimen  - continue to monitor - patient currently on Dilaudid PCA, 0.2mg demand (per order, but RN documenting 0.3mg in med rec)  - Based on 24 hour usage (about 7.3mg IV dilaudid total), will start basal rate of 0.5mg/hr (increased daily dose by about 60% for persistent moderate-severe pain) for PCA and increase demand to 0.5 as patient reporting only mild improvement of pain with current regimen. Current PCA: dilaudid [0.5CR/0.5DD/20 mins/2mg per h/1Q1 CAB]  - will change Flexaril 5mg TID to standing   - add miralax qd to current bowel regimen  - continue to monitor

## 2018-09-19 NOTE — PHYSICAL THERAPY INITIAL EVALUATION ADULT - PERTINENT HX OF CURRENT PROBLEM, REHAB EVAL
73F h/o HTN, prediabetes, & chronic back pain 2/2 spinal stenosis, p/w worsening of chronic back pain. Pt reported pain in lower back w/ R sided sciatica since early 2017. She had imaging in 2017 which revealed lumbar spinal stenosis. Pt recently developed worsening lower back pain w/ paresthesia radiating to L thigh. Pt w/ difficulty walking & lay flat d/t pain, she feels like LLE is weaker.

## 2018-09-19 NOTE — PROGRESS NOTE ADULT - PROBLEM SELECTOR PLAN 2
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

## 2018-09-19 NOTE — DIETITIAN INITIAL EVALUATION ADULT. - ENERGY NEEDS
ht: 67 inches. wt: 302 pounds (current as of 9/12, stated). BMI: 47.3 kG/m2. UBW:  IBW: 135 pounds +/- 10%. %IBW: 224%  Other pertinent objective information: 73 year old female pt with PMH HTN, prediabetes, spinal stenosis and RP soft tissue mass now POD#1 s/p L2 anterior corpectomy, T11-4 fusion, and paraspinal muscle flaps and complex closure. Noted with suspected uterine CA, lung and L2 mets per rad oncology. Surgical incision noted. ht: 67 inches. wt: 302 pounds (current as of 9/12, stated). BMI: 47.3 kG/m2. UBW: 342 pounds Jan 2018. IBW: 135 pounds +/- 10%. %IBW: 224%  Other pertinent objective information: 73 year old female pt with PMH HTN, prediabetes, spinal stenosis and RP soft tissue mass now POD#1 s/p L2 anterior corpectomy, T11-4 fusion, and paraspinal muscle flaps and complex closure. Noted with suspected uterine CA, lung and L2 mets per rad oncology. Surgical incision noted.

## 2018-09-20 DIAGNOSIS — N85.9 NONINFLAMMATORY DISORDER OF UTERUS, UNSPECIFIED: ICD-10-CM

## 2018-09-20 LAB
ANION GAP SERPL CALC-SCNC: 9 MMOL/L — SIGNIFICANT CHANGE UP (ref 5–17)
BASOPHILS # BLD AUTO: 0.01 K/UL — SIGNIFICANT CHANGE UP (ref 0–0.2)
BASOPHILS NFR BLD AUTO: 0 % — SIGNIFICANT CHANGE UP (ref 0–2)
BUN SERPL-MCNC: 31 MG/DL — HIGH (ref 7–23)
CALCIUM SERPL-MCNC: 9.3 MG/DL — SIGNIFICANT CHANGE UP (ref 8.4–10.5)
CHLORIDE SERPL-SCNC: 107 MMOL/L — SIGNIFICANT CHANGE UP (ref 96–108)
CO2 SERPL-SCNC: 21 MMOL/L — LOW (ref 22–31)
CREAT SERPL-MCNC: 0.75 MG/DL — SIGNIFICANT CHANGE UP (ref 0.5–1.3)
EOSINOPHIL # BLD AUTO: 0 K/UL — SIGNIFICANT CHANGE UP (ref 0–0.5)
EOSINOPHIL NFR BLD AUTO: 0 % — SIGNIFICANT CHANGE UP (ref 0–6)
GLUCOSE BLDC GLUCOMTR-MCNC: 140 MG/DL — HIGH (ref 70–99)
GLUCOSE BLDC GLUCOMTR-MCNC: 161 MG/DL — HIGH (ref 70–99)
GLUCOSE BLDC GLUCOMTR-MCNC: 167 MG/DL — HIGH (ref 70–99)
GLUCOSE BLDC GLUCOMTR-MCNC: 198 MG/DL — HIGH (ref 70–99)
GLUCOSE SERPL-MCNC: 154 MG/DL — HIGH (ref 70–99)
HCT VFR BLD CALC: 30.5 % — LOW (ref 34.5–45)
HGB BLD-MCNC: 9.5 G/DL — LOW (ref 11.5–15.5)
IMM GRANULOCYTES NFR BLD AUTO: 0.6 % — SIGNIFICANT CHANGE UP (ref 0–1.5)
LYMPHOCYTES # BLD AUTO: 1.04 K/UL — SIGNIFICANT CHANGE UP (ref 1–3.3)
LYMPHOCYTES # BLD AUTO: 4.6 % — LOW (ref 13–44)
MCHC RBC-ENTMCNC: 27.5 PG — SIGNIFICANT CHANGE UP (ref 27–34)
MCHC RBC-ENTMCNC: 31.1 GM/DL — LOW (ref 32–36)
MCV RBC AUTO: 88.4 FL — SIGNIFICANT CHANGE UP (ref 80–100)
MONOCYTES # BLD AUTO: 0.99 K/UL — HIGH (ref 0–0.9)
MONOCYTES NFR BLD AUTO: 4.4 % — SIGNIFICANT CHANGE UP (ref 2–14)
NEUTROPHILS # BLD AUTO: 20.54 K/UL — HIGH (ref 1.8–7.4)
NEUTROPHILS NFR BLD AUTO: 90.4 % — HIGH (ref 43–77)
PLATELET # BLD AUTO: 181 K/UL — SIGNIFICANT CHANGE UP (ref 150–400)
POTASSIUM SERPL-MCNC: 4.4 MMOL/L — SIGNIFICANT CHANGE UP (ref 3.5–5.3)
POTASSIUM SERPL-SCNC: 4.4 MMOL/L — SIGNIFICANT CHANGE UP (ref 3.5–5.3)
RBC # BLD: 3.45 M/UL — LOW (ref 3.8–5.2)
RBC # FLD: 15.6 % — HIGH (ref 10.3–14.5)
SODIUM SERPL-SCNC: 137 MMOL/L — SIGNIFICANT CHANGE UP (ref 135–145)
WBC # BLD: 22.71 K/UL — HIGH (ref 3.8–10.5)
WBC # FLD AUTO: 22.71 K/UL — HIGH (ref 3.8–10.5)

## 2018-09-20 PROCEDURE — 99233 SBSQ HOSP IP/OBS HIGH 50: CPT

## 2018-09-20 PROCEDURE — 99232 SBSQ HOSP IP/OBS MODERATE 35: CPT | Mod: GC

## 2018-09-20 RX ORDER — POLYETHYLENE GLYCOL 3350 17 G/17G
17 POWDER, FOR SOLUTION ORAL EVERY 12 HOURS
Qty: 0 | Refills: 0 | Status: DISCONTINUED | OUTPATIENT
Start: 2018-09-20 | End: 2018-09-26

## 2018-09-20 RX ORDER — SODIUM CHLORIDE 9 MG/ML
1000 INJECTION INTRAMUSCULAR; INTRAVENOUS; SUBCUTANEOUS
Qty: 0 | Refills: 0 | Status: DISCONTINUED | OUTPATIENT
Start: 2018-09-20 | End: 2018-09-22

## 2018-09-20 RX ORDER — NYSTATIN CREAM 100000 [USP'U]/G
1 CREAM TOPICAL
Qty: 0 | Refills: 0 | Status: DISCONTINUED | OUTPATIENT
Start: 2018-09-20 | End: 2018-10-02

## 2018-09-20 RX ORDER — MULTIVIT WITH MIN/MFOLATE/K2 340-15/3 G
300 POWDER (GRAM) ORAL ONCE
Qty: 0 | Refills: 0 | Status: COMPLETED | OUTPATIENT
Start: 2018-09-20 | End: 2018-09-20

## 2018-09-20 RX ADMIN — LOSARTAN POTASSIUM 50 MILLIGRAM(S): 100 TABLET, FILM COATED ORAL at 05:55

## 2018-09-20 RX ADMIN — Medication 100 MILLIGRAM(S): at 13:35

## 2018-09-20 RX ADMIN — Medication 4 MILLIGRAM(S): at 05:55

## 2018-09-20 RX ADMIN — Medication 100 MILLIGRAM(S): at 05:55

## 2018-09-20 RX ADMIN — LACTULOSE 20 GRAM(S): 10 SOLUTION ORAL at 05:55

## 2018-09-20 RX ADMIN — HYDROMORPHONE HYDROCHLORIDE 30 MILLILITER(S): 2 INJECTION INTRAMUSCULAR; INTRAVENOUS; SUBCUTANEOUS at 22:02

## 2018-09-20 RX ADMIN — HYDROMORPHONE HYDROCHLORIDE 30 MILLILITER(S): 2 INJECTION INTRAMUSCULAR; INTRAVENOUS; SUBCUTANEOUS at 06:00

## 2018-09-20 RX ADMIN — HYDROMORPHONE HYDROCHLORIDE 30 MILLILITER(S): 2 INJECTION INTRAMUSCULAR; INTRAVENOUS; SUBCUTANEOUS at 07:39

## 2018-09-20 RX ADMIN — Medication 650 MILLIGRAM(S): at 05:59

## 2018-09-20 RX ADMIN — LACTULOSE 20 GRAM(S): 10 SOLUTION ORAL at 00:40

## 2018-09-20 RX ADMIN — POLYETHYLENE GLYCOL 3350 17 GRAM(S): 17 POWDER, FOR SOLUTION ORAL at 11:54

## 2018-09-20 RX ADMIN — HYDROMORPHONE HYDROCHLORIDE 30 MILLILITER(S): 2 INJECTION INTRAMUSCULAR; INTRAVENOUS; SUBCUTANEOUS at 03:00

## 2018-09-20 RX ADMIN — CYCLOBENZAPRINE HYDROCHLORIDE 5 MILLIGRAM(S): 10 TABLET, FILM COATED ORAL at 22:08

## 2018-09-20 RX ADMIN — SODIUM CHLORIDE 100 MILLILITER(S): 9 INJECTION INTRAMUSCULAR; INTRAVENOUS; SUBCUTANEOUS at 22:04

## 2018-09-20 RX ADMIN — HYDROMORPHONE HYDROCHLORIDE 30 MILLILITER(S): 2 INJECTION INTRAMUSCULAR; INTRAVENOUS; SUBCUTANEOUS at 20:18

## 2018-09-20 RX ADMIN — Medication 25 MILLIGRAM(S): at 22:12

## 2018-09-20 RX ADMIN — Medication 650 MILLIGRAM(S): at 06:29

## 2018-09-20 RX ADMIN — Medication 1: at 09:58

## 2018-09-20 RX ADMIN — Medication 4 MILLIGRAM(S): at 11:54

## 2018-09-20 RX ADMIN — DEXTROSE MONOHYDRATE, SODIUM CHLORIDE, AND POTASSIUM CHLORIDE 100 MILLILITER(S): 50; .745; 4.5 INJECTION, SOLUTION INTRAVENOUS at 03:05

## 2018-09-20 RX ADMIN — Medication 1: at 17:54

## 2018-09-20 RX ADMIN — Medication 300 MILLILITER(S): at 15:05

## 2018-09-20 RX ADMIN — Medication 4 MILLIGRAM(S): at 17:54

## 2018-09-20 RX ADMIN — Medication 4 MILLIGRAM(S): at 00:40

## 2018-09-20 RX ADMIN — NYSTATIN CREAM 1 APPLICATION(S): 100000 CREAM TOPICAL at 17:55

## 2018-09-20 RX ADMIN — LACTULOSE 20 GRAM(S): 10 SOLUTION ORAL at 11:54

## 2018-09-20 RX ADMIN — CYCLOBENZAPRINE HYDROCHLORIDE 5 MILLIGRAM(S): 10 TABLET, FILM COATED ORAL at 13:35

## 2018-09-20 RX ADMIN — HYDROMORPHONE HYDROCHLORIDE 30 MILLILITER(S): 2 INJECTION INTRAMUSCULAR; INTRAVENOUS; SUBCUTANEOUS at 15:02

## 2018-09-20 RX ADMIN — CYCLOBENZAPRINE HYDROCHLORIDE 5 MILLIGRAM(S): 10 TABLET, FILM COATED ORAL at 05:55

## 2018-09-20 RX ADMIN — HYDROMORPHONE HYDROCHLORIDE 30 MILLILITER(S): 2 INJECTION INTRAMUSCULAR; INTRAVENOUS; SUBCUTANEOUS at 11:41

## 2018-09-20 RX ADMIN — ENOXAPARIN SODIUM 40 MILLIGRAM(S): 100 INJECTION SUBCUTANEOUS at 22:08

## 2018-09-20 RX ADMIN — Medication 1: at 13:33

## 2018-09-20 NOTE — PROGRESS NOTE ADULT - ASSESSMENT
73F HTN, pre DM, chronic back pain secondary spinal stenosis is a/w acute on chronic back pain to [facility] and found to have an infiltrating L2 vertebral body mass as well as endometrial mass, large RP soft tissue mass and lung nodules concerning for metastatic disease, patient is s/p IR guided vertebral body mass biopsy on 9/12/18 and is s/p neurosurgical resection of the mass (9/12/18) results consistent with GYN origin    1. Pain secondary cancer with infiltrating L2 vertebral body mass  -appreciate neurosurgical care, patient is s/p resection and will require outpatient radiation, radiation oncology chart note is noted, plan for radiation as outpatient    2. Metastatic endometrial cancer   -patient will need to establish care with medical oncology after discharge, referral to [facility] will be made, for consideration of antineoplastic chemotherapy  -discussed with patient diagnosis today but she requested meeting with her sister present before we discussed further, plan to meet with patient's sister at 11AM tomorrow.    Please call oncology fellow if any questions or concerns    Waqas Dan  PGY-6, Hematology-Oncology Fellow  [phone] ([place]) [phone] ([facility])

## 2018-09-20 NOTE — PROGRESS NOTE ADULT - SUBJECTIVE AND OBJECTIVE BOX
Patient is a 73y old  Female who presents with a chief complaint of L2 lesion (20 Sep 2018 11:17)      SUBJECTIVE / OVERNIGHT EVENTS: Pt c/o spasms in right leg, back pain controlled.     MEDICATIONS  (STANDING):  cyclobenzaprine 5 milliGRAM(s) Oral three times a day  dexamethasone  Injectable 4 milliGRAM(s) IV Push every 6 hours  dextrose 50% Injectable 12.5 Gram(s) IV Push once  dextrose 50% Injectable 25 Gram(s) IV Push once  dextrose 50% Injectable 25 Gram(s) IV Push once  docusate sodium 100 milliGRAM(s) Oral three times a day  enoxaparin Injectable 40 milliGRAM(s) SubCutaneous at bedtime  HYDROmorphone PCA (1 mG/mL) 30 milliLiter(s) PCA Continuous PCA Continuous  influenza   Vaccine 0.5 milliLiter(s) IntraMuscular once  insulin lispro (HumaLOG) corrective regimen sliding scale   SubCutaneous three times a day before meals  insulin lispro (HumaLOG) corrective regimen sliding scale   SubCutaneous at bedtime  lactulose Syrup 20 Gram(s) Oral every 6 hours  losartan 50 milliGRAM(s) Oral daily  nystatin Powder 1 Application(s) Topical two times a day  polyethylene glycol 3350 17 Gram(s) Oral daily  senna 2 Tablet(s) Oral at bedtime  sodium chloride 0.9% with potassium chloride 20 mEq/L 1000 milliLiter(s) (100 mL/Hr) IV Continuous <Continuous>    MEDICATIONS  (PRN):  acetaminophen   Tablet .. 650 milliGRAM(s) Oral every 6 hours PRN Temp greater or equal to 38C (100.4F), Mild Pain (1 - 3)  dextrose 40% Gel 15 Gram(s) Oral once PRN Blood Glucose LESS THAN 70 milliGRAM(s)/deciliter  diphenhydrAMINE   Capsule 25 milliGRAM(s) Oral every 24 hours PRN Insomnia  glucagon  Injectable 1 milliGRAM(s) IntraMuscular once PRN Glucose LESS THAN 70 milligrams/deciliter  HYDROmorphone PCA (1 mG/mL) Rescue Clinician Bolus 1 milliGRAM(s) IV Push every 1 hour PRN for pain unrelieved with PCA demand dose  naloxone Injectable 0.1 milliGRAM(s) IV Push every 3 minutes PRN For ANY of the following changes in patient status:  A. RR LESS THAN 10 breaths per minute, B. Oxygen saturation LESS THAN 90%, C. Sedation score of 6  ondansetron Injectable 4 milliGRAM(s) IV Push every 6 hours PRN Nausea and/or Vomiting      Vital Signs Last 24 Hrs  T(C): 36.4 (20 Sep 2018 09:00), Max: 36.9 (19 Sep 2018 22:18)  T(F): 97.5 (20 Sep 2018 09:00), Max: 98.4 (19 Sep 2018 22:18)  HR: 85 (20 Sep 2018 11:36) (65 - 88)  BP: 141/75 (20 Sep 2018 11:36) (115/71 - 151/80)  BP(mean): --  RR: 18 (20 Sep 2018 09:00) (18 - 18)  SpO2: 98% (20 Sep 2018 11:36) (96% - 100%)  CAPILLARY BLOOD GLUCOSE      POCT Blood Glucose.: 198 mg/dL (20 Sep 2018 13:13)  POCT Blood Glucose.: 167 mg/dL (20 Sep 2018 09:41)  POCT Blood Glucose.: 257 mg/dL (19 Sep 2018 22:34)  POCT Blood Glucose.: 146 mg/dL (19 Sep 2018 14:21)    I&O's Summary    19 Sep 2018 07:01  -  20 Sep 2018 07:00  --------------------------------------------------------  IN: 2919 mL / OUT: 2180 mL / NET: 739 mL    20 Sep 2018 07:01  -  20 Sep 2018 13:45  --------------------------------------------------------  IN: 100 mL / OUT: 0 mL / NET: 100 mL        PHYSICAL EXAM:  GENERAL: NAD  HEAD:  Atraumatic, Normocephalic  EYES: EOMI, PERRLA, conjunctiva and sclera clear  NECK: Supple, No JVD  CHEST/LUNG: Clear to auscultation bilaterally; No wheeze  HEART: Regular rate and rhythm; S1S2  ABDOMEN: Soft, Nontender, obese; Bowel sounds present  EXTREMITIES:  2+ Peripheral Pulses, No clubbing, cyanosis, +LE edema L>R  PSYCH: AAOx3  NEUROLOGY: CN's intact, LE weakness   SKIN: No lesions, leg erythema significantly improved     LABS:                        9.5    22.71 )-----------( 181      ( 20 Sep 2018 07:45 )             30.5     09-20    137  |  107  |  31<H>  ----------------------------<  154<H>  4.4   |  21<L>  |  0.75    Ca    9.3      20 Sep 2018 06:53                RADIOLOGY & ADDITIONAL TESTS:    Imaging Personally Reviewed:    Consultant(s) Notes Reviewed:      Care Discussed with Consultants/Other Providers:

## 2018-09-20 NOTE — CONSULT NOTE ADULT - ASSESSMENT
73yoF postmenopausal  h/o HTN, prediabetes and chronic back pain 2/2 spinal stenosis initially p/w worsening of chronic back pain at [facility] and found to have uterine mass and spinal mass on imaging s/p L2 corpectomy, T11-L4 fusion and exesion of the mass on 18 and paraspinal muscle flaps and complex closure with plastics. Gyn/onc consulted regarding uterine mass in the setting of biopsy proven metastatic adenocarcinoma of mullerian origin.

## 2018-09-20 NOTE — PROGRESS NOTE ADULT - SUBJECTIVE AND OBJECTIVE BOX
GAP TEAM PALLIATIVE CARE UNIT PROGRESS NOTE:      [  ] Patient on hospice program.      INTERVAL HPI/OVERNIGHT EVENTS:  Patient has used a total of 6 demand doses totaling 3mg of IV dilaudid in addition to the basal rate.     DNR on chart:   Allergies    No Known Allergies    Intolerances    MEDICATIONS  (STANDING):  cyclobenzaprine 5 milliGRAM(s) Oral three times a day  dexamethasone  Injectable 4 milliGRAM(s) IV Push every 6 hours  dextrose 50% Injectable 12.5 Gram(s) IV Push once  dextrose 50% Injectable 25 Gram(s) IV Push once  dextrose 50% Injectable 25 Gram(s) IV Push once  docusate sodium 100 milliGRAM(s) Oral three times a day  enoxaparin Injectable 40 milliGRAM(s) SubCutaneous at bedtime  HYDROmorphone PCA (1 mG/mL) 30 milliLiter(s) PCA Continuous PCA Continuous  influenza   Vaccine 0.5 milliLiter(s) IntraMuscular once  insulin lispro (HumaLOG) corrective regimen sliding scale   SubCutaneous three times a day before meals  insulin lispro (HumaLOG) corrective regimen sliding scale   SubCutaneous at bedtime  lactulose Syrup 20 Gram(s) Oral every 6 hours  losartan 50 milliGRAM(s) Oral daily  polyethylene glycol 3350 17 Gram(s) Oral daily  senna 2 Tablet(s) Oral at bedtime  sodium chloride 0.9% with potassium chloride 20 mEq/L 1000 milliLiter(s) (100 mL/Hr) IV Continuous <Continuous>    MEDICATIONS  (PRN):  acetaminophen   Tablet .. 650 milliGRAM(s) Oral every 6 hours PRN Temp greater or equal to 38C (100.4F), Mild Pain (1 - 3)  dextrose 40% Gel 15 Gram(s) Oral once PRN Blood Glucose LESS THAN 70 milliGRAM(s)/deciliter  diphenhydrAMINE   Capsule 25 milliGRAM(s) Oral every 24 hours PRN Insomnia  glucagon  Injectable 1 milliGRAM(s) IntraMuscular once PRN Glucose LESS THAN 70 milligrams/deciliter  HYDROmorphone PCA (1 mG/mL) Rescue Clinician Bolus 1 milliGRAM(s) IV Push every 1 hour PRN for pain unrelieved with PCA demand dose  naloxone Injectable 0.1 milliGRAM(s) IV Push every 3 minutes PRN For ANY of the following changes in patient status:  A. RR LESS THAN 10 breaths per minute, B. Oxygen saturation LESS THAN 90%, C. Sedation score of 6  ondansetron Injectable 4 milliGRAM(s) IV Push every 6 hours PRN Nausea and/or Vomiting    ITEMS UNCHECKED ARE NOT PRESENT    PRESENT SYMPTOMS: [ ]Unable to obtain due to poor mentation   Source if other than patient:  [ ]Family   [ ]Team     Pain (Impact on QOL):    Location:       Minimal acceptable level (0-10 scale):                    Aggravating factors:  Quality:  Radiation:  Severity (0-10 scale):     Dyspnea:                           [ ]Mild [ ]Moderate [ ]Severe  Anxiety:                             [ ]Mild [ ]Moderate [ ]Severe  Fatigue:                             [ ]Mild [ ]Moderate [ ]Severe  Nausea:                             [ ]Mild [ ]Moderate [ ]Severe  Loss of appetite:              [ ]Mild [ ]Moderate [ ]Severe  Constipation:                    [ ]Mild [ ]Moderate [ ]Severe    PAINAD Score:    [email or web address] (Ctrl +  left click to view)  		  Other Symptoms:  [ ]All other review of systems negative     Karnofsky Performance Score/Palliative Performance Status Version 2:         %        http://palliative.info/resource_material/[email or web address].pdf  PHYSICAL EXAM:  Vital Signs Last 24 Hrs  T(C): 36.4 (20 Sep 2018 09:00), Max: 36.9 (19 Sep 2018 22:18)  T(F): 97.5 (20 Sep 2018 09:00), Max: 98.4 (19 Sep 2018 22:18)  HR: 65 (20 Sep 2018 09:00) (65 - 88)  BP: 121/53 (20 Sep 2018 09:00) (115/71 - 151/80)  BP(mean): --  RR: 18 (20 Sep 2018 09:00) (18 - 18)  SpO2: 100% (20 Sep 2018 09:00) (96% - 100%) I&O's Summary    19 Sep 2018 07:01  -  20 Sep 2018 07:00  --------------------------------------------------------  IN: 2919 mL / OUT: 2180 mL / NET: 739 mL    GENERAL:  [X ]Alert  [X ]Oriented x 3  [ ]Lethargic  [ ]Cachexia  [ ]Unarousable  [ ]Verbal  [ ]Non-Verbal  Behavioral:   [ X] Anxiety  [ ] Delirium [ ] Agitation [ ] Other  HEENT:  [ ]Normal   [ ]Dry mouth   [ ]ET Tube/Trach  [ ]Oral lesions  PULMONARY:   [C ]Clear [ ]Tachypnea  [ ]Audible excessive secretions   [ ]Rhonchi        [ ]Right [ ]Left [ ]Bilateral  [ ]Crackles        [ ]Right [ ]Left [ ]Bilateral  [ ]Wheezing     [ ]Right [ ]Left [ ]Bilateral  CARDIOVASCULAR:    [C ]Regular [ ]Irregular [ ]Tachy  [ ]Spencer [ ]Murmur [ ]Other  GASTROINTESTINAL:  [C ]Soft  [ ]Distended   [C ]+BS  [ C]Non tender [ ]Tender  [ ]PEG [ ]OGT/ NGT   Last BM:   9/17/18  GENITOURINARY:  [ ]Normal [ ] Incontinent   [ ]Oliguria/Anuria   [ ]Martinez  MUSCULOSKELETAL:   [ X]Normal   [ ]Weakness  [ ]Bed/Wheelchair bound [ ]Edema  NEUROLOGIC:   [ ]No focal deficits  [ ] Cognitive impairment  [ ] Dysphagia [ ]Dysarthria [ ] Paresis [ ]Other   SKIN:   [ ]Normal   [ ]Pressure ulcer(s)  [ ]Rash    CRITICAL CARE:  [ ] Shock Present  [ ]Septic [ ]Cardiogenic [ ]Neurologic [ ]Hypovolemic  [ ]  Vasopressors [ ]  Inotropes   [ ] Respiratory failure present  [ ] Acute  [ ] Chronic [ ] Hypoxic  [ ] Hypercarbic [ ] Other  [ ] Other organ failure     LABS:                        9.5    22.71 )-----------( 181      ( 20 Sep 2018 07:45 )             30.5   09-20    137  |  107  |  31<H>  ----------------------------<  154<H>  4.4   |  21<L>  |  0.75    Ca    9.3      20 Sep 2018 06:53          RADIOLOGY & ADDITIONAL STUDIES:    PROTEIN CALORIE MALNUTRITION:   [ ] PPSV2 < or = 30% [ ] significant weight loss [ ] poor nutritional intake [ ] anasarca [ ] catabolic state   Albumin, Serum: 3.5 g/dL (09-12-18 @ 23:02)   Artificial Nutrition [ ]     REFERRALS:   [ ]Chaplaincy  [ ] Hospice  [ ]Child Life  [ ]Social Work  [ ]Case management [ ]Holistic Therapy [ ] Physical Therapy [ ] Dietary   Goals of Care Document: GAP TEAM PALLIATIVE CARE UNIT PROGRESS NOTE:      [  ] Patient on hospice program.      INTERVAL HPI/OVERNIGHT EVENTS:  Patient today seen while sitting in chair. Patient endorsing 7-8/10 pain at this time, however, reporting that her pain is overall improved compared to yesterday. Patient has used a total of 6 demand doses totaling 3mg of IV dilaudid in addition to the basal rate.     DNR on chart:   Allergies    No Known Allergies    Intolerances    MEDICATIONS  (STANDING):  cyclobenzaprine 5 milliGRAM(s) Oral three times a day  dexamethasone  Injectable 4 milliGRAM(s) IV Push every 6 hours  dextrose 50% Injectable 12.5 Gram(s) IV Push once  dextrose 50% Injectable 25 Gram(s) IV Push once  dextrose 50% Injectable 25 Gram(s) IV Push once  docusate sodium 100 milliGRAM(s) Oral three times a day  enoxaparin Injectable 40 milliGRAM(s) SubCutaneous at bedtime  HYDROmorphone PCA (1 mG/mL) 30 milliLiter(s) PCA Continuous PCA Continuous  influenza   Vaccine 0.5 milliLiter(s) IntraMuscular once  insulin lispro (HumaLOG) corrective regimen sliding scale   SubCutaneous three times a day before meals  insulin lispro (HumaLOG) corrective regimen sliding scale   SubCutaneous at bedtime  lactulose Syrup 20 Gram(s) Oral every 6 hours  losartan 50 milliGRAM(s) Oral daily  polyethylene glycol 3350 17 Gram(s) Oral daily  senna 2 Tablet(s) Oral at bedtime  sodium chloride 0.9% with potassium chloride 20 mEq/L 1000 milliLiter(s) (100 mL/Hr) IV Continuous <Continuous>    MEDICATIONS  (PRN):  acetaminophen   Tablet .. 650 milliGRAM(s) Oral every 6 hours PRN Temp greater or equal to 38C (100.4F), Mild Pain (1 - 3)  dextrose 40% Gel 15 Gram(s) Oral once PRN Blood Glucose LESS THAN 70 milliGRAM(s)/deciliter  diphenhydrAMINE   Capsule 25 milliGRAM(s) Oral every 24 hours PRN Insomnia  glucagon  Injectable 1 milliGRAM(s) IntraMuscular once PRN Glucose LESS THAN 70 milligrams/deciliter  HYDROmorphone PCA (1 mG/mL) Rescue Clinician Bolus 1 milliGRAM(s) IV Push every 1 hour PRN for pain unrelieved with PCA demand dose  naloxone Injectable 0.1 milliGRAM(s) IV Push every 3 minutes PRN For ANY of the following changes in patient status:  A. RR LESS THAN 10 breaths per minute, B. Oxygen saturation LESS THAN 90%, C. Sedation score of 6  ondansetron Injectable 4 milliGRAM(s) IV Push every 6 hours PRN Nausea and/or Vomiting    ITEMS UNCHECKED ARE NOT PRESENT    PRESENT SYMPTOMS: [ ]Unable to obtain due to poor mentation   Source if other than patient:  [ ]Family   [ ]Team     Pain (Impact on QOL):    Location:       Minimal acceptable level (0-10 scale):                    Aggravating factors:  Quality:  Radiation:  Severity (0-10 scale):     Dyspnea:                           [ ]Mild [ ]Moderate [ ]Severe  Anxiety:                             [ ]Mild [ ]Moderate [ ]Severe  Fatigue:                             [ ]Mild [ ]Moderate [ ]Severe  Nausea:                             [ ]Mild [ ]Moderate [ ]Severe  Loss of appetite:              [ ]Mild [ ]Moderate [ ]Severe  Constipation:                    [ ]Mild [ ]Moderate [ ]Severe    PAINAD Score:    [email or web address] (Ctrl +  left click to view)  		  Other Symptoms:  [ ]All other review of systems negative     Karnofsky Performance Score/Palliative Performance Status Version 2:         %        http://palliative.info/resource_material/PPSv2.pdf  PHYSICAL EXAM:  Vital Signs Last 24 Hrs  T(C): 36.4 (20 Sep 2018 09:00), Max: 36.9 (19 Sep 2018 22:18)  T(F): 97.5 (20 Sep 2018 09:00), Max: 98.4 (19 Sep 2018 22:18)  HR: 65 (20 Sep 2018 09:00) (65 - 88)  BP: 121/53 (20 Sep 2018 09:00) (115/71 - 151/80)  BP(mean): --  RR: 18 (20 Sep 2018 09:00) (18 - 18)  SpO2: 100% (20 Sep 2018 09:00) (96% - 100%) I&O's Summary    19 Sep 2018 07:01  -  20 Sep 2018 07:00  --------------------------------------------------------  IN: 2919 mL / OUT: 2180 mL / NET: 739 mL    GENERAL:  [X ]Alert  [X ]Oriented x 3  [ ]Lethargic  [ ]Cachexia  [ ]Unarousable  [ ]Verbal  [ ]Non-Verbal  Behavioral:   [ X] Anxiety  [ ] Delirium [ ] Agitation [ ] Other  HEENT:  [ ]Normal   [ ]Dry mouth   [ ]ET Tube/Trach  [ ]Oral lesions  PULMONARY:   [X ]Clear [ ]Tachypnea  [ ]Audible excessive secretions   [ ]Rhonchi        [ ]Right [ ]Left [ ]Bilateral  [ ]Crackles        [ ]Right [ ]Left [ ]Bilateral  [ ]Wheezing     [ ]Right [ ]Left [ ]Bilateral  CARDIOVASCULAR:    [X ]Regular [ ]Irregular [ ]Tachy  [ ]Spencer [ ]Murmur [ ]Other  GASTROINTESTINAL:  [X ]Soft  [ ]Distended   [X ]+BS  [ X]Non tender [ ]Tender  [ ]PEG [ ]OGT/ NGT   Last BM:   9/16/18  GENITOURINARY:  [ ]Normal [ ] Incontinent   [ ]Oliguria/Anuria   [ ]Martinez  MUSCULOSKELETAL:   [ X]Normal   [ ]Weakness  [ ]Bed/Wheelchair bound [ ]Edema  NEUROLOGIC:   [ ]No focal deficits  [ ] Cognitive impairment  [ ] Dysphagia [ ]Dysarthria [ ] Paresis [ ]Other   SKIN:   [ ]Normal   [ ]Pressure ulcer(s)  [ ]Rash    CRITICAL CARE:  [ ] Shock Present  [ ]Septic [ ]Cardiogenic [ ]Neurologic [ ]Hypovolemic  [ ]  Vasopressors [ ]  Inotropes   [ ] Respiratory failure present  [ ] Acute  [ ] Chronic [ ] Hypoxic  [ ] Hypercarbic [ ] Other  [ ] Other organ failure     LABS:                        9.5    22.71 )-----------( 181      ( 20 Sep 2018 07:45 )             30.5   09-20    137  |  107  |  31<H>  ----------------------------<  154<H>  4.4   |  21<L>  |  0.75    Ca    9.3      20 Sep 2018 06:53          RADIOLOGY & ADDITIONAL STUDIES:    PROTEIN CALORIE MALNUTRITION:   [ ] PPSV2 < or = 30% [ ] significant weight loss [ ] poor nutritional intake [ ] anasarca [ ] catabolic state   Albumin, Serum: 3.5 g/dL (09-12-18 @ 23:02)   Artificial Nutrition [ ]     REFERRALS:   [ ]Chaplaincy  [ ] Hospice  [ ]Child Life  [ ]Social Work  [ ]Case management [ ]Holistic Therapy [ ] Physical Therapy [ ] Dietary   Goals of Care Document: GAP TEAM PALLIATIVE CARE PROGRESS NOTE:     INTERVAL HPI/OVERNIGHT EVENTS: Patient today seen while sitting in chair. Patient endorsing 7-8/10 pain at this time, however, reporting that her pain is overall improved compared to yesterday. Patient has used a total of 6 demand doses totaling 3mg of IV dilaudid in addition to the basal rate.     DNR on chart:   Allergies    No Known Allergies    Intolerances    MEDICATIONS  (STANDING):  cyclobenzaprine 5 milliGRAM(s) Oral three times a day  dexamethasone  Injectable 4 milliGRAM(s) IV Push every 6 hours  dextrose 50% Injectable 12.5 Gram(s) IV Push once  dextrose 50% Injectable 25 Gram(s) IV Push once  dextrose 50% Injectable 25 Gram(s) IV Push once  docusate sodium 100 milliGRAM(s) Oral three times a day  enoxaparin Injectable 40 milliGRAM(s) SubCutaneous at bedtime  HYDROmorphone PCA (1 mG/mL) 30 milliLiter(s) PCA Continuous PCA Continuous  influenza   Vaccine 0.5 milliLiter(s) IntraMuscular once  insulin lispro (HumaLOG) corrective regimen sliding scale   SubCutaneous three times a day before meals  insulin lispro (HumaLOG) corrective regimen sliding scale   SubCutaneous at bedtime  lactulose Syrup 20 Gram(s) Oral every 6 hours  losartan 50 milliGRAM(s) Oral daily  nystatin Powder 1 Application(s) Topical two times a day  polyethylene glycol 3350 17 Gram(s) Oral every 12 hours  senna 2 Tablet(s) Oral at bedtime  sodium chloride 0.9%. 1000 milliLiter(s) (100 mL/Hr) IV Continuous <Continuous>    MEDICATIONS  (PRN):  acetaminophen   Tablet .. 650 milliGRAM(s) Oral every 6 hours PRN Temp greater or equal to 38C (100.4F), Mild Pain (1 - 3)  dextrose 40% Gel 15 Gram(s) Oral once PRN Blood Glucose LESS THAN 70 milliGRAM(s)/deciliter  diphenhydrAMINE   Capsule 25 milliGRAM(s) Oral every 24 hours PRN Insomnia  glucagon  Injectable 1 milliGRAM(s) IntraMuscular once PRN Glucose LESS THAN 70 milligrams/deciliter  HYDROmorphone PCA (1 mG/mL) Rescue Clinician Bolus 1 milliGRAM(s) IV Push every 1 hour PRN for pain unrelieved with PCA demand dose  naloxone Injectable 0.1 milliGRAM(s) IV Push every 3 minutes PRN For ANY of the following changes in patient status:  A. RR LESS THAN 10 breaths per minute, B. Oxygen saturation LESS THAN 90%, C. Sedation score of 6  ondansetron Injectable 4 milliGRAM(s) IV Push every 6 hours PRN Nausea and/or Vomiting    ITEMS UNCHECKED ARE NOT PRESENT    PRESENT SYMPTOMS: [ ]Unable to obtain due to poor mentation   Source if other than patient:  [ ]Family   [ ]Team     Pain (Impact on QOL):    Location:       Minimal acceptable level (0-10 scale):                    Aggravating factors:  Quality:  Radiation:  Severity (0-10 scale):     Dyspnea:                           [ ]Mild [ ]Moderate [ ]Severe  Anxiety:                             [ ]Mild [ ]Moderate [ ]Severe  Fatigue:                             [ ]Mild [ ]Moderate [ ]Severe  Nausea:                             [ ]Mild [ ]Moderate [ ]Severe  Loss of appetite:              [ ]Mild [ ]Moderate [ ]Severe  Constipation:                    [ ]Mild [ ]Moderate [ ]Severe    PAINAD Score:    [email or web address] (Ctrl +  left click to view)  		  Other Symptoms:  [ ]All other review of systems negative     Karnofsky Performance Score/Palliative Performance Status Version 2:         %        http://palliative.info/resource_material/PPSv2.pdf    PHYSICAL EXAM:  Vital Signs Last 24 Hrs  T(C): 36.3 (20 Sep 2018 14:00), Max: 36.9 (19 Sep 2018 22:18)  T(F): 97.4 (20 Sep 2018 14:00), Max: 98.4 (19 Sep 2018 22:18)  HR: 77 (20 Sep 2018 14:00) (65 - 87)  BP: 106/66 (20 Sep 2018 14:00) (106/66 - 151/80)  BP(mean): --  RR: 18 (20 Sep 2018 14:00) (18 - 18)  SpO2: 100% (20 Sep 2018 14:00) (96% - 100%)    GENERAL:  [X ]Alert  [X ]Oriented x 3  [ ]Lethargic  [ ]Cachexia  [ ]Unarousable  [ ]Verbal  [ ]Non-Verbal  Behavioral:   [ X] Anxiety  [ ] Delirium [ ] Agitation [ ] Other  HEENT:  [ ]Normal   [ ]Dry mouth   [ ]ET Tube/Trach  [ ]Oral lesions  PULMONARY:   [X ]Clear [ ]Tachypnea  [ ]Audible excessive secretions   [ ]Rhonchi        [ ]Right [ ]Left [ ]Bilateral  [ ]Crackles        [ ]Right [ ]Left [ ]Bilateral  [ ]Wheezing     [ ]Right [ ]Left [ ]Bilateral  CARDIOVASCULAR:    [X ]Regular [ ]Irregular [ ]Tachy  [ ]Spencer [ ]Murmur [ ]Other  GASTROINTESTINAL:  [X ]Soft  [ ]Distended   [X ]+BS  [ X]Non tender [ ]Tender  [ ]PEG [ ]OGT/ NGT   Last BM:   9/16/18  GENITOURINARY:  [ ]Normal [ ] Incontinent   [ ]Oliguria/Anuria   [ ]Martinez  MUSCULOSKELETAL:   [ X]Normal   [ ]Weakness  [ ]Bed/Wheelchair bound [ ]Edema  NEUROLOGIC:   [ ]No focal deficits  [ ] Cognitive impairment  [ ] Dysphagia [ ]Dysarthria [ ] Paresis [ ]Other   SKIN:   [ ]Normal   [ ]Pressure ulcer(s)  [ ]Rash    CRITICAL CARE:  [ ] Shock Present  [ ]Septic [ ]Cardiogenic [ ]Neurologic [ ]Hypovolemic  [ ]  Vasopressors [ ]  Inotropes   [ ] Respiratory failure present  [ ] Acute  [ ] Chronic [ ] Hypoxic  [ ] Hypercarbic [ ] Other  [ ] Other organ failure     LABS:                          9.5    22.71 )-----------( 181      ( 20 Sep 2018 07:45 )             30.5     09-20    137  |  107  |  31<H>  ----------------------------<  154<H>  4.4   |  21<L>  |  0.75    Ca    9.3      20 Sep 2018 06:53          RADIOLOGY & ADDITIONAL STUDIES:    PROTEIN CALORIE MALNUTRITION:   [ ] PPSV2 < or = 30% [ ] significant weight loss [ ] poor nutritional intake [ ] anasarca [ ] catabolic state   Albumin, Serum: 3.5 g/dL (09-12-18 @ 23:02)   Artificial Nutrition [ ]     REFERRALS:   [ ]Chaplaincy  [ ] Hospice  [ ]Child Life  [ ]Social Work  [ ]Case management [ ]Holistic Therapy [ ] Physical Therapy [ ] Dietary   Goals of Care Document:

## 2018-09-20 NOTE — PROGRESS NOTE ADULT - PROBLEM SELECTOR PLAN 4
- Will continue to follow for pain On lactulose Q6, also added Mg citrate and miralax was changed to BID dosing. Last BM 4 days ago

## 2018-09-20 NOTE — PROGRESS NOTE ADULT - SUBJECTIVE AND OBJECTIVE BOX
Plastic Surgery Progress Note (pg [facility]: [id number], [facility]: [phone])    SUBJECTIVE:  Doing OK; pain is poorly controlled with PCA.     OBJECTIVE:     ** VITAL SIGNS / I&O's **    Vital Signs Last 24 Hrs  T(C): 36.6 (20 Sep 2018 05:53), Max: 36.9 (19 Sep 2018 22:18)  T(F): 97.9 (20 Sep 2018 05:53), Max: 98.4 (19 Sep 2018 22:18)  HR: 84 (20 Sep 2018 05:53) (70 - 88)  BP: 151/80 (20 Sep 2018 05:53) (115/71 - 151/80)  BP(mean): --  RR: 18 (20 Sep 2018 05:53) (18 - 18)  SpO2: 98% (20 Sep 2018 05:53) (96% - 98%)      19 Sep 2018 07:01  -  20 Sep 2018 07:00  --------------------------------------------------------  IN:    IV PiggyBack: 50 mL    Oral Fluid: 880 mL    sodium chloride 0.9% with potassium chloride 20 mEq/L: 1989 mL  Total IN: 2919 mL    OUT:    Drain: 440 mL    Drain: 40 mL    Indwelling Catheter - Urethral: 1700 mL  Total OUT: 2180 mL    Total NET: 739 mL          ** PHYSICAL EXAM **    -- CONSTITUTIONAL: AOx3. NAD.   -- BACK: Dressings c/d/i, no collections appreciated, HVs ss.      **MEDS**  acetaminophen   Tablet .. 650 milliGRAM(s) Oral every 6 hours PRN  cyclobenzaprine 5 milliGRAM(s) Oral three times a day  dexamethasone  Injectable 4 milliGRAM(s) IV Push every 6 hours  dextrose 40% Gel 15 Gram(s) Oral once PRN  dextrose 50% Injectable 12.5 Gram(s) IV Push once  dextrose 50% Injectable 25 Gram(s) IV Push once  dextrose 50% Injectable 25 Gram(s) IV Push once  diphenhydrAMINE   Capsule 25 milliGRAM(s) Oral every 24 hours PRN  docusate sodium 100 milliGRAM(s) Oral three times a day  enoxaparin Injectable 40 milliGRAM(s) SubCutaneous at bedtime  glucagon  Injectable 1 milliGRAM(s) IntraMuscular once PRN  HYDROmorphone PCA (1 mG/mL) 30 milliLiter(s) PCA Continuous PCA Continuous  HYDROmorphone PCA (1 mG/mL) Rescue Clinician Bolus 1 milliGRAM(s) IV Push every 1 hour PRN  influenza   Vaccine 0.5 milliLiter(s) IntraMuscular once  insulin lispro (HumaLOG) corrective regimen sliding scale   SubCutaneous three times a day before meals  insulin lispro (HumaLOG) corrective regimen sliding scale   SubCutaneous at bedtime  lactulose Syrup 20 Gram(s) Oral every 6 hours  losartan 50 milliGRAM(s) Oral daily  naloxone Injectable 0.1 milliGRAM(s) IV Push every 3 minutes PRN  ondansetron Injectable 4 milliGRAM(s) IV Push every 6 hours PRN  polyethylene glycol 3350 17 Gram(s) Oral daily  senna 2 Tablet(s) Oral at bedtime  sodium chloride 0.9% with potassium chloride 20 mEq/L 1000 milliLiter(s) IV Continuous <Continuous>      ** LABS **                          11.3   27.1  )-----------( 226      ( 18 Sep 2018 21:35 )             34.6     18 Sep 2018 21:35    138    |  102    |  27     ----------------------------<  163    4.2     |  20     |  0.90     Ca    8.9        18 Sep 2018 21:35        CAPILLARY BLOOD GLUCOSE      POCT Blood Glucose.: 257 mg/dL (19 Sep 2018 22:34)  POCT Blood Glucose.: 146 mg/dL (19 Sep 2018 14:21)  POCT Blood Glucose.: 170 mg/dL (19 Sep 2018 09:03)

## 2018-09-20 NOTE — PROGRESS NOTE ADULT - ASSESSMENT
HPI:  Patient is a 73 year old female with chronic back pain which progressively worsened.  Began to develop parasthesias to the leg thigh as well as left leg weakness.  MRI was done which showed infiltrative process at L@ vertebral body.  CT scan then done which showed possible uterine cancer with mets.  Now presented to ED for increased severity of pain.      PROCEDURE: s/p angiogram and embolization of L2 lesion on 9/17/2018, now s/p L2 corpectomy and T11-L4 posterior instrumentation and fusion on 9/18/2018  PAD#3, POD#2    PLAN:  -dilaudid pca for pain control, management as per palliative care  -continue iv fluids, normal saline @ 100 cc/hr  -flexeril for muscle spasms  -decadron 4q6  -senna, colace, miralax, lactulose, and magnesium citrate for bowel regimen  -lovenox and SCDs for DVT prophylaxis  -HISS for glucose control  -consistent carbohydrate diet  -out of bed with assistance  -incentive spirometer for lung expansion  -pt - acute rehab upon discharge  -am labs    Spectra #[id number]

## 2018-09-20 NOTE — PROGRESS NOTE ADULT - SUBJECTIVE AND OBJECTIVE BOX
INTERVAL HPI/OVERNIGHT EVENTS:  Patient S&E at bedside. No o/n events, patient resting comfortably.    VITAL SIGNS:  T(F): 97.4 (09-20-18 @ 14:00)  HR: 77 (09-20-18 @ 14:00)  BP: 106/66 (09-20-18 @ 14:00)  RR: 18 (09-20-18 @ 14:00)  SpO2: 100% (09-20-18 @ 14:00)  Wt(kg): --    PHYSICAL EXAM:  Constitutional: NAD  Eyes: EOMI, sclera non-icteric  Neck: supple, no masses, no JVD  Respiratory: CTA b/l, good air entry b/l  Cardiovascular: RRR, no M/R/G  Gastrointestinal: soft, NTND, no masses palpable, + BS  Extremities: no c/c/e; In brace    MEDICATIONS  (STANDING):  cyclobenzaprine 5 milliGRAM(s) Oral three times a day  dexamethasone  Injectable 4 milliGRAM(s) IV Push every 6 hours  dextrose 50% Injectable 12.5 Gram(s) IV Push once  dextrose 50% Injectable 25 Gram(s) IV Push once  dextrose 50% Injectable 25 Gram(s) IV Push once  docusate sodium 100 milliGRAM(s) Oral three times a day  enoxaparin Injectable 40 milliGRAM(s) SubCutaneous at bedtime  HYDROmorphone PCA (1 mG/mL) 30 milliLiter(s) PCA Continuous PCA Continuous  influenza   Vaccine 0.5 milliLiter(s) IntraMuscular once  insulin lispro (HumaLOG) corrective regimen sliding scale   SubCutaneous three times a day before meals  insulin lispro (HumaLOG) corrective regimen sliding scale   SubCutaneous at bedtime  lactulose Syrup 20 Gram(s) Oral every 6 hours  losartan 50 milliGRAM(s) Oral daily  nystatin Powder 1 Application(s) Topical two times a day  polyethylene glycol 3350 17 Gram(s) Oral every 12 hours  senna 2 Tablet(s) Oral at bedtime  sodium chloride 0.9%. 1000 milliLiter(s) (100 mL/Hr) IV Continuous <Continuous>    MEDICATIONS  (PRN):  acetaminophen   Tablet .. 650 milliGRAM(s) Oral every 6 hours PRN Temp greater or equal to 38C (100.4F), Mild Pain (1 - 3)  dextrose 40% Gel 15 Gram(s) Oral once PRN Blood Glucose LESS THAN 70 milliGRAM(s)/deciliter  diphenhydrAMINE   Capsule 25 milliGRAM(s) Oral every 24 hours PRN Insomnia  glucagon  Injectable 1 milliGRAM(s) IntraMuscular once PRN Glucose LESS THAN 70 milligrams/deciliter  HYDROmorphone PCA (1 mG/mL) Rescue Clinician Bolus 1 milliGRAM(s) IV Push every 1 hour PRN for pain unrelieved with PCA demand dose  naloxone Injectable 0.1 milliGRAM(s) IV Push every 3 minutes PRN For ANY of the following changes in patient status:  A. RR LESS THAN 10 breaths per minute, B. Oxygen saturation LESS THAN 90%, C. Sedation score of 6  ondansetron Injectable 4 milliGRAM(s) IV Push every 6 hours PRN Nausea and/or Vomiting    Allergies  No Known Allergies    LABS:                        9.5    22.71 )-----------( 181      ( 20 Sep 2018 07:45 )             30.5     09-20    137  |  107  |  31<H>  ----------------------------<  154<H>  4.4   |  21<L>  |  0.75    Ca    9.3      20 Sep 2018 06:53    < from: MR Lumbar Spine w/wo IV Cont (09.13.18 @ 17:49) >  IMPRESSION:  Expansile metastatic infiltration of vertebral body of L2 which is   destroyed with associated large left paraspinal and epidural soft tissue   component extending into the left neural foramen and spinal canal   compressing the descending roots resulting in moderate central canal,   severe left lateral recess and foraminal stenosis. Soft tissue mass   extends into the left hemipelvis / iliopsoas region.    Partially imaged heterogeneous mass in the pelvis likely of uterine   origin.    Fullness in the renal collecting system, left greater than right,   hydronephrosis versus parapelvic cysts, as this region is partially   imaged correlation with dedicated abdominal/pelvic imaging is suggested      < end of copied text >            RADIOLOGY & ADDITIONAL TESTS:  Studies reviewed.    ASSESSMENT & PLAN:

## 2018-09-20 NOTE — CONSULT NOTE ADULT - PROBLEM SELECTOR RECOMMENDATION 9
- patient is not currently a surgical candidate  - pending recovery, rehabilitation, and further care for her spinal mass/metastasis per primary team  - plan to review pathology and discuss patient's case at upcoming gyn-onc tumor board  - vaginal discharge most likely 2/2 uterine mass; exam deferred as per attending    Discussed with attending Dr. Goldberg.    Nafisa Willett MD PGY2
- cause of baseline chronic back pain, worsened now due to L2 lytic lesion
-likely metastatic bone lesion of L2 causing severe lower back pain   -MRI L-spine today under anesthesia   -pain control w/ Dilaudid IV prn   -Decadron as per neurosurgery   -possible OR pending MRI results

## 2018-09-20 NOTE — CONSULT NOTE ADULT - SUBJECTIVE AND OBJECTIVE BOX
GYN Consult Note    HPI:  73yoF postmenopausal  h/o HTN, prediabetes and chronic back pain 2/2 spinal stenosis initially p/w worsening of chronic back pain at [facility] and found to have uterine mass and spinal mass on imaging. At [facility] gyn was consulted and further recommendations were deferred pending biopsy of patient's spinal lesion. 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 Patient was transferred to [facility] prior to final path of her spinal lesion biopsy for further work up and management by spine and neurosurgery team. Pt underwent angio/embo on  and L2 corpectomy, T11-L4 fusion and exesion of the mass on 18 and paraspinal muscle flaps and complex closure with plastics.    Upon interview and exam today at [facility], patient reports she is diong well. She states she has a history of abnormal uterine bleeding occurring intermittently since menopause approximately 20 years ago. She states that between [id number] she underwent D&C for AUB but was lost to gyn follow-up afterwards. She believes she last had vaginal bleeding at the beginning of August but cannot recall details of amount or duration. Neurosurg PA noted that the patient endorsed some vaginal discharge during her hospital course but the patient did not endorse this upon interview. Pt denies any other concerns at present.    OB/GYN HISTORY: none    Last Menstrual Period: ~    Name of GYN Physician: none     PAST MEDICAL & SURGICAL HISTORY:  Lumbar spinal stenosis  Prediabetes  Essential (primary) hypertension  S/P right knee arthroscopy  S/P dilation and curettage: Early &#x27;s for DUB, findings benign.  S/P cholecystectomy    REVIEW OF SYSTEMS  General: denies fevers, chills, tiredness  Respiratory and Thorax: denies shortness of breath, denies cough  Cardiovascular: denies chest pain and denies palpitations  Gastrointestinal: denies abdominal pain, nausea/ vomiting	  Genitourinary: reports intermittent vaginal bleeding	  Constitutional, Cardiovascular, Respiratory, Gastrointestinal, Genitourinary, Musculoskeletal and Integumentary review of systems are normal except as noted. 	    MEDICATIONS  (STANDING):  cyclobenzaprine 5 milliGRAM(s) Oral three times a day  dexamethasone  Injectable 4 milliGRAM(s) IV Push every 6 hours  dextrose 50% Injectable 12.5 Gram(s) IV Push once  dextrose 50% Injectable 25 Gram(s) IV Push once  dextrose 50% Injectable 25 Gram(s) IV Push once  docusate sodium 100 milliGRAM(s) Oral three times a day  enoxaparin Injectable 40 milliGRAM(s) SubCutaneous at bedtime  HYDROmorphone PCA (1 mG/mL) 30 milliLiter(s) PCA Continuous PCA Continuous  influenza   Vaccine 0.5 milliLiter(s) IntraMuscular once  insulin lispro (HumaLOG) corrective regimen sliding scale   SubCutaneous three times a day before meals  insulin lispro (HumaLOG) corrective regimen sliding scale   SubCutaneous at bedtime  lactulose Syrup 20 Gram(s) Oral every 6 hours  losartan 50 milliGRAM(s) Oral daily  polyethylene glycol 3350 17 Gram(s) Oral daily  senna 2 Tablet(s) Oral at bedtime  sodium chloride 0.9% with potassium chloride 20 mEq/L 1000 milliLiter(s) (100 mL/Hr) IV Continuous <Continuous>    MEDICATIONS  (PRN):  acetaminophen   Tablet .. 650 milliGRAM(s) Oral every 6 hours PRN Temp greater or equal to 38C (100.4F), Mild Pain (1 - 3)  dextrose 40% Gel 15 Gram(s) Oral once PRN Blood Glucose LESS THAN 70 milliGRAM(s)/deciliter  diphenhydrAMINE   Capsule 25 milliGRAM(s) Oral every 24 hours PRN Insomnia  glucagon  Injectable 1 milliGRAM(s) IntraMuscular once PRN Glucose LESS THAN 70 milligrams/deciliter  HYDROmorphone PCA (1 mG/mL) Rescue Clinician Bolus 1 milliGRAM(s) IV Push every 1 hour PRN for pain unrelieved with PCA demand dose  naloxone Injectable 0.1 milliGRAM(s) IV Push every 3 minutes PRN For ANY of the following changes in patient status:  A. RR LESS THAN 10 breaths per minute, B. Oxygen saturation LESS THAN 90%, C. Sedation score of 6  ondansetron Injectable 4 milliGRAM(s) IV Push every 6 hours PRN Nausea and/or Vomiting    ALLERGIES:  No Known Allergies  Intolerances      FAMILY HISTORY:  Family history of acute myocardial infarction (Mother): mother  Family history of diabetes mellitus (Mother, Sibling)      Vital Signs Last 24 Hrs  T(C): 36.4 (20 Sep 2018 09:00), Max: 36.9 (19 Sep 2018 22:18)  T(F): 97.5 (20 Sep 2018 09:00), Max: 98.4 (19 Sep 2018 22:18)  HR: 65 (20 Sep 2018 09:00) (65 - 88)  BP: 121/53 (20 Sep 2018 09:00) (115/71 - 151/80)  BP(mean): --  RR: 18 (20 Sep 2018 09:00) (18 - 18)  SpO2: 100% (20 Sep 2018 09:00) (96% - 100%)    PHYSICAL EXAM:  Gen: NAD, supine in bed, alert and oriented x 3  Cardiovascular: regular rate and rhythm  Respiratory: breathing comfortably on RA  Abd: obese, soft, non tender  Pelvic: deferred as per attending    LABS:                        9.5    22.71 )-----------( 181      ( 20 Sep 2018 07:45 )             30.5     09-    137  |  107  |  31<H>  ----------------------------<  154<H>  4.4   |  21<L>  |  0.75    Ca    9.3      20 Sep 2018 06:53      RADIOLOGY & ADDITIONAL STUDIES:  < from: MR Lumbar Spine w/wo IV Cont (18 @ 17:49) >  EXAM:  MR SPINE LUMBAR WAW IC                            PROCEDURE DATE:  2018            INTERPRETATION:      REASON FOR EXAM: L2 lesion       TECHNIQUE:   Standardized multiplanar fat and water weighted pulse   sequences were obtained without and following administration of 10 mL   Gadavist intravenous contrast.    COMPARISON: 2018.    FINDINGS:  There is expansile destructive enhancing lesion of vertebral body of L2   extending into the left epidural space, left neural foramen/lateral   recess and left paraspinal soft tissues effacing the ventral thecal sac   and resulting in moderate stenosis. There is compression of descending   roots of cauda equina and extending into the left neural foramen.  There   is large left paraspinal soft tissue component. There are also separate   nodular densities in the left hemipelvis. There is masslike prominence of   the uterus which is partially imaged.         There is osteopenia. There is no acute compression fracture.         Normal conus medullaris that terminates at the L1.       L1-2: There is disc desiccation and decrease in disc height. There is a   mild bulge and endplate spur. There are degenerative changes of the facet   joints.  Normal central canal and bilateral lateral recesses.  Normal   bilateral intervertebral neural foramina.    L2-3: There is destruction of vertebral body of L2 with large left   paraspinal and parapsoas mass and left epidural soft tissue mass   resulting in severe left foraminal stenosis with moderate mass effect on   descending roots of cauda equina.    L3-4: There is disc desiccation and with central disc protrusion annular   tear. There is adjacent large left paraspinal/psoas metastatic mass.   There are degenerative changes of the facetjoints. There is moderate   canal and left greater than right foraminal stenosis.    L4-5: There is disc desiccation and normal height. There is 4 mm anterior   spondylolisthesis, mild bulge and facet arthrosis. There is mild to   moderate canal and foraminal narrowing.      L5-S1: There is disc desiccation and decrease in height. There is bulge   and endplate spur. There are degenerative changes of the facet joints.   There is normal canal and mild foraminal stenosis.    There is fullness of theleft renal collecting system may represent   parapelvic cysts versus hydronephrosis. There is also mild prominence of   the right renal collecting system. Few small cysts are present and   visualized kidneys which are incompletely imaged. There are degenerative   changes of the visualized sacroiliac joints.    IMPRESSION:  Expansile metastatic infiltration of vertebral body of L2 which is   destroyed with associated large left paraspinal and epidural soft tissue   component extending into the left neural foramen and spinal canal   compressing the descending roots resulting in moderate central canal,   severe left lateral recess and foraminal stenosis. Soft tissue mass   extends into the left hemipelvis / iliopsoas region.    Partially imaged heterogeneous mass in the pelvis likely of uterine   origin.    Fullness in the renal collecting system, left greater than right,   hydronephrosis versus parapelvic cysts, as this region is partially   imaged correlation with dedicated abdominal/pelvic imaging is suggested                    JANINE XIE M.D., ATTENDING RADIOLOGIST  This document has been electronically signed. Sep 14 2018 12:02PM    < end of copied text >  < from: CT Thoracic Spine w/ IV Cont (18 @ 09:51) >  EXAM:  CT THORACIC SPINE IC      EXAM:  CT LUMBAR SPINE IC      EXAM:  CT CERVICAL SPINE IC        PROCEDURE DATE:  Sep 12 2018         INTERPRETATION:  History: Uterine mass. Metastatic workup. Staging CT   scan of the spine.     Multiple axial sections were performed through the cervical thoracic and   lumbar spine region. Coronal and sagittal reconstructions were performed   as well.    Cervical spine:    Loss of normal cervical lordosis is seen which could be due to   positioning or muscle spasm    The vertebral body height appears normal.    Bilateral hypertrophic facet joint changes are seen at multiple levels   which are secondary to degenerative changes.    No acute fractures or dislocations are identified.    No abnormal lytic or blastic lesions are seen.    Evaluation of the paraspinal soft tissues is limited by lack of IV   contrast though grossly unremarkable.    There are no acute fractures or dislocations identified.    Thoracic spine:    Increased kyphosis is seen.    Scoliosis.    Schmorl's nodes seen multiple levels which are secondary to degenerative   change    Calcification is seen involving the T7-8, T8-9 and T9-10 to T10-T11 disc   space which is secondary degenerative changes    There are no acute fractures ordislocations identified.    No abnormal lytic or blastic lesions seen.    Evaluation of the paraspinal soft tissues is limited due to lack of IV   contrast.    Abnormal soft tissue lesions are seen involving the lungs bilaterally.   These findings islikely compatible with underlying metastasis.    Clips are seen in the region of gallbladder compatible with prior surgery.    Lumbar spine:    Scoliosis is seen.    Loss of the normal lumbar lordosis is seen.    Abnormal lytic lesion is seen involving the central to left portion of   the L2 vertebral body. There is extensive paraspinal extension of this   process seen as well as epidural extension identified which does appear   to cause moderate narrowing of the spinal canal and mass effect on the   thecal sac. MRI can be done to better characterize this finding if   clinically indicated and there are no contraindications.     Disc bulges, disc osteophytes and bilateral hypertrophic facet changes   are seen at multiple levels which are secondary to degenerative changes.   These findings cause mild narrowing of the spinal canal at the T12-L1,   L1-2, L3-4 and L5-S1 levels and moderate narrowing of the spinal canal at   the L2-3 and L4-5 levels.    Impression: Degenerative changes as described above.    Abnormal lytic lesion involving the L2 vertebral body of the paraspinal   epidural extension of tumor seen. This likely compatible with underlying   metastasis given patient's history.    Abnormal lesion is seen involving the lungs bilaterally.                                SHANTAL BUSH M.D., ATTENDING RADIOLOGIST  This document has been electronically signed. Sep 12 2018 10:42AM    < end of copied text >  < from: CT Lumbar Spine No Cont (18 @ 10:17) >  EXAM:  CT LUMBAR SPINE                            PROCEDURE DATE:  2018            INTERPRETATION:  Noncontrast CT examination of the lumbar spine    CLINICAL INDICATION: L2 CORPECTOMY, T11-L4 FUSION    TECHNIQUE:  Direct axial CT scanning ofthe lumbar spine was obtained   without the administration of intravenous contrast.  Sagittal and coronal   reformats were provided.    COMPARISON: CT lumbar spine 2018.    FINDINGS:     Redemonstration of large lytic destructive process involving the L2   vertebral body. Similar appearing 6.4 x 4.2 cm destructive soft tissue   mass approximating the left aspect of the L2 vertebral body.    Interval L2 corpectomy, with placement of an interbody spacer device at   that level. Interval L1 and L2 total laminectomies and posterior fusion   of T11-L4 via multilevel pedicle screws and paired spinal rods. Hardware   is well-placed. No evidence of hardware loosening.    There is a minimal grade 1 retrolisthesis of L1 on L2 which is new since   2018. Otherwise, vertebral alignment is maintained. There is   multilevel disc space narrowing.    Evaluation of the spinal canal contents is markedly limited by CT   modality and streak artifact from spinal hardware.    IMPRESSION:    Redemonstration of large lytic destructive process involving the L2   vertebral body. Similar appearing 6.4 x 4.2 cm destructive soft tissue   mass approximating the left aspect of the L2 vertebral body.    Interval L2 corpectomy, L1 and L2 total laminectomies, and posterior   fusion of T11-L4 as described. Hardware is well-placed. No evidence of   hardware loosening.    Evaluation of the spinal canal contents is markedly limited by CT   modality and streak artifact from spinal hardware.                          OTILIA GLORIA M.D., ATTENDING RADIOLOGIST  This document has been electronically signed. Sep 20 2018  9:03AM    < end of copied text >

## 2018-09-20 NOTE — PROGRESS NOTE ADULT - PROBLEM SELECTOR PLAN 2
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

## 2018-09-20 NOTE — PROGRESS NOTE ADULT - ASSESSMENT
73F with PMH of HTN, spinal stenosis, chronic back pain here with worsening pain. Pain started in early 2017 with R-sided sciatica, resulting in diagnosis of lumbar spinal stenosis, and has used gabapentin and percocet at home. Pain subsequently worsened, causing difficulty walking and difficulty being supine, and now has parasthesias radiating to her L thigh. Also with constipation. CT A/P showed concern for new metastatic uterine CA to bone and lung, and came to [facility] from [facility] for management of her spinal lesion, now s/p T11-L4 fusion, and L2 corpectomy. 73F with PMH of HTN, spinal stenosis, chronic back pain here with worsening pain. Pain started in early 2017 with R-sided sciatica, resulting in diagnosis of lumbar spinal stenosis, and has used gabapentin and percocet at home. Pain subsequently worsened, causing difficulty walking and difficulty being supine, and now has parasthesias radiating to her L thigh. Also with constipation. CT A/P showed concern for new metastatic uterine CA to bone and lung, and came to [facility] from [facility] for management of her spinal lesion, now s/p T11-L4 fusion, and L2 corpectomy.

## 2018-09-20 NOTE — PROGRESS NOTE ADULT - PROBLEM SELECTOR PLAN 3
- patient currently on Dilaudid PCA with 0.5mg/hr basal rate, and 0.5mg demand. Current PCA: dilaudid [0.5CR/0.5DD/20 mins/2mg per h/1Q1 CAB]  - continue with Flexaril 5mg TID to standing   - continue with current bowel regimen.   - continue to monitor - patient currently on Dilaudid PCA with 0.5mg/hr basal rate, and 0.5mg demand. Current PCA: dilaudid [0.5CR/0.5DD/20 mins/2mg per h/1Q1 CAB]  - reporting improved pain control with basal rate  - continue with standing Flexaril 5mg TID  - continue with current bowel regimen  - continue to monitor

## 2018-09-20 NOTE — PROGRESS NOTE ADULT - PROBLEM SELECTOR PLAN 4
Cellulitis of LLE diagnosed at [facility], resolved   Completed course of Ancef   Blood cultures NGTD  LE dopplers negative for DVT.

## 2018-09-20 NOTE — PROGRESS NOTE ADULT - ASSESSMENT
73F hx HTN, prediabetes, spinal stenosis and RP soft tissue mass now POD#2 s/p L2 anterior corpectomy, T11-4 fusion, and paraspinal muscle flaps and complex closure    - Next aquacel change 9/21  - Cont drains  - Rest of care per primary    Plastic Surgery  [phone]

## 2018-09-20 NOTE — PROGRESS NOTE ADULT - PROBLEM SELECTOR PLAN 1
-likely neoplasm related pain - metastatic bone lesion of L2 causing severe lower back pain   - s/p L2 corpectomy T11-L4 fusion  - On Dilaudid PCA pump, palliative care following for pain control   - Decadron as per neurosurgery  - Flexeril q8h  - PT

## 2018-09-20 NOTE — PROGRESS NOTE ADULT - SUBJECTIVE AND OBJECTIVE BOX
HPI:  Patient is a 73 year old female with chronic back pain which progressively worsened.  Began to develop parasthesias to the leg thigh as well as left leg weakness.  MRI was done which showed infiltrative process at L@ vertebral body.  CT scan then done which showed possible uterine cancer with mets.  Now presented to ED for increased severity of pain.    OVERNIGHT EVENTS:  No acute events overnight.  Incisional pain well controlled on dilaudid pca.  Palliative care following.  Hemovacs in place.  Tolerating diet.  Was out of bed to chair this am.    Vital Signs Last 24 Hrs  T(C): 36.4 (20 Sep 2018 09:00), Max: 36.9 (19 Sep 2018 22:18)  T(F): 97.5 (20 Sep 2018 09:00), Max: 98.4 (19 Sep 2018 22:18)  HR: 85 (20 Sep 2018 11:36) (65 - 88)  BP: 141/75 (20 Sep 2018 11:36) (115/71 - 151/80)  BP(mean): --  RR: 18 (20 Sep 2018 09:00) (18 - 18)  SpO2: 98% (20 Sep 2018 11:36) (96% - 100%)      PHYSICAL EXAM:  Neurological: awake, alert, oriented x3, follows commands, speech clear and fluent, moves UE 5/5 strength throughout b/l  LLE: 2-3/5 HF, 4/5 KF/KE, 5/5 DF/PF  RLE: 4/5 HF/KF/KE, 5/5 DF/PF  sensation present, intact, equal throughout    Cardiovascular: +s1, s2  Respiratory: clear to auscultation b/l  Gastrointestinal: soft, non-distended, non-tender  Genitourinary: +frederick  Extremities: +dp/pt pulses palpable b/l  Incision/Wound (performed with TIFFANY beltran as chaperone): right groin puncture site c/d/i, no hematoma, no erythema    TUBES/LINES:  [x] hemovac x2 (440cc and 40cc serosanguinous per 24 hours)    DIET:  [x] consistent carbohydrate    LABS:                        9.5    22.71 )-----------( 181      ( 20 Sep 2018 07:45 )             30.5     09-20    137  |  107  |  31<H>  ----------------------------<  154<H>  4.4   |  21<L>  |  0.75    Ca    9.3      20 Sep 2018 06:53          CAPILLARY BLOOD GLUCOSE      POCT Blood Glucose.: 198 mg/dL (20 Sep 2018 13:13)  POCT Blood Glucose.: 167 mg/dL (20 Sep 2018 09:41)  POCT Blood Glucose.: 257 mg/dL (19 Sep 2018 22:34)      Allergies    No Known Allergies      MEDICATIONS:  Antibiotics:  none    Neuro:  acetaminophen   Tablet .. 650 milliGRAM(s) Oral every 6 hours PRN  cyclobenzaprine 5 milliGRAM(s) Oral three times a day  diphenhydrAMINE   Capsule 25 milliGRAM(s) Oral every 24 hours PRN  HYDROmorphone PCA (1 mG/mL) 30 milliLiter(s) PCA Continuous PCA Continuous  HYDROmorphone PCA (1 mG/mL) Rescue Clinician Bolus 1 milliGRAM(s) IV Push every 1 hour PRN  ondansetron Injectable 4 milliGRAM(s) IV Push every 6 hours PRN    Anticoagulation:  enoxaparin Injectable 40 milliGRAM(s) SubCutaneous at bedtime    OTHER:  dexamethasone  Injectable 4 milliGRAM(s) IV Push every 6 hours  dextrose 40% Gel 15 Gram(s) Oral once PRN  dextrose 50% Injectable 12.5 Gram(s) IV Push once  dextrose 50% Injectable 25 Gram(s) IV Push once  dextrose 50% Injectable 25 Gram(s) IV Push once  docusate sodium 100 milliGRAM(s) Oral three times a day  glucagon  Injectable 1 milliGRAM(s) IntraMuscular once PRN  influenza   Vaccine 0.5 milliLiter(s) IntraMuscular once  insulin lispro (HumaLOG) corrective regimen sliding scale   SubCutaneous three times a day before meals  insulin lispro (HumaLOG) corrective regimen sliding scale   SubCutaneous at bedtime  lactulose Syrup 20 Gram(s) Oral every 6 hours  losartan 50 milliGRAM(s) Oral daily  magnesium citrate Solution 300 milliLiter(s) Oral once  naloxone Injectable 0.1 milliGRAM(s) IV Push every 3 minutes PRN  nystatin Powder 1 Application(s) Topical two times a day  polyethylene glycol 3350 17 Gram(s) Oral every 12 hours  senna 2 Tablet(s) Oral at bedtime    IVF:  sodium chloride 0.9% with potassium chloride 20 mEq/L 1000 milliLiter(s) IV Continuous <Continuous>    CULTURES:  none    RADIOLOGY & ADDITIONAL TESTS:  none HPI:  Patient is a 73 year old female with chronic back pain which progressively worsened.  Began to develop parasthesias to the leg thigh as well as left leg weakness.  MRI was done which showed infiltrative process at L@ vertebral body.  CT scan then done which showed possible uterine cancer with mets.  Now presented to ED for increased severity of pain.    OVERNIGHT EVENTS:  No acute events overnight.  Incisional pain well controlled on dilaudid pca.  Palliative care following.  Hemovacs in place.  Tolerating diet.  Was out of bed to chair this am.    Vital Signs Last 24 Hrs  T(C): 36.4 (20 Sep 2018 09:00), Max: 36.9 (19 Sep 2018 22:18)  T(F): 97.5 (20 Sep 2018 09:00), Max: 98.4 (19 Sep 2018 22:18)  HR: 85 (20 Sep 2018 11:36) (65 - 88)  BP: 141/75 (20 Sep 2018 11:36) (115/71 - 151/80)  BP(mean): --  RR: 18 (20 Sep 2018 09:00) (18 - 18)  SpO2: 98% (20 Sep 2018 11:36) (96% - 100%)      PHYSICAL EXAM:  Neurological: awake, alert, oriented x3, follows commands, speech clear and fluent, moves UE 5/5 strength throughout b/l  LLE: 2-3/5 HF, 4/5 KF/KE, 5/5 DF/PF  RLE: 4/5 HF/KF/KE, 5/5 DF/PF  sensation present, intact, equal throughout    Cardiovascular: +s1, s2  Respiratory: clear to auscultation b/l  Gastrointestinal: soft, non-distended, non-tender  Genitourinary: +frederick  Extremities: +dp/pt pulses palpable b/l  Incision/Wound (performed with TIFFANY beltran as chaperone): right groin puncture site c/d/i, no hematoma, no erythema, posterior vertical incision dressing c/d/i    TUBES/LINES:  [x] hemovac x2 (440cc and 40cc serosanguinous per 24 hours)    DIET:  [x] consistent carbohydrate    LABS:                        9.5    22.71 )-----------( 181      ( 20 Sep 2018 07:45 )             30.5     09-20    137  |  107  |  31<H>  ----------------------------<  154<H>  4.4   |  21<L>  |  0.75    Ca    9.3      20 Sep 2018 06:53          CAPILLARY BLOOD GLUCOSE      POCT Blood Glucose.: 198 mg/dL (20 Sep 2018 13:13)  POCT Blood Glucose.: 167 mg/dL (20 Sep 2018 09:41)  POCT Blood Glucose.: 257 mg/dL (19 Sep 2018 22:34)      Allergies    No Known Allergies      MEDICATIONS:  Antibiotics:  none    Neuro:  acetaminophen   Tablet .. 650 milliGRAM(s) Oral every 6 hours PRN  cyclobenzaprine 5 milliGRAM(s) Oral three times a day  diphenhydrAMINE   Capsule 25 milliGRAM(s) Oral every 24 hours PRN  HYDROmorphone PCA (1 mG/mL) 30 milliLiter(s) PCA Continuous PCA Continuous  HYDROmorphone PCA (1 mG/mL) Rescue Clinician Bolus 1 milliGRAM(s) IV Push every 1 hour PRN  ondansetron Injectable 4 milliGRAM(s) IV Push every 6 hours PRN    Anticoagulation:  enoxaparin Injectable 40 milliGRAM(s) SubCutaneous at bedtime    OTHER:  dexamethasone  Injectable 4 milliGRAM(s) IV Push every 6 hours  dextrose 40% Gel 15 Gram(s) Oral once PRN  dextrose 50% Injectable 12.5 Gram(s) IV Push once  dextrose 50% Injectable 25 Gram(s) IV Push once  dextrose 50% Injectable 25 Gram(s) IV Push once  docusate sodium 100 milliGRAM(s) Oral three times a day  glucagon  Injectable 1 milliGRAM(s) IntraMuscular once PRN  influenza   Vaccine 0.5 milliLiter(s) IntraMuscular once  insulin lispro (HumaLOG) corrective regimen sliding scale   SubCutaneous three times a day before meals  insulin lispro (HumaLOG) corrective regimen sliding scale   SubCutaneous at bedtime  lactulose Syrup 20 Gram(s) Oral every 6 hours  losartan 50 milliGRAM(s) Oral daily  magnesium citrate Solution 300 milliLiter(s) Oral once  naloxone Injectable 0.1 milliGRAM(s) IV Push every 3 minutes PRN  nystatin Powder 1 Application(s) Topical two times a day  polyethylene glycol 3350 17 Gram(s) Oral every 12 hours  senna 2 Tablet(s) Oral at bedtime    IVF:  sodium chloride 0.9% with potassium chloride 20 mEq/L 1000 milliLiter(s) IV Continuous <Continuous>    CULTURES:  none    RADIOLOGY & ADDITIONAL TESTS:  none

## 2018-09-20 NOTE — PROGRESS NOTE ADULT - PROBLEM SELECTOR PLAN 3
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

## 2018-09-21 LAB
ANION GAP SERPL CALC-SCNC: 7 MMOL/L — SIGNIFICANT CHANGE UP (ref 5–17)
BASOPHILS # BLD AUTO: 0.01 K/UL — SIGNIFICANT CHANGE UP (ref 0–0.2)
BASOPHILS NFR BLD AUTO: 0.1 % — SIGNIFICANT CHANGE UP (ref 0–2)
BUN SERPL-MCNC: 30 MG/DL — HIGH (ref 7–23)
CALCIUM SERPL-MCNC: 9.4 MG/DL — SIGNIFICANT CHANGE UP (ref 8.4–10.5)
CHLORIDE SERPL-SCNC: 104 MMOL/L — SIGNIFICANT CHANGE UP (ref 96–108)
CO2 SERPL-SCNC: 24 MMOL/L — SIGNIFICANT CHANGE UP (ref 22–31)
CREAT SERPL-MCNC: 0.72 MG/DL — SIGNIFICANT CHANGE UP (ref 0.5–1.3)
EOSINOPHIL # BLD AUTO: 0 K/UL — SIGNIFICANT CHANGE UP (ref 0–0.5)
EOSINOPHIL NFR BLD AUTO: 0 % — SIGNIFICANT CHANGE UP (ref 0–6)
GLUCOSE BLDC GLUCOMTR-MCNC: 127 MG/DL — HIGH (ref 70–99)
GLUCOSE BLDC GLUCOMTR-MCNC: 135 MG/DL — HIGH (ref 70–99)
GLUCOSE BLDC GLUCOMTR-MCNC: 158 MG/DL — HIGH (ref 70–99)
GLUCOSE BLDC GLUCOMTR-MCNC: 175 MG/DL — HIGH (ref 70–99)
GLUCOSE SERPL-MCNC: 130 MG/DL — HIGH (ref 70–99)
HCT VFR BLD CALC: 29.2 % — LOW (ref 34.5–45)
HCT VFR BLD CALC: 30.6 % — LOW (ref 34.5–45)
HGB BLD-MCNC: 9.3 G/DL — LOW (ref 11.5–15.5)
HGB BLD-MCNC: 9.5 G/DL — LOW (ref 11.5–15.5)
IMM GRANULOCYTES NFR BLD AUTO: 0.5 % — SIGNIFICANT CHANGE UP (ref 0–1.5)
LYMPHOCYTES # BLD AUTO: 0.92 K/UL — LOW (ref 1–3.3)
LYMPHOCYTES # BLD AUTO: 4.9 % — LOW (ref 13–44)
MCHC RBC-ENTMCNC: 27.2 PG — SIGNIFICANT CHANGE UP (ref 27–34)
MCHC RBC-ENTMCNC: 29.1 PG — SIGNIFICANT CHANGE UP (ref 27–34)
MCHC RBC-ENTMCNC: 30.5 GM/DL — LOW (ref 32–36)
MCHC RBC-ENTMCNC: 32.5 GM/DL — SIGNIFICANT CHANGE UP (ref 32–36)
MCV RBC AUTO: 89.3 FL — SIGNIFICANT CHANGE UP (ref 80–100)
MCV RBC AUTO: 89.6 FL — SIGNIFICANT CHANGE UP (ref 80–100)
MONOCYTES # BLD AUTO: 0.26 K/UL — SIGNIFICANT CHANGE UP (ref 0–0.9)
MONOCYTES NFR BLD AUTO: 1.4 % — LOW (ref 2–14)
NEUTROPHILS # BLD AUTO: 17.44 K/UL — HIGH (ref 1.8–7.4)
NEUTROPHILS NFR BLD AUTO: 93.1 % — HIGH (ref 43–77)
PLATELET # BLD AUTO: 171 K/UL — SIGNIFICANT CHANGE UP (ref 150–400)
PLATELET # BLD AUTO: 181 K/UL — SIGNIFICANT CHANGE UP (ref 150–400)
POTASSIUM SERPL-MCNC: 4.6 MMOL/L — SIGNIFICANT CHANGE UP (ref 3.5–5.3)
POTASSIUM SERPL-SCNC: 4.6 MMOL/L — SIGNIFICANT CHANGE UP (ref 3.5–5.3)
RBC # BLD: 3.26 M/UL — LOW (ref 3.8–5.2)
RBC # BLD: 3.42 M/UL — LOW (ref 3.8–5.2)
RBC # FLD: 14.2 % — SIGNIFICANT CHANGE UP (ref 10.3–14.5)
RBC # FLD: 15.6 % — HIGH (ref 10.3–14.5)
SODIUM SERPL-SCNC: 135 MMOL/L — SIGNIFICANT CHANGE UP (ref 135–145)
SURGICAL PATHOLOGY STUDY: SIGNIFICANT CHANGE UP
WBC # BLD: 18.72 K/UL — HIGH (ref 3.8–10.5)
WBC # BLD: 20.6 K/UL — HIGH (ref 3.8–10.5)
WBC # FLD AUTO: 18.72 K/UL — HIGH (ref 3.8–10.5)
WBC # FLD AUTO: 20.6 K/UL — HIGH (ref 3.8–10.5)

## 2018-09-21 PROCEDURE — 99222 1ST HOSP IP/OBS MODERATE 55: CPT | Mod: GC

## 2018-09-21 PROCEDURE — 99233 SBSQ HOSP IP/OBS HIGH 50: CPT

## 2018-09-21 PROCEDURE — 99232 SBSQ HOSP IP/OBS MODERATE 35: CPT | Mod: GC

## 2018-09-21 RX ORDER — DEXAMETHASONE 0.5 MG/5ML
3 ELIXIR ORAL EVERY 6 HOURS
Qty: 0 | Refills: 0 | Status: DISCONTINUED | OUTPATIENT
Start: 2018-09-21 | End: 2018-09-23

## 2018-09-21 RX ORDER — HYDROMORPHONE HYDROCHLORIDE 2 MG/ML
30 INJECTION INTRAMUSCULAR; INTRAVENOUS; SUBCUTANEOUS
Qty: 0 | Refills: 0 | Status: DISCONTINUED | OUTPATIENT
Start: 2018-09-21 | End: 2018-09-24

## 2018-09-21 RX ORDER — HYDROMORPHONE HYDROCHLORIDE 2 MG/ML
1.5 INJECTION INTRAMUSCULAR; INTRAVENOUS; SUBCUTANEOUS
Qty: 0 | Refills: 0 | Status: DISCONTINUED | OUTPATIENT
Start: 2018-09-21 | End: 2018-09-24

## 2018-09-21 RX ORDER — CYCLOBENZAPRINE HYDROCHLORIDE 10 MG/1
5 TABLET, FILM COATED ORAL THREE TIMES A DAY
Qty: 0 | Refills: 0 | Status: DISCONTINUED | OUTPATIENT
Start: 2018-09-21 | End: 2018-10-03

## 2018-09-21 RX ORDER — GABAPENTIN 400 MG/1
100 CAPSULE ORAL THREE TIMES A DAY
Qty: 0 | Refills: 0 | Status: DISCONTINUED | OUTPATIENT
Start: 2018-09-21 | End: 2018-10-03

## 2018-09-21 RX ADMIN — POLYETHYLENE GLYCOL 3350 17 GRAM(S): 17 POWDER, FOR SOLUTION ORAL at 17:44

## 2018-09-21 RX ADMIN — CYCLOBENZAPRINE HYDROCHLORIDE 5 MILLIGRAM(S): 10 TABLET, FILM COATED ORAL at 06:00

## 2018-09-21 RX ADMIN — Medication 1: at 12:52

## 2018-09-21 RX ADMIN — HYDROMORPHONE HYDROCHLORIDE 30 MILLILITER(S): 2 INJECTION INTRAMUSCULAR; INTRAVENOUS; SUBCUTANEOUS at 06:23

## 2018-09-21 RX ADMIN — SODIUM CHLORIDE 100 MILLILITER(S): 9 INJECTION INTRAMUSCULAR; INTRAVENOUS; SUBCUTANEOUS at 16:13

## 2018-09-21 RX ADMIN — ENOXAPARIN SODIUM 40 MILLIGRAM(S): 100 INJECTION SUBCUTANEOUS at 23:01

## 2018-09-21 RX ADMIN — GABAPENTIN 100 MILLIGRAM(S): 400 CAPSULE ORAL at 23:02

## 2018-09-21 RX ADMIN — SENNA PLUS 2 TABLET(S): 8.6 TABLET ORAL at 23:00

## 2018-09-21 RX ADMIN — LACTULOSE 20 GRAM(S): 10 SOLUTION ORAL at 23:01

## 2018-09-21 RX ADMIN — Medication 4 MILLIGRAM(S): at 01:48

## 2018-09-21 RX ADMIN — Medication 1: at 08:29

## 2018-09-21 RX ADMIN — HYDROMORPHONE HYDROCHLORIDE 30 MILLILITER(S): 2 INJECTION INTRAMUSCULAR; INTRAVENOUS; SUBCUTANEOUS at 23:15

## 2018-09-21 RX ADMIN — HYDROMORPHONE HYDROCHLORIDE 30 MILLILITER(S): 2 INJECTION INTRAMUSCULAR; INTRAVENOUS; SUBCUTANEOUS at 11:00

## 2018-09-21 RX ADMIN — NYSTATIN CREAM 1 APPLICATION(S): 100000 CREAM TOPICAL at 06:00

## 2018-09-21 RX ADMIN — NYSTATIN CREAM 1 APPLICATION(S): 100000 CREAM TOPICAL at 17:45

## 2018-09-21 RX ADMIN — HYDROMORPHONE HYDROCHLORIDE 30 MILLILITER(S): 2 INJECTION INTRAMUSCULAR; INTRAVENOUS; SUBCUTANEOUS at 15:00

## 2018-09-21 RX ADMIN — Medication 4 MILLIGRAM(S): at 06:00

## 2018-09-21 RX ADMIN — Medication 3 MILLIGRAM(S): at 23:01

## 2018-09-21 RX ADMIN — HYDROMORPHONE HYDROCHLORIDE 30 MILLILITER(S): 2 INJECTION INTRAMUSCULAR; INTRAVENOUS; SUBCUTANEOUS at 13:16

## 2018-09-21 RX ADMIN — LOSARTAN POTASSIUM 50 MILLIGRAM(S): 100 TABLET, FILM COATED ORAL at 06:00

## 2018-09-21 RX ADMIN — HYDROMORPHONE HYDROCHLORIDE 30 MILLILITER(S): 2 INJECTION INTRAMUSCULAR; INTRAVENOUS; SUBCUTANEOUS at 01:42

## 2018-09-21 RX ADMIN — HYDROMORPHONE HYDROCHLORIDE 30 MILLILITER(S): 2 INJECTION INTRAMUSCULAR; INTRAVENOUS; SUBCUTANEOUS at 08:36

## 2018-09-21 RX ADMIN — Medication 100 MILLIGRAM(S): at 23:00

## 2018-09-21 RX ADMIN — HYDROMORPHONE HYDROCHLORIDE 30 MILLILITER(S): 2 INJECTION INTRAMUSCULAR; INTRAVENOUS; SUBCUTANEOUS at 19:22

## 2018-09-21 RX ADMIN — Medication 100 MILLIGRAM(S): at 13:32

## 2018-09-21 RX ADMIN — Medication 3 MILLIGRAM(S): at 17:44

## 2018-09-21 RX ADMIN — GABAPENTIN 100 MILLIGRAM(S): 400 CAPSULE ORAL at 13:34

## 2018-09-21 RX ADMIN — Medication 4 MILLIGRAM(S): at 11:46

## 2018-09-21 NOTE — PROGRESS NOTE ADULT - PROBLEM SELECTOR PLAN 3
- based on 24h usage, will increase PCA accordingly to dilaudid PCA [0.8CR/0.8DD/20 mins/3.2 per h/1.5Q1 CAB], patient agreeable  - gabapentin 100mg TID started for possible neuropathic component to RLE  - change standing Flexaril 5mg TID to PRN  - continue with current bowel regimen  - continue to monitor

## 2018-09-21 NOTE — PROGRESS NOTE ADULT - PROBLEM SELECTOR PLAN 1
-likely neoplasm related pain, metastatic bone lesion of L2 causing severe lower back pain   - s/p L2 corpectomy T11-L4 fusion  - On Dilaudid PCA pump, palliative care following for pain control   - Decadron taper a per neurosurgery   - Flexeril prn muscle spasms   - PT

## 2018-09-21 NOTE — PROGRESS NOTE ADULT - SUBJECTIVE AND OBJECTIVE BOX
HPI:  Patient is a 73 year old female with chronic back pain which progressively worsened.  Began to develop parasthesias to the leg thigh as well as left leg weakness.  MRI was done which showed infiltrative process at L@ vertebral body.  CT scan then done which showed possible uterine cancer with mets.  Now presented to ED for increased severity of pain.    OVERNIGHT EVENTS:  No acute events overnight.  Patient received magnesium citrate and had bowel movement yesterday evening.  Incisional pain significantly improved today, currently on dilaudid pca.  Has been out of bed to chair.  Tolerating diet.      Vital Signs Last 24 Hrs  T(C): 36.6 (21 Sep 2018 08:40), Max: 36.6 (20 Sep 2018 21:15)  T(F): 97.8 (21 Sep 2018 08:40), Max: 97.9 (21 Sep 2018 01:45)  HR: 100 (21 Sep 2018 10:56) (69 - 100)  BP: 121/76 (21 Sep 2018 10:56) (106/66 - 129/59)  BP(mean): --  RR: 18 (21 Sep 2018 08:40) (18 - 18)  SpO2: 97% (21 Sep 2018 10:56) (95% - 100%)        PHYSICAL EXAM:  Neurological: awake, alert, oriented x3, follows commands, speech clear and fluent, moves UE 5/5 strength throughout b/l  LLE: 2-3/5 HF, 4/5 KF/KE, 5/5 DF/PF  RLE: 4/5 HF/KF/KE, 5/5 DF/PF  sensation present, intact, equal throughout    Cardiovascular: +s1, s2  Respiratory: clear to auscultation b/l  Gastrointestinal: soft, non-distended, non-tender  Genitourinary: +voiding  Extremities: +dp/pt pulses palpable b/l  Incision/Wound:  posterior midline vertical incision dressing c/d/i, hemovac x2    TUBES/LINES:  [x] hemovac x2 (280cc and 40cc serosanguinous per 24 hours)    DIET:  [x] consistent carbohydrate    LABS:                        9.5    18.72 )-----------( 171      ( 21 Sep 2018 07:55 )             29.2     09-21    135  |  104  |  30<H>  ----------------------------<  130<H>  4.6   |  24  |  0.72    Ca    9.4      21 Sep 2018 07:08          CAPILLARY BLOOD GLUCOSE      POCT Blood Glucose.: 158 mg/dL (21 Sep 2018 08:27)  POCT Blood Glucose.: 140 mg/dL (20 Sep 2018 22:09)  POCT Blood Glucose.: 161 mg/dL (20 Sep 2018 17:19)  POCT Blood Glucose.: 198 mg/dL (20 Sep 2018 13:13)        Allergies    No Known Allergies        MEDICATIONS:  Antibiotics:  none  Neuro:  acetaminophen   Tablet .. 650 milliGRAM(s) Oral every 6 hours PRN  cyclobenzaprine 5 milliGRAM(s) Oral three times a day  diphenhydrAMINE   Capsule 25 milliGRAM(s) Oral every 24 hours PRN  HYDROmorphone PCA (1 mG/mL) 30 milliLiter(s) PCA Continuous PCA Continuous  HYDROmorphone PCA (1 mG/mL) Rescue Clinician Bolus 1 milliGRAM(s) IV Push every 1 hour PRN  ondansetron Injectable 4 milliGRAM(s) IV Push every 6 hours PRN    Anticoagulation:  enoxaparin Injectable 40 milliGRAM(s) SubCutaneous at bedtime    OTHER:  dexamethasone  Injectable 4 milliGRAM(s) IV Push every 6 hours  dextrose 40% Gel 15 Gram(s) Oral once PRN  dextrose 50% Injectable 12.5 Gram(s) IV Push once  dextrose 50% Injectable 25 Gram(s) IV Push once  dextrose 50% Injectable 25 Gram(s) IV Push once  docusate sodium 100 milliGRAM(s) Oral three times a day  glucagon  Injectable 1 milliGRAM(s) IntraMuscular once PRN  influenza   Vaccine 0.5 milliLiter(s) IntraMuscular once  insulin lispro (HumaLOG) corrective regimen sliding scale   SubCutaneous three times a day before meals  insulin lispro (HumaLOG) corrective regimen sliding scale   SubCutaneous at bedtime  lactulose Syrup 20 Gram(s) Oral every 6 hours  losartan 50 milliGRAM(s) Oral daily  naloxone Injectable 0.1 milliGRAM(s) IV Push every 3 minutes PRN  nystatin Powder 1 Application(s) Topical two times a day  polyethylene glycol 3350 17 Gram(s) Oral every 12 hours  senna 2 Tablet(s) Oral at bedtime    IVF:  sodium chloride 0.9%. 1000 milliLiter(s) IV Continuous <Continuous>    CULTURES:  none    RADIOLOGY & ADDITIONAL TESTS:  none

## 2018-09-21 NOTE — PROGRESS NOTE ADULT - ASSESSMENT
73F HTN, pre DM, chronic back pain secondary spinal stenosis is a/w acute on chronic back pain to [facility] and found to have an infiltrating L2 vertebral body mass as well as endometrial mass, large RP soft tissue mass and lung nodules concerning for metastatic disease, patient is s/p IR guided vertebral body mass biopsy on 9/12/18 and is s/p neurosurgical resection of the mass (9/12/18) results consistent with GYN origin    1. Pain secondary cancer with infiltrating L2 vertebral body mass  -appreciate neurosurgical care, patient is s/p resection and will require outpatient radiation, radiation oncology chart note is noted, plan for radiation as outpatient    2. Metastatic endometrial cancer   -patient will need to establish care with medical oncology after discharge, referral to [facility] will be made, for consideration of antineoplastic chemotherapy, this will be offered after radiation the spine is complete   -discussed with patient, her sister and friend today the results of biopsy and treatment plan. Discussed that as tumor has metastasized, Stage IV, it is incurable and treatment will be palliative in nature. Discussed that chemotherapy will likely be offered, but only after completing radiation and as an outpatient. Patient and her sister expressed understanding and all of their questions were answered to the best of my ability to their apparent satisfaction.     Please reconsult oncology fellow if any questions or concerns. Referral is made to [facility] and [facility]'s number ([phone] New Patient Scheduling) was given to patient to make appointment.     Waqas Dan  PGY-6, Hematology-Oncology Fellow  [phone] ([place]) [phone] ([facility])

## 2018-09-21 NOTE — CONSULT NOTE ADULT - SUBJECTIVE AND OBJECTIVE BOX
CC: back pain      HPI:  Ms. Son is a 74 y/o F with HTN, prediabetes and chronic back pain 2/2 spinal stenosis p/w worsening of chronic back pain.  Pt reported pain in lower back with R sided sciatica since early 2017.  She had imaging in 2017 which revealed lumbar spinal stenosis.  She had been prescribed gabapentin as well as percocet which she only needed sparingly.  Pt recently has developed worsening lower back pain with paresthesia radiating to L thigh.  Pain says it makes it difficult to walk, and she feels that her L leg is weaker.  Pt has been unable to lay flat, or stand 2/2 the pain and has spent most of her time sitting.  She has been taking percocet without relief.  She has chronic incontinence, but no new symptoms.  She has been constipated which she attributes to taking percocet more frequently.  She has not had foot numbness or saddle anesthesia.  She had imaging done 2 weeks ago including MRI which showed an infiltrative process at L2 vertebral body, lumbar spinal stenosis and RP soft tissue mass.  CT a/p showed signs suspicious of uterine ca with mets to bone and lung.  Pt was referred to an oncologist with appointment scheduled later this week, but due to increasing severity of pain, she was advised to come to the ED by her PCP.   She underwent a CT guided biopsy of the uterine mass at [facility] originally, which showed metastatic adenocarcinoma. Patient at [facility] underwent a L2 Corpectomy with T11-L4 fusion with Dr. Caceres on 9/19. As per Gyn-Onc, she is not a surgical candidate at this time.          REVIEW OF SYSTEMS: + back pain, No chest pain, shortness of breath, nausea, vomiting or diarhea; other ROS neg     PAST MEDICAL & SURGICAL HISTORY  Lumbar spinal stenosis  Prediabetes  Essential (primary) hypertension  No pertinent past medical history  S/P right knee arthroscopy  S/P dilation and curettage  S/P cholecystectomy  No significant past surgical history        FUNCTIONAL HISTORY:   Lives in apartment with no MAYI and elevator to 6th floor.   Independent in ambulation and ADLs prior with SAC    CURRENT FUNCTIONAL STATUS:  Bed Mobility: mod assist x 2    FAMILY HISTORY   Family history of acute myocardial infarction (Mother)  Family history of diabetes mellitus (Mother, Sibling)      RECENT LABS/IMAGING  CBC Full  -  ( 21 Sep 2018 07:55 )  WBC Count : 18.72 K/uL  Hemoglobin : 9.5 g/dL  Hematocrit : 29.2 %  Platelet Count - Automated : 171 K/uL  Mean Cell Volume : 89.6 fl  Mean Cell Hemoglobin : 29.1 pg  Mean Cell Hemoglobin Concentration : 32.5 gm/dL  Auto Neutrophil # : 17.44 K/uL  Auto Lymphocyte # : 0.92 K/uL  Auto Monocyte # : 0.26 K/uL  Auto Eosinophil # : 0.00 K/uL  Auto Basophil # : 0.01 K/uL  Auto Neutrophil % : 93.1 %  Auto Lymphocyte % : 4.9 %  Auto Monocyte % : 1.4 %  Auto Eosinophil % : 0.0 %  Auto Basophil % : 0.1 %    09-21    135  |  104  |  30<H>  ----------------------------<  130<H>  4.6   |  24  |  0.72    Ca    9.4      21 Sep 2018 07:08    CT L-Spine: Redemonstration of large lytic destructive process involving the L2   vertebral body. Similar appearing 6.4 x 4.2 cm destructive soft tissue   mass approximating the left aspect of the L2 vertebral body.    Interval L2 corpectomy, L1 and L2 total laminectomies, and posterior   fusion of T11-L4 as described. Hardware is well-placed. No evidence of   hardware loosening.    Evaluation of the spinal canal contents is markedly limited by CT   modality and streak artifact from spinal hardware.    MRI L-Spine: Expansile metastatic infiltration of vertebral body of L2 which is   destroyed with associated large left paraspinal and epidural soft tissue   component extending into the left neural foramen and spinal canal   compressing the descending roots resulting in moderate central canal,   severe left lateral recess and foraminal stenosis. Soft tissue mass   extends into the left hemipelvis / iliopsoas region.    Partially imaged heterogeneous mass in the pelvis likely of uterine   origin.    Fullness in the renal collecting system, left greater than right,   hydronephrosis versus parapelvic cysts, as this region is partially   imaged correlation with dedicated abdominal/pelvic imaging is suggested        VITALS  T(C): 36.6 (09-21-18 @ 08:40), Max: 36.6 (09-20-18 @ 21:15)  HR: 79 (09-21-18 @ 08:40) (69 - 85)  BP: 126/68 (09-21-18 @ 08:40) (106/66 - 141/75)  RR: 18 (09-21-18 @ 08:40) (18 - 18)  SpO2: 100% (09-21-18 @ 08:40) (98% - 100%)    ALLERGIES  No Known Allergies      MEDICATIONS   acetaminophen   Tablet .. 650 milliGRAM(s) Oral every 6 hours PRN  cyclobenzaprine 5 milliGRAM(s) Oral three times a day  dexamethasone  Injectable 4 milliGRAM(s) IV Push every 6 hours  dextrose 40% Gel 15 Gram(s) Oral once PRN  dextrose 50% Injectable 12.5 Gram(s) IV Push once  dextrose 50% Injectable 25 Gram(s) IV Push once  dextrose 50% Injectable 25 Gram(s) IV Push once  diphenhydrAMINE   Capsule 25 milliGRAM(s) Oral every 24 hours PRN  docusate sodium 100 milliGRAM(s) Oral three times a day  enoxaparin Injectable 40 milliGRAM(s) SubCutaneous at bedtime  glucagon  Injectable 1 milliGRAM(s) IntraMuscular once PRN  HYDROmorphone PCA (1 mG/mL) 30 milliLiter(s) PCA Continuous PCA Continuous  HYDROmorphone PCA (1 mG/mL) Rescue Clinician Bolus 1 milliGRAM(s) IV Push every 1 hour PRN  influenza   Vaccine 0.5 milliLiter(s) IntraMuscular once  insulin lispro (HumaLOG) corrective regimen sliding scale   SubCutaneous three times a day before meals  insulin lispro (HumaLOG) corrective regimen sliding scale   SubCutaneous at bedtime  lactulose Syrup 20 Gram(s) Oral every 6 hours  losartan 50 milliGRAM(s) Oral daily  naloxone Injectable 0.1 milliGRAM(s) IV Push every 3 minutes PRN  nystatin Powder 1 Application(s) Topical two times a day  ondansetron Injectable 4 milliGRAM(s) IV Push every 6 hours PRN  polyethylene glycol 3350 17 Gram(s) Oral every 12 hours  senna 2 Tablet(s) Oral at bedtime  sodium chloride 0.9%. 1000 milliLiter(s) IV Continuous <Continuous>      ----------------------------------------------------------------------------------------  PHYSICAL EXAM  Constitutional - NAD, Comfortable  HEENT - NCAT, EOMI  Neck - Supple, No limited ROM  Chest - CTA bilaterally, No wheeze, No rhonchi, No crackles  Cardiovascular -  S1S2, No murmurs  Abdomen - BS+, Soft, NTND  Extremities -  No calf tenderness   Neurologic Exam -                    Cognitive - Awake, Alert, AAO to self, place, date, year, situation     Communication - Fluent, No dysarthria, no aphasia     Cranial Nerves - CN 2-12 intact     Motor - No focal deficits     Sensory - Intact to LT     Reflexes - DTR Intact, No primitive reflexive     Balance - WNL Static  Psychiatric - Mood stable, Affect WNL      Impression:  73 y.o. F presented with worsening back pain, found to have L2 lesion likely from uterine cancer metastasis, s/p L2 corpectomy and T11-L4 fusion, now with gait and ADL dysfunctions.     Plan:  PT- ROM, Bed Mob, Transfers, Amb w AD and bracing as needed  OT- ADLs, bracing if needed  SLP- Dysphagia eval and treat  Prec- Falls, Spinal  DVT Prophylaxis- Lovenox  Skin- Turn q2 h, daily skin checks  Dispo- CC: back pain      HPI:  Ms. Son is a 74 y/o F with HTN, prediabetes and chronic back pain 2/2 spinal stenosis p/w worsening of chronic back pain.  Pt reported pain in lower back with R sided sciatica since early 2017.  She had imaging in 2017 which revealed lumbar spinal stenosis.  She had been prescribed gabapentin as well as percocet which she only needed sparingly.  Pt recently has developed worsening lower back pain with paresthesia radiating to L thigh.  Pain says it makes it difficult to walk, and she feels that her L leg is weaker.  Pt has been unable to lay flat, or stand 2/2 the pain and has spent most of her time sitting.  She has been taking percocet without relief.  She has chronic incontinence, but no new symptoms.  She has been constipated which she attributes to taking percocet more frequently.  She has not had foot numbness or saddle anesthesia.  She had imaging done 2 weeks ago including MRI which showed an infiltrative process at L2 vertebral body, lumbar spinal stenosis and RP soft tissue mass.  CT a/p showed signs suspicious of uterine ca with mets to bone and lung.  Pt was referred to an oncologist with appointment scheduled later this week, but due to increasing severity of pain, she was advised to come to the ED by her PCP.   She underwent a CT guided biopsy of the uterine mass at [facility] originally, which showed metastatic adenocarcinoma. Patient at [facility] underwent a L2 Corpectomy with T11-L4 fusion with Dr. Caceres on 9/19. As per Gyn-Onc, she is not a surgical candidate at this time.          REVIEW OF SYSTEMS: + back pain, +R calf pain since surgery, No chest pain, shortness of breath, nausea, vomiting or diarrhea other ROS neg     PAST MEDICAL & SURGICAL HISTORY  Lumbar spinal stenosis  Prediabetes  Essential (primary) hypertension  No pertinent past medical history  S/P right knee arthroscopy  S/P dilation and curettage  S/P cholecystectomy  No significant past surgical history        FUNCTIONAL HISTORY:   Lives in apartment with no MAYI and elevator to 6th floor.   Independent in ambulation and ADLs prior with [facility], but needed additional help for week before admission    CURRENT FUNCTIONAL STATUS:  Bed Mobility: mod assist x 2    FAMILY HISTORY   Family history of acute myocardial infarction (Mother)  Family history of diabetes mellitus (Mother, Sibling)      RECENT LABS/IMAGING  CBC Full  -  ( 21 Sep 2018 07:55 )  WBC Count : 18.72 K/uL  Hemoglobin : 9.5 g/dL  Hematocrit : 29.2 %  Platelet Count - Automated : 171 K/uL  Mean Cell Volume : 89.6 fl  Mean Cell Hemoglobin : 29.1 pg  Mean Cell Hemoglobin Concentration : 32.5 gm/dL  Auto Neutrophil # : 17.44 K/uL  Auto Lymphocyte # : 0.92 K/uL  Auto Monocyte # : 0.26 K/uL  Auto Eosinophil # : 0.00 K/uL  Auto Basophil # : 0.01 K/uL  Auto Neutrophil % : 93.1 %  Auto Lymphocyte % : 4.9 %  Auto Monocyte % : 1.4 %  Auto Eosinophil % : 0.0 %  Auto Basophil % : 0.1 %    09-21    135  |  104  |  30<H>  ----------------------------<  130<H>  4.6   |  24  |  0.72    Ca    9.4      21 Sep 2018 07:08    CT L-Spine: Redemonstration of large lytic destructive process involving the L2   vertebral body. Similar appearing 6.4 x 4.2 cm destructive soft tissue   mass approximating the left aspect of the L2 vertebral body.    Interval L2 corpectomy, L1 and L2 total laminectomies, and posterior   fusion of T11-L4 as described. Hardware is well-placed. No evidence of   hardware loosening.    Evaluation of the spinal canal contents is markedly limited by CT   modality and streak artifact from spinal hardware.    MRI L-Spine: Expansile metastatic infiltration of vertebral body of L2 which is   destroyed with associated large left paraspinal and epidural soft tissue   component extending into the left neural foramen and spinal canal   compressing the descending roots resulting in moderate central canal,   severe left lateral recess and foraminal stenosis. Soft tissue mass   extends into the left hemipelvis / iliopsoas region.    Partially imaged heterogeneous mass in the pelvis likely of uterine   origin.    Fullness in the renal collecting system, left greater than right,   hydronephrosis versus parapelvic cysts, as this region is partially   imaged correlation with dedicated abdominal/pelvic imaging is suggested        VITALS  T(C): 36.6 (09-21-18 @ 08:40), Max: 36.6 (09-20-18 @ 21:15)  HR: 79 (09-21-18 @ 08:40) (69 - 85)  BP: 126/68 (09-21-18 @ 08:40) (106/66 - 141/75)  RR: 18 (09-21-18 @ 08:40) (18 - 18)  SpO2: 100% (09-21-18 @ 08:40) (98% - 100%)    ALLERGIES  No Known Allergies      MEDICATIONS   acetaminophen   Tablet .. 650 milliGRAM(s) Oral every 6 hours PRN  cyclobenzaprine 5 milliGRAM(s) Oral three times a day  dexamethasone  Injectable 4 milliGRAM(s) IV Push every 6 hours  dextrose 40% Gel 15 Gram(s) Oral once PRN  dextrose 50% Injectable 12.5 Gram(s) IV Push once  dextrose 50% Injectable 25 Gram(s) IV Push once  dextrose 50% Injectable 25 Gram(s) IV Push once  diphenhydrAMINE   Capsule 25 milliGRAM(s) Oral every 24 hours PRN  docusate sodium 100 milliGRAM(s) Oral three times a day  enoxaparin Injectable 40 milliGRAM(s) SubCutaneous at bedtime  glucagon  Injectable 1 milliGRAM(s) IntraMuscular once PRN  HYDROmorphone PCA (1 mG/mL) 30 milliLiter(s) PCA Continuous PCA Continuous  HYDROmorphone PCA (1 mG/mL) Rescue Clinician Bolus 1 milliGRAM(s) IV Push every 1 hour PRN  influenza   Vaccine 0.5 milliLiter(s) IntraMuscular once  insulin lispro (HumaLOG) corrective regimen sliding scale   SubCutaneous three times a day before meals  insulin lispro (HumaLOG) corrective regimen sliding scale   SubCutaneous at bedtime  lactulose Syrup 20 Gram(s) Oral every 6 hours  losartan 50 milliGRAM(s) Oral daily  naloxone Injectable 0.1 milliGRAM(s) IV Push every 3 minutes PRN  nystatin Powder 1 Application(s) Topical two times a day  ondansetron Injectable 4 milliGRAM(s) IV Push every 6 hours PRN  polyethylene glycol 3350 17 Gram(s) Oral every 12 hours  senna 2 Tablet(s) Oral at bedtime  sodium chloride 0.9%. 1000 milliLiter(s) IV Continuous <Continuous>      ----------------------------------------------------------------------------------------  PHYSICAL EXAM  Constitutional - NAD, Comfortable, in TLSO  HEENT - NCAT, EOMI  Neck - Supple  Chest - CTA bilaterally, No wheeze  Cardiovascular -  S1S2, No murmurs  Abdomen - BS+, Soft  Extremities -  Mild R calf tenderness  Neurologic Exam -                    Cognitive - Awake, Alert, AAO to self, place, date, year, situation     Communication - Fluent, No dysarthria, no aphasia     Cranial Nerves - CN 2-12 intact     Motor - UE abduction limited due to pain, but 5/5 strength in elbow flexion/extension and  strength in b/l UE, grossly 3/5 strength in LLE, 2/5 strength in RLE proximally, 3/5 distally     Sensory - Decreased to light touch in bilateral LE     Reflexes - DTR Intact  Psychiatric - Mood stable, Affect WNL      Impression:  73 y.o. F presented with worsening back pain, found to have L2 lesion likely from uterine cancer metastasis, s/p L2 corpectomy and T11-L4 fusion, now with gait and ADL dysfunctions.     Plan:  PT- ROM, Bed Mob, Transfers, Amb w AD and bracing as needed  OT- ADLs, bracing if needed  SLP- Dysphagia eval and treat  Prec- Falls, Spinal  DVT Prophylaxis- Lovenox  Skin- Turn q2 h, daily skin checks  Dispo- CC: back pain    HPI:  Ms. Son is a 72 y/o F with HTN, prediabetes and chronic back pain 2/2 spinal stenosis p/w worsening of chronic back pain.  Pt reported pain in lower back with R sided sciatica since early 2017.  She had imaging in 2017 which revealed lumbar spinal stenosis.  She had been prescribed gabapentin as well as percocet which she only needed sparingly.  Pt recently has developed worsening lower back pain with paresthesia radiating to L thigh.  Pain says it makes it difficult to walk, and she feels that her L leg is weaker.  Pt has been unable to lay flat, or stand 2/2 the pain and has spent most of her time sitting.  She has been taking percocet without relief.  She has chronic incontinence, but no new symptoms.  She has been constipated which she attributes to taking percocet more frequently.  She has not had foot numbness or saddle anesthesia.  She had imaging done 2 weeks ago including MRI which showed an infiltrative process at L2 vertebral body, lumbar spinal stenosis and RP soft tissue mass.  CT a/p showed signs suspicious of uterine ca with mets to bone and lung.  Pt was referred to an oncologist with appointment scheduled later this week, but due to increasing severity of pain, she was advised to come to the ED by her PCP.   She underwent a CT guided biopsy of the uterine mass at [facility] originally, which showed metastatic adenocarcinoma. Patient at [facility] underwent a L2 Corpectomy with T11-L4 fusion with Dr. Caceres on 9/19. As per Gyn-Onc, she is not a surgical candidate at this time.          REVIEW OF SYSTEMS: + back pain, +R calf pain since surgery, No chest pain, shortness of breath, nausea, vomiting or diarrhea other ROS neg     PAST MEDICAL & SURGICAL HISTORY  Lumbar spinal stenosis  Prediabetes  Essential (primary) hypertension  No pertinent past medical history  S/P right knee arthroscopy  S/P dilation and curettage  S/P cholecystectomy  No significant past surgical history        FUNCTIONAL HISTORY:   Lives in apartment with no MAYI and elevator to 6th floor.   Independent in ambulation and ADLs prior with [facility], but needed additional help for week before admission    CURRENT FUNCTIONAL STATUS:  Bed Mobility: mod assist x 2    FAMILY HISTORY   Family history of acute myocardial infarction (Mother)  Family history of diabetes mellitus (Mother, Sibling)      RECENT LABS/IMAGING  CBC Full  -  ( 21 Sep 2018 07:55 )  WBC Count : 18.72 K/uL  Hemoglobin : 9.5 g/dL  Hematocrit : 29.2 %  Platelet Count - Automated : 171 K/uL  Mean Cell Volume : 89.6 fl  Mean Cell Hemoglobin : 29.1 pg  Mean Cell Hemoglobin Concentration : 32.5 gm/dL  Auto Neutrophil # : 17.44 K/uL  Auto Lymphocyte # : 0.92 K/uL  Auto Monocyte # : 0.26 K/uL  Auto Eosinophil # : 0.00 K/uL  Auto Basophil # : 0.01 K/uL  Auto Neutrophil % : 93.1 %  Auto Lymphocyte % : 4.9 %  Auto Monocyte % : 1.4 %  Auto Eosinophil % : 0.0 %  Auto Basophil % : 0.1 %    09-21    135  |  104  |  30<H>  ----------------------------<  130<H>  4.6   |  24  |  0.72    Ca    9.4      21 Sep 2018 07:08    CT L-Spine: Redemonstration of large lytic destructive process involving the L2   vertebral body. Similar appearing 6.4 x 4.2 cm destructive soft tissue   mass approximating the left aspect of the L2 vertebral body.    Interval L2 corpectomy, L1 and L2 total laminectomies, and posterior   fusion of T11-L4 as described. Hardware is well-placed. No evidence of   hardware loosening.    Evaluation of the spinal canal contents is markedly limited by CT   modality and streak artifact from spinal hardware.    MRI L-Spine: Expansile metastatic infiltration of vertebral body of L2 which is   destroyed with associated large left paraspinal and epidural soft tissue   component extending into the left neural foramen and spinal canal   compressing the descending roots resulting in moderate central canal,   severe left lateral recess and foraminal stenosis. Soft tissue mass   extends into the left hemipelvis / iliopsoas region.    Partially imaged heterogeneous mass in the pelvis likely of uterine   origin.    Fullness in the renal collecting system, left greater than right,   hydronephrosis versus parapelvic cysts, as this region is partially   imaged correlation with dedicated abdominal/pelvic imaging is suggested        VITALS  T(C): 36.6 (09-21-18 @ 08:40), Max: 36.6 (09-20-18 @ 21:15)  HR: 79 (09-21-18 @ 08:40) (69 - 85)  BP: 126/68 (09-21-18 @ 08:40) (106/66 - 141/75)  RR: 18 (09-21-18 @ 08:40) (18 - 18)  SpO2: 100% (09-21-18 @ 08:40) (98% - 100%)    ALLERGIES  No Known Allergies      MEDICATIONS   acetaminophen   Tablet .. 650 milliGRAM(s) Oral every 6 hours PRN  cyclobenzaprine 5 milliGRAM(s) Oral three times a day  dexamethasone  Injectable 4 milliGRAM(s) IV Push every 6 hours  dextrose 40% Gel 15 Gram(s) Oral once PRN  dextrose 50% Injectable 12.5 Gram(s) IV Push once  dextrose 50% Injectable 25 Gram(s) IV Push once  dextrose 50% Injectable 25 Gram(s) IV Push once  diphenhydrAMINE   Capsule 25 milliGRAM(s) Oral every 24 hours PRN  docusate sodium 100 milliGRAM(s) Oral three times a day  enoxaparin Injectable 40 milliGRAM(s) SubCutaneous at bedtime  glucagon  Injectable 1 milliGRAM(s) IntraMuscular once PRN  HYDROmorphone PCA (1 mG/mL) 30 milliLiter(s) PCA Continuous PCA Continuous  HYDROmorphone PCA (1 mG/mL) Rescue Clinician Bolus 1 milliGRAM(s) IV Push every 1 hour PRN  influenza   Vaccine 0.5 milliLiter(s) IntraMuscular once  insulin lispro (HumaLOG) corrective regimen sliding scale   SubCutaneous three times a day before meals  insulin lispro (HumaLOG) corrective regimen sliding scale   SubCutaneous at bedtime  lactulose Syrup 20 Gram(s) Oral every 6 hours  losartan 50 milliGRAM(s) Oral daily  naloxone Injectable 0.1 milliGRAM(s) IV Push every 3 minutes PRN  nystatin Powder 1 Application(s) Topical two times a day  ondansetron Injectable 4 milliGRAM(s) IV Push every 6 hours PRN  polyethylene glycol 3350 17 Gram(s) Oral every 12 hours  senna 2 Tablet(s) Oral at bedtime  sodium chloride 0.9%. 1000 milliLiter(s) IV Continuous <Continuous>      ----------------------------------------------------------------------------------------  PHYSICAL EXAM  Constitutional - NAD, Comfortable, in TLSO  HEENT - NCAT, EOMI  Neck - Supple  Chest - CTA bilaterally, No wheeze  Cardiovascular -  S1S2, No murmurs  Abdomen - BS+, Soft  Extremities -  Mild R calf tenderness  Neurologic Exam -                    Cognitive - Awake, Alert, AAO to self, place, date, year, situation     Communication - Fluent, No dysarthria, no aphasia     Cranial Nerves - CN 2-12 intact     Motor - UE abduction limited due to pain, but 5/5 strength in elbow flexion/extension and  strength in b/l UE, grossly 3/5 strength in LLE, 2/5 strength in RLE proximally, 3/5 distally     Sensory - Decreased to light touch in bilateral LE     Reflexes - DTR Intact  Psychiatric - Mood stable, Affect WNL      Impression:  73 y.o. F presented with worsening back pain, found to have L2 lesion likely from uterine cancer metastasis, s/p L2 corpectomy and T11-L4 fusion, now with gait and ADL dysfunctions.     Plan:  PT- ROM, Bed Mob, Transfers, Amb w AD and bracing as needed  OT- ADLs, bracing if needed  SLP- Dysphagia eval and treat  Prec- Falls, Spinal  DVT Prophylaxis- Lovenox  Skin- Turn q2 h, daily skin checks  Dispo- Will f/u rehab needs

## 2018-09-21 NOTE — PROGRESS NOTE ADULT - SUBJECTIVE AND OBJECTIVE BOX
INTERVAL HPI/OVERNIGHT EVENTS:  Patient S&E at bedside. No o/n events, patient resting comfortably, pain currently controlled on opiate.     VITAL SIGNS:  T(F): 98 (09-21-18 @ 15:22)  HR: 61 (09-21-18 @ 15:22)  BP: 118/63 (09-21-18 @ 15:22)  RR: 18 (09-21-18 @ 15:22)  SpO2: 98% (09-21-18 @ 15:22)  Wt(kg): --    PHYSICAL EXAM:  Constitutional: NAD  Eyes: EOMI, sclera non-icteric  Neck: supple, no JVD  Gastrointestinal: ND  Extremities: no c/c/e; in back brace  Neurological: AAOx3    MEDICATIONS  (STANDING):  dexamethasone  Injectable 3 milliGRAM(s) IV Push every 6 hours  dextrose 50% Injectable 12.5 Gram(s) IV Push once  dextrose 50% Injectable 25 Gram(s) IV Push once  dextrose 50% Injectable 25 Gram(s) IV Push once  docusate sodium 100 milliGRAM(s) Oral three times a day  enoxaparin Injectable 40 milliGRAM(s) SubCutaneous at bedtime  gabapentin 100 milliGRAM(s) Oral three times a day  HYDROmorphone PCA (1 mG/mL) 30 milliLiter(s) PCA Continuous PCA Continuous  influenza   Vaccine 0.5 milliLiter(s) IntraMuscular once  insulin lispro (HumaLOG) corrective regimen sliding scale   SubCutaneous three times a day before meals  insulin lispro (HumaLOG) corrective regimen sliding scale   SubCutaneous at bedtime  lactulose Syrup 20 Gram(s) Oral every 6 hours  losartan 50 milliGRAM(s) Oral daily  nystatin Powder 1 Application(s) Topical two times a day  polyethylene glycol 3350 17 Gram(s) Oral every 12 hours  senna 2 Tablet(s) Oral at bedtime  sodium chloride 0.9%. 1000 milliLiter(s) (100 mL/Hr) IV Continuous <Continuous>    MEDICATIONS  (PRN):  acetaminophen   Tablet .. 650 milliGRAM(s) Oral every 6 hours PRN Temp greater or equal to 38C (100.4F), Mild Pain (1 - 3)  cyclobenzaprine 5 milliGRAM(s) Oral three times a day PRN Muscle Spasm  dextrose 40% Gel 15 Gram(s) Oral once PRN Blood Glucose LESS THAN 70 milliGRAM(s)/deciliter  diphenhydrAMINE   Capsule 25 milliGRAM(s) Oral every 24 hours PRN Insomnia  glucagon  Injectable 1 milliGRAM(s) IntraMuscular once PRN Glucose LESS THAN 70 milligrams/deciliter  HYDROmorphone PCA (1 mG/mL) Rescue Clinician Bolus 1.5 milliGRAM(s) IV Push every 1 hour PRN for pain unrelieved with PCA demand dose  naloxone Injectable 0.1 milliGRAM(s) IV Push every 3 minutes PRN For ANY of the following changes in patient status:  A. RR LESS THAN 10 breaths per minute, B. Oxygen saturation LESS THAN 90%, C. Sedation score of 6  ondansetron Injectable 4 milliGRAM(s) IV Push every 6 hours PRN Nausea and/or Vomiting      Allergies  No Known Allergies    LABS:                        9.5    18.72 )-----------( 171      ( 21 Sep 2018 07:55 )             29.2     09-21    135  |  104  |  30<H>  ----------------------------<  130<H>  4.6   |  24  |  0.72    Ca    9.4      21 Sep 2018 07:08            RADIOLOGY & ADDITIONAL TESTS:  Studies reviewed.    ASSESSMENT & PLAN:

## 2018-09-21 NOTE — PROGRESS NOTE ADULT - SUBJECTIVE AND OBJECTIVE BOX
GAP TEAM PALLIATIVE CARE PROGRESS NOTE:     INTERVAL HPI/OVERNIGHT EVENTS: Patient today seen while sitting in chair. Patient endorsing 7-8/10 pain at her back with movement, and 4-5/10 at rest. Used a total of 14 demand doses (6 mg of dilaudid IV) in last 24 hours. Also has burning R calf pain, 8/10 constantly without much relief with PCA.     DNR on chart:   Allergies    No Known Allergies    Intolerances    MEDICATIONS  (STANDING):  cyclobenzaprine 5 milliGRAM(s) Oral three times a day  dexamethasone  Injectable 3 milliGRAM(s) IV Push every 6 hours  dextrose 50% Injectable 12.5 Gram(s) IV Push once  dextrose 50% Injectable 25 Gram(s) IV Push once  dextrose 50% Injectable 25 Gram(s) IV Push once  docusate sodium 100 milliGRAM(s) Oral three times a day  enoxaparin Injectable 40 milliGRAM(s) SubCutaneous at bedtime  gabapentin 100 milliGRAM(s) Oral three times a day  HYDROmorphone PCA (1 mG/mL) 30 milliLiter(s) PCA Continuous PCA Continuous  influenza   Vaccine 0.5 milliLiter(s) IntraMuscular once  insulin lispro (HumaLOG) corrective regimen sliding scale   SubCutaneous three times a day before meals  insulin lispro (HumaLOG) corrective regimen sliding scale   SubCutaneous at bedtime  lactulose Syrup 20 Gram(s) Oral every 6 hours  losartan 50 milliGRAM(s) Oral daily  nystatin Powder 1 Application(s) Topical two times a day  polyethylene glycol 3350 17 Gram(s) Oral every 12 hours  senna 2 Tablet(s) Oral at bedtime  sodium chloride 0.9%. 1000 milliLiter(s) (100 mL/Hr) IV Continuous <Continuous>    MEDICATIONS  (PRN):  acetaminophen   Tablet .. 650 milliGRAM(s) Oral every 6 hours PRN Temp greater or equal to 38C (100.4F), Mild Pain (1 - 3)  dextrose 40% Gel 15 Gram(s) Oral once PRN Blood Glucose LESS THAN 70 milliGRAM(s)/deciliter  diphenhydrAMINE   Capsule 25 milliGRAM(s) Oral every 24 hours PRN Insomnia  glucagon  Injectable 1 milliGRAM(s) IntraMuscular once PRN Glucose LESS THAN 70 milligrams/deciliter  HYDROmorphone PCA (1 mG/mL) Rescue Clinician Bolus 1.5 milliGRAM(s) IV Push every 1 hour PRN for pain unrelieved with PCA demand dose  naloxone Injectable 0.1 milliGRAM(s) IV Push every 3 minutes PRN For ANY of the following changes in patient status:  A. RR LESS THAN 10 breaths per minute, B. Oxygen saturation LESS THAN 90%, C. Sedation score of 6  ondansetron Injectable 4 milliGRAM(s) IV Push every 6 hours PRN Nausea and/or Vomiting    ITEMS UNCHECKED ARE NOT PRESENT    PRESENT SYMPTOMS: [ ]Unable to obtain due to poor mentation   Source if other than patient:  [ ]Family   [ ]Team     Pain (Impact on QOL):  limits functional status  Location:     lower back and R calf  Minimal acceptable level (0-10 scale): 4/10                    Aggravating factors: movement  Quality: burning (calf pain)  Radiation:  Severity (0-10 scale): up to 8/10    Dyspnea:                           [ ]Mild [ ]Moderate [ ]Severe  Anxiety:                             [ ]Mild [ ]Moderate [ ]Severe  Fatigue:                             [ ]Mild [ ]Moderate [ ]Severe  Nausea:                             [ ]Mild [ ]Moderate [ ]Severe  Loss of appetite:              [ ]Mild [ ]Moderate [ ]Severe  Constipation:                    [ ]Mild [ ]Moderate [ ]Severe    PAINAD Score:    [email or web address] (Ctrl +  left click to view)  		  Other Symptoms:  [ ]All other review of systems negative     Karnofsky Performance Score/Palliative Performance Status Version 2:         %        http://palliative.info/resource_material/[email or web address].pdf    PHYSICAL EXAM:  Vital Signs Last 24 Hrs  T(C): 36.3 (20 Sep 2018 14:00), Max: 36.9 (19 Sep 2018 22:18)  T(F): 97.4 (20 Sep 2018 14:00), Max: 98.4 (19 Sep 2018 22:18)  HR: 77 (20 Sep 2018 14:00) (65 - 87)  BP: 106/66 (20 Sep 2018 14:00) (106/66 - 151/80)  BP(mean): --  RR: 18 (20 Sep 2018 14:00) (18 - 18)  SpO2: 100% (20 Sep 2018 14:00) (96% - 100%)    GENERAL:  [X ]Alert  [X ]Oriented x 3  [ ]Lethargic  [ ]Cachexia  [ ]Unarousable  [ ]Verbal  [ ]Non-Verbal  Behavioral:   [ X] Anxiety  [ ] Delirium [ ] Agitation [ ] Other  HEENT:  [ ]Normal   [ ]Dry mouth   [ ]ET Tube/Trach  [ ]Oral lesions  PULMONARY:   [X ]Clear [ ]Tachypnea  [ ]Audible excessive secretions   [ ]Rhonchi        [ ]Right [ ]Left [ ]Bilateral  [ ]Crackles        [ ]Right [ ]Left [ ]Bilateral  [ ]Wheezing     [ ]Right [ ]Left [ ]Bilateral  CARDIOVASCULAR:    [X ]Regular [ ]Irregular [ ]Tachy  [ ]Spencer [ ]Murmur [ ]Other  GASTROINTESTINAL:  [X ]Soft  [ ]Distended   [X ]+BS  [ X]Non tender [ ]Tender  [ ]PEG [ ]OGT/ NGT   Last BM:   9/20/18  GENITOURINARY:  [ ]Normal [ ] Incontinent   [ ]Oliguria/Anuria   [ ]Martinez  MUSCULOSKELETAL:   [ X]Normal   [ ]Weakness  [ ]Bed/Wheelchair bound [ ]Edema  NEUROLOGIC:   [ ]No focal deficits  [ ] Cognitive impairment  [ ] Dysphagia [ ]Dysarthria [ ] Paresis [ ]Other   SKIN:   [ ]Normal   [ ]Pressure ulcer(s)  [ ]Rash    CRITICAL CARE:  [ ] Shock Present  [ ]Septic [ ]Cardiogenic [ ]Neurologic [ ]Hypovolemic  [ ]  Vasopressors [ ]  Inotropes   [ ] Respiratory failure present  [ ] Acute  [ ] Chronic [ ] Hypoxic  [ ] Hypercarbic [ ] Other  [ ] Other organ failure     LABS:                          9.5    18.72 )-----------( 171      ( 21 Sep 2018 07:55 )             29.2     09-21    135  |  104  |  30<H>  ----------------------------<  130<H>  4.6   |  24  |  0.72    Ca    9.4      21 Sep 2018 07:08            RADIOLOGY & ADDITIONAL STUDIES:    PROTEIN CALORIE MALNUTRITION:   [ ] PPSV2 < or = 30% [ ] significant weight loss [ ] poor nutritional intake [ ] anasarca [ ] catabolic state   Albumin, Serum: 3.5 g/dL (09-12-18 @ 23:02)   Artificial Nutrition [ ]     REFERRALS:   [ ]Chaplaincy  [ ] Hospice  [ ]Child Life  [ ]Social Work  [ ]Case management [ ]Holistic Therapy [ ] Physical Therapy [ ] Dietary   Goals of Care Document:

## 2018-09-21 NOTE — PROGRESS NOTE ADULT - SUBJECTIVE AND OBJECTIVE BOX
Patient is a 73y old  Female who presents with a chief complaint of L2 lesion (21 Sep 2018 12:53)      SUBJECTIVE / OVERNIGHT EVENTS: Pain better controlled. Had a bowel movement.     MEDICATIONS  (STANDING):  dexamethasone  Injectable 3 milliGRAM(s) IV Push every 6 hours  dextrose 50% Injectable 12.5 Gram(s) IV Push once  dextrose 50% Injectable 25 Gram(s) IV Push once  dextrose 50% Injectable 25 Gram(s) IV Push once  docusate sodium 100 milliGRAM(s) Oral three times a day  enoxaparin Injectable 40 milliGRAM(s) SubCutaneous at bedtime  gabapentin 100 milliGRAM(s) Oral three times a day  HYDROmorphone PCA (1 mG/mL) 30 milliLiter(s) PCA Continuous PCA Continuous  influenza   Vaccine 0.5 milliLiter(s) IntraMuscular once  insulin lispro (HumaLOG) corrective regimen sliding scale   SubCutaneous three times a day before meals  insulin lispro (HumaLOG) corrective regimen sliding scale   SubCutaneous at bedtime  lactulose Syrup 20 Gram(s) Oral every 6 hours  losartan 50 milliGRAM(s) Oral daily  nystatin Powder 1 Application(s) Topical two times a day  polyethylene glycol 3350 17 Gram(s) Oral every 12 hours  senna 2 Tablet(s) Oral at bedtime  sodium chloride 0.9%. 1000 milliLiter(s) (100 mL/Hr) IV Continuous <Continuous>    MEDICATIONS  (PRN):  acetaminophen   Tablet .. 650 milliGRAM(s) Oral every 6 hours PRN Temp greater or equal to 38C (100.4F), Mild Pain (1 - 3)  cyclobenzaprine 5 milliGRAM(s) Oral three times a day PRN Muscle Spasm  dextrose 40% Gel 15 Gram(s) Oral once PRN Blood Glucose LESS THAN 70 milliGRAM(s)/deciliter  diphenhydrAMINE   Capsule 25 milliGRAM(s) Oral every 24 hours PRN Insomnia  glucagon  Injectable 1 milliGRAM(s) IntraMuscular once PRN Glucose LESS THAN 70 milligrams/deciliter  HYDROmorphone PCA (1 mG/mL) Rescue Clinician Bolus 1.5 milliGRAM(s) IV Push every 1 hour PRN for pain unrelieved with PCA demand dose  naloxone Injectable 0.1 milliGRAM(s) IV Push every 3 minutes PRN For ANY of the following changes in patient status:  A. RR LESS THAN 10 breaths per minute, B. Oxygen saturation LESS THAN 90%, C. Sedation score of 6  ondansetron Injectable 4 milliGRAM(s) IV Push every 6 hours PRN Nausea and/or Vomiting      Vital Signs Last 24 Hrs  T(C): 36.6 (21 Sep 2018 08:40), Max: 36.6 (20 Sep 2018 21:15)  T(F): 97.8 (21 Sep 2018 08:40), Max: 97.9 (21 Sep 2018 01:45)  HR: 100 (21 Sep 2018 10:56) (69 - 100)  BP: 121/76 (21 Sep 2018 10:56) (106/66 - 129/59)  BP(mean): --  RR: 18 (21 Sep 2018 08:40) (18 - 18)  SpO2: 97% (21 Sep 2018 10:56) (95% - 100%)  CAPILLARY BLOOD GLUCOSE      POCT Blood Glucose.: 175 mg/dL (21 Sep 2018 12:47)  POCT Blood Glucose.: 158 mg/dL (21 Sep 2018 08:27)  POCT Blood Glucose.: 140 mg/dL (20 Sep 2018 22:09)  POCT Blood Glucose.: 161 mg/dL (20 Sep 2018 17:19)  POCT Blood Glucose.: 198 mg/dL (20 Sep 2018 13:13)    I&O's Summary    20 Sep 2018 07:01  -  21 Sep 2018 07:00  --------------------------------------------------------  IN: 3100 mL / OUT: 945 mL / NET: 2155 mL    21 Sep 2018 07:01  -  21 Sep 2018 13:05  --------------------------------------------------------  IN: 100 mL / OUT: 0 mL / NET: 100 mL        PHYSICAL EXAM:  GENERAL: NAD  HEAD:  Atraumatic, Normocephalic  EYES: EOMI, PERRLA, conjunctiva and sclera clear  NECK: Supple, No JVD  CHEST/LUNG: Clear to auscultation bilaterally; No wheeze  HEART: Regular rate and rhythm; S1S2  ABDOMEN: Soft, Nontender, obese; Bowel sounds present  EXTREMITIES:  2+ Peripheral Pulses, No clubbing, cyanosis, +LE edema L>R  PSYCH: AAOx3  NEUROLOGY: CN's intact, LE weakness L>R  SKIN: No lesions, leg erythema significantly improved     LABS:                        9.5    18.72 )-----------( 171      ( 21 Sep 2018 07:55 )             29.2     09-21    135  |  104  |  30<H>  ----------------------------<  130<H>  4.6   |  24  |  0.72    Ca    9.4      21 Sep 2018 07:08                RADIOLOGY & ADDITIONAL TESTS:    Imaging Personally Reviewed:    Consultant(s) Notes Reviewed:      Care Discussed with Consultants/Other Providers:

## 2018-09-21 NOTE — PROGRESS NOTE ADULT - SUBJECTIVE AND OBJECTIVE BOX
Plastic Surgery    SUBJECTIVE:   Patient complaining of pain and inability to easily move around in bed    VITALS  T(C): 36.4 (09-21-18 @ 05:01), Max: 36.6 (09-20-18 @ 21:15)  HR: 77 (09-21-18 @ 05:01) (65 - 85)  BP: 126/64 (09-21-18 @ 05:01) (106/66 - 141/75)  RR: 18 (09-21-18 @ 05:01) (18 - 18)  SpO2: 100% (09-21-18 @ 05:01) (98% - 100%)  CAPILLARY BLOOD GLUCOSE      POCT Blood Glucose.: 140 mg/dL (20 Sep 2018 22:09)  POCT Blood Glucose.: 161 mg/dL (20 Sep 2018 17:19)  POCT Blood Glucose.: 198 mg/dL (20 Sep 2018 13:13)  POCT Blood Glucose.: 167 mg/dL (20 Sep 2018 09:41)      Is/Os    09-20 @ 07:01  -  09-21 @ 07:00  --------------------------------------------------------  IN:    Oral Fluid: 800 mL    sodium chloride 0.9%.: 2300 mL  Total IN: 3100 mL    OUT:    Drain: 40 mL    Drain: 280 mL    Voided: 625 mL  Total OUT: 945 mL    Total NET: 2155 mL          PHYSICAL EXAM:   General: NAD, Lying in bed   Back: Dressing C/D/I. No collections. Drains SS    MEDICATIONS (STANDING): cyclobenzaprine 5 milliGRAM(s) Oral three times a day  dexamethasone  Injectable 4 milliGRAM(s) IV Push every 6 hours  dextrose 50% Injectable 12.5 Gram(s) IV Push once  dextrose 50% Injectable 25 Gram(s) IV Push once  dextrose 50% Injectable 25 Gram(s) IV Push once  docusate sodium 100 milliGRAM(s) Oral three times a day  enoxaparin Injectable 40 milliGRAM(s) SubCutaneous at bedtime  HYDROmorphone PCA (1 mG/mL) 30 milliLiter(s) PCA Continuous PCA Continuous  influenza   Vaccine 0.5 milliLiter(s) IntraMuscular once  insulin lispro (HumaLOG) corrective regimen sliding scale   SubCutaneous three times a day before meals  insulin lispro (HumaLOG) corrective regimen sliding scale   SubCutaneous at bedtime  lactulose Syrup 20 Gram(s) Oral every 6 hours  losartan 50 milliGRAM(s) Oral daily  polyethylene glycol 3350 17 Gram(s) Oral every 12 hours  senna 2 Tablet(s) Oral at bedtime  sodium chloride 0.9%. 1000 milliLiter(s) IV Continuous <Continuous>    MEDICATIONS (PRN):acetaminophen   Tablet .. 650 milliGRAM(s) Oral every 6 hours PRN Temp greater or equal to 38C (100.4F), Mild Pain (1 - 3)  dextrose 40% Gel 15 Gram(s) Oral once PRN Blood Glucose LESS THAN 70 milliGRAM(s)/deciliter  diphenhydrAMINE   Capsule 25 milliGRAM(s) Oral every 24 hours PRN Insomnia  glucagon  Injectable 1 milliGRAM(s) IntraMuscular once PRN Glucose LESS THAN 70 milligrams/deciliter  HYDROmorphone PCA (1 mG/mL) Rescue Clinician Bolus 1 milliGRAM(s) IV Push every 1 hour PRN for pain unrelieved with PCA demand dose  naloxone Injectable 0.1 milliGRAM(s) IV Push every 3 minutes PRN For ANY of the following changes in patient status:  A. RR LESS THAN 10 breaths per minute, B. Oxygen saturation LESS THAN 90%, C. Sedation score of 6  ondansetron Injectable 4 milliGRAM(s) IV Push every 6 hours PRN Nausea and/or Vomiting      LABS  CBC (09-20 @ 07:45)                              9.5<L>                         22.71<H>  )----------------(  181        90.4<H>% Neutrophils, 4.6<L>% Lymphocytes, ANC: 20.54<H>                              30.5<L>    BMP (09-20 @ 06:53)             137     |  107     |  31<H> 		Ca++ --      Ca 9.3                ---------------------------------( 154<H>		Mg --                 4.4     |  21<L>   |  0.75  			Ph --                    IMAGING STUDIES

## 2018-09-21 NOTE — PROGRESS NOTE ADULT - PROBLEM SELECTOR PLAN 3
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

## 2018-09-21 NOTE — PROGRESS NOTE ADULT - ASSESSMENT
73F with PMH of HTN, spinal stenosis, chronic back pain here with worsening pain. Pain started in early 2017 with R-sided sciatica, resulting in diagnosis of lumbar spinal stenosis, and has used gabapentin and percocet at home. Pain subsequently worsened, causing difficulty walking and difficulty being supine, and now has parasthesias radiating to her L thigh. Also with constipation. CT A/P showed concern for new metastatic uterine CA to bone and lung, and came to [facility] from [facility] for management of her spinal lesion, now s/p T11-L4 fusion, and L2 corpectomy.

## 2018-09-21 NOTE — PROGRESS NOTE ADULT - PROBLEM SELECTOR PLAN 2
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

## 2018-09-21 NOTE — PROGRESS NOTE ADULT - PROBLEM SELECTOR PLAN 4
Cellulitis of LLE diagnosed at [facility], resolved   Completed course of Ancef   Blood cultures NGTD  LE dopplers negative for DVT.

## 2018-09-21 NOTE — PROGRESS NOTE ADULT - ASSESSMENT
HPI:  Patient is a 73 year old female with chronic back pain which progressively worsened.  Began to develop parasthesias to the leg thigh as well as left leg weakness.  MRI was done which showed infiltrative process at L@ vertebral body.  CT scan then done which showed possible uterine cancer with mets.  Now presented to ED for increased severity of pain.    PROCEDURE: s/p angiogram and embolization of L2 lesion on 9/17/2018, now s/p L2 corpectomy and T11-L4 posterior instrumentation and fusion on 9/18/2018  PAD#4, POD#3    PLAN:  -dilaudid pca for pain control, will contact palliative to see if pca can be transitioned to oral pain medications  -normal saline at 100 cc/hr for hydration  -flexeril for muscle spasms  -decadron tapered to 3q6  -lovenox and SCDs for DVT prophylaxis  -senna, colace, miralax, lactulose, and magnesium citrate for bowel regimen  -HISS for glucose control  -consistent carbohydrate diet  -continue hemovacs, monitor outputs  -plastic surgery to change incision dressing on 9/22/2018  -out of bed with assistance  -TLSO brace for comfort when out of bed  -incentive spirometer for lung expansion  -pt - acute rehab upon discharge  -ot consult pending  -am labs  -patient seen and discussed w/ Dr. Caceres    Spectra #[id number]

## 2018-09-21 NOTE — PROGRESS NOTE ADULT - ASSESSMENT
A/P 73F s/p T12-L4 fusion and plastics closure POD3  - Care per neurosurgery  - Will change dressing tomorrow  - Drain care  - Pain control  Rhoda Jaeger PGY3

## 2018-09-22 LAB
GLUCOSE BLDC GLUCOMTR-MCNC: 139 MG/DL — HIGH (ref 70–99)
GLUCOSE BLDC GLUCOMTR-MCNC: 151 MG/DL — HIGH (ref 70–99)
GLUCOSE BLDC GLUCOMTR-MCNC: 161 MG/DL — HIGH (ref 70–99)
GLUCOSE BLDC GLUCOMTR-MCNC: 179 MG/DL — HIGH (ref 70–99)

## 2018-09-22 PROCEDURE — 99233 SBSQ HOSP IP/OBS HIGH 50: CPT

## 2018-09-22 RX ORDER — ENOXAPARIN SODIUM 100 MG/ML
30 INJECTION SUBCUTANEOUS EVERY 12 HOURS
Qty: 0 | Refills: 0 | Status: DISCONTINUED | OUTPATIENT
Start: 2018-09-22 | End: 2018-09-27

## 2018-09-22 RX ADMIN — HYDROMORPHONE HYDROCHLORIDE 30 MILLILITER(S): 2 INJECTION INTRAMUSCULAR; INTRAVENOUS; SUBCUTANEOUS at 06:26

## 2018-09-22 RX ADMIN — GABAPENTIN 100 MILLIGRAM(S): 400 CAPSULE ORAL at 22:11

## 2018-09-22 RX ADMIN — GABAPENTIN 100 MILLIGRAM(S): 400 CAPSULE ORAL at 06:16

## 2018-09-22 RX ADMIN — Medication 1: at 13:08

## 2018-09-22 RX ADMIN — GABAPENTIN 100 MILLIGRAM(S): 400 CAPSULE ORAL at 13:08

## 2018-09-22 RX ADMIN — HYDROMORPHONE HYDROCHLORIDE 30 MILLILITER(S): 2 INJECTION INTRAMUSCULAR; INTRAVENOUS; SUBCUTANEOUS at 14:04

## 2018-09-22 RX ADMIN — HYDROMORPHONE HYDROCHLORIDE 30 MILLILITER(S): 2 INJECTION INTRAMUSCULAR; INTRAVENOUS; SUBCUTANEOUS at 00:01

## 2018-09-22 RX ADMIN — Medication 3 MILLIGRAM(S): at 06:16

## 2018-09-22 RX ADMIN — HYDROMORPHONE HYDROCHLORIDE 30 MILLILITER(S): 2 INJECTION INTRAMUSCULAR; INTRAVENOUS; SUBCUTANEOUS at 17:58

## 2018-09-22 RX ADMIN — HYDROMORPHONE HYDROCHLORIDE 30 MILLILITER(S): 2 INJECTION INTRAMUSCULAR; INTRAVENOUS; SUBCUTANEOUS at 22:15

## 2018-09-22 RX ADMIN — Medication 650 MILLIGRAM(S): at 07:00

## 2018-09-22 RX ADMIN — SODIUM CHLORIDE 100 MILLILITER(S): 9 INJECTION INTRAMUSCULAR; INTRAVENOUS; SUBCUTANEOUS at 12:50

## 2018-09-22 RX ADMIN — ENOXAPARIN SODIUM 30 MILLIGRAM(S): 100 INJECTION SUBCUTANEOUS at 22:12

## 2018-09-22 RX ADMIN — Medication 100 MILLIGRAM(S): at 13:12

## 2018-09-22 RX ADMIN — HYDROMORPHONE HYDROCHLORIDE 30 MILLILITER(S): 2 INJECTION INTRAMUSCULAR; INTRAVENOUS; SUBCUTANEOUS at 03:29

## 2018-09-22 RX ADMIN — POLYETHYLENE GLYCOL 3350 17 GRAM(S): 17 POWDER, FOR SOLUTION ORAL at 17:24

## 2018-09-22 RX ADMIN — HYDROMORPHONE HYDROCHLORIDE 30 MILLILITER(S): 2 INJECTION INTRAMUSCULAR; INTRAVENOUS; SUBCUTANEOUS at 19:17

## 2018-09-22 RX ADMIN — Medication 100 MILLIGRAM(S): at 22:11

## 2018-09-22 RX ADMIN — LACTULOSE 20 GRAM(S): 10 SOLUTION ORAL at 06:16

## 2018-09-22 RX ADMIN — NYSTATIN CREAM 1 APPLICATION(S): 100000 CREAM TOPICAL at 17:24

## 2018-09-22 RX ADMIN — LACTULOSE 20 GRAM(S): 10 SOLUTION ORAL at 11:37

## 2018-09-22 RX ADMIN — Medication 100 MILLIGRAM(S): at 06:17

## 2018-09-22 RX ADMIN — ENOXAPARIN SODIUM 30 MILLIGRAM(S): 100 INJECTION SUBCUTANEOUS at 11:38

## 2018-09-22 RX ADMIN — SENNA PLUS 2 TABLET(S): 8.6 TABLET ORAL at 22:11

## 2018-09-22 RX ADMIN — HYDROMORPHONE HYDROCHLORIDE 30 MILLILITER(S): 2 INJECTION INTRAMUSCULAR; INTRAVENOUS; SUBCUTANEOUS at 07:12

## 2018-09-22 RX ADMIN — Medication 650 MILLIGRAM(S): at 06:26

## 2018-09-22 RX ADMIN — Medication 3 MILLIGRAM(S): at 13:12

## 2018-09-22 RX ADMIN — HYDROMORPHONE HYDROCHLORIDE 30 MILLILITER(S): 2 INJECTION INTRAMUSCULAR; INTRAVENOUS; SUBCUTANEOUS at 10:05

## 2018-09-22 RX ADMIN — Medication 1: at 18:48

## 2018-09-22 RX ADMIN — NYSTATIN CREAM 1 APPLICATION(S): 100000 CREAM TOPICAL at 06:17

## 2018-09-22 RX ADMIN — Medication 3 MILLIGRAM(S): at 17:24

## 2018-09-22 RX ADMIN — LACTULOSE 20 GRAM(S): 10 SOLUTION ORAL at 17:25

## 2018-09-22 RX ADMIN — LOSARTAN POTASSIUM 50 MILLIGRAM(S): 100 TABLET, FILM COATED ORAL at 06:16

## 2018-09-22 NOTE — PROGRESS NOTE ADULT - ASSESSMENT
73F hx HTN, prediabetes, spinal stenosis 2/2 metastatic lesion now POD#4 s/p T12-L4 fusion and plastics closure (9/18)    - Aquacel exchanged, next change 9/27 or prior to d/c  - Cont drains  - Rest of care per primary    Plastic Surgery  [phone]

## 2018-09-22 NOTE — PROGRESS NOTE ADULT - ASSESSMENT
HPI:  Patient is a 73 year old female with chronic back pain which progressively worsened.  Began to develop parasthesias to the leg thigh as well as left leg weakness.  MRI was done which showed infiltrative process at L@ vertebral body.  CT scan then done which showed possible uterine cancer with mets.  Now presented to ED for increased severity of pain.    PROCEDURE: s/p angiogram and embolization of L2 lesion on 9/17/2018, now s/p L2 corpectomy and T11-L4 posterior instrumentation and fusion on 9/18/2018  PAD#5, POD#4    PLAN:  -decadron 3q6  -dilaudid pca for pain control, follow up palliative care recs  -flexeril for muscle spasms  -senna, colace, and miralax for bowel regimen  -HISS for glucose control  -continue hemovacs, monitor outputs  -lovenox and SCDs for DVT prophylaxis  -consistent carbohydrate diet  -out of bed with assistance  -incentive spirometer for lung expansion  -pt - acute rehab upon discharge  -ot eval pending  -am labs    Spectra #[id number]

## 2018-09-22 NOTE — PROGRESS NOTE ADULT - SUBJECTIVE AND OBJECTIVE BOX
SUBJECTIVE AND OBJECTIVE: No acute events overnight.  Resting comfortably,  INTERVAL HPI/OVERNIGHT EVENTS:  Pain controlled on current Dilaudid PCA dose.     DNR on chart:   Allergies    No Known Allergies    Intolerances    MEDICATIONS  (STANDING):  dexamethasone  Injectable 3 milliGRAM(s) IV Push every 6 hours  dextrose 50% Injectable 12.5 Gram(s) IV Push once  dextrose 50% Injectable 25 Gram(s) IV Push once  dextrose 50% Injectable 25 Gram(s) IV Push once  docusate sodium 100 milliGRAM(s) Oral three times a day  enoxaparin Injectable 30 milliGRAM(s) SubCutaneous every 12 hours  gabapentin 100 milliGRAM(s) Oral three times a day  HYDROmorphone PCA (1 mG/mL) 30 milliLiter(s) PCA Continuous PCA Continuous  influenza   Vaccine 0.5 milliLiter(s) IntraMuscular once  insulin lispro (HumaLOG) corrective regimen sliding scale   SubCutaneous three times a day before meals  insulin lispro (HumaLOG) corrective regimen sliding scale   SubCutaneous at bedtime  lactulose Syrup 20 Gram(s) Oral every 6 hours  losartan 50 milliGRAM(s) Oral daily  nystatin Powder 1 Application(s) Topical two times a day  polyethylene glycol 3350 17 Gram(s) Oral every 12 hours  senna 2 Tablet(s) Oral at bedtime    MEDICATIONS  (PRN):  acetaminophen   Tablet .. 650 milliGRAM(s) Oral every 6 hours PRN Temp greater or equal to 38C (100.4F), Mild Pain (1 - 3)  cyclobenzaprine 5 milliGRAM(s) Oral three times a day PRN Muscle Spasm  dextrose 40% Gel 15 Gram(s) Oral once PRN Blood Glucose LESS THAN 70 milliGRAM(s)/deciliter  diphenhydrAMINE   Capsule 25 milliGRAM(s) Oral every 24 hours PRN Insomnia  glucagon  Injectable 1 milliGRAM(s) IntraMuscular once PRN Glucose LESS THAN 70 milligrams/deciliter  HYDROmorphone PCA (1 mG/mL) Rescue Clinician Bolus 1.5 milliGRAM(s) IV Push every 1 hour PRN for pain unrelieved with PCA demand dose  naloxone Injectable 0.1 milliGRAM(s) IV Push every 3 minutes PRN For ANY of the following changes in patient status:  A. RR LESS THAN 10 breaths per minute, B. Oxygen saturation LESS THAN 90%, C. Sedation score of 6  ondansetron Injectable 4 milliGRAM(s) IV Push every 6 hours PRN Nausea and/or Vomiting      ITEMS UNCHECKED ARE NOT PRESENT    PRESENT SYMPTOMS: [ ]Unable to obtain due to poor mentation   Source if other than patient:  [ ]Family   [ ]Team     Pain (Impact on QOL):  Limits movement  Location: generalized back  Minimal acceptable level (0-10 scale): 5            Aggravating factors: movement  Quality: dull, achy  Radiation: none  Severity (0-10 scale):  7  Timing: intermittent    Dyspnea:                           [ ]Mild [ ]Moderate [ ]Severe  Anxiety:                             [ ]Mild [ ]Moderate [ ]Severe  Fatigue:                             [ ]Mild [ ]Moderate [x ]Severe  Nausea:                             [x ]Mild [ ]Moderate [ ]Severe  Loss of appetite:              [ ]Mild [ ]Moderate [x ]Severe  Constipation:                    [x ]Mild [ ]Moderate [ ]Severe    PAIN AD Score:	  [email or web address] (Ctrl + left click to view)    Other Symptoms:  [ ]All other review of systems negative     Karnofsky Performance Score/Palliative Performance Status Version 2:   40-50      %    http://palliative.info/resource_material/[email or web address].pdf  PHYSICAL EXAM:  Vital Signs Last 24 Hrs  T(C): 36.7 (23 Sep 2018 05:41), Max: 36.8 (22 Sep 2018 21:44)  T(F): 98.1 (23 Sep 2018 05:41), Max: 98.2 (22 Sep 2018 21:44)  HR: 66 (23 Sep 2018 05:41) (66 - 77)  BP: 135/71 (23 Sep 2018 05:41) (118/73 - 146/73)  BP(mean): --  RR: 18 (23 Sep 2018 05:41) (16 - 18)  SpO2: 95% (23 Sep 2018 05:41) (93% - 98%) I&O's Summary    21 Sep 2018 07:01  -  22 Sep 2018 07:00  --------------------------------------------------------  IN: 1900 mL / OUT: 1505 mL / NET: 395 mL    22 Sep 2018 07:01  -  23 Sep 2018 06:58  --------------------------------------------------------  IN: 2080 mL / OUT: 1805 mL / NET: 275 mL     GENERAL:  [x ]Alert  [x ]Oriented x3   [ ]Lethargic  [ ]Cachexia  [ ]Unarousable  [ ]Verbal  [ ]Non-Verbal    Behavioral:   [ ] Anxiety  [ ] Delirium [ ] Agitation [ ] Other    HEENT:  [x ]Normal   [ ]Dry mouth   [ ]ET Tube/Trach  [ ]Oral lesions    PULMONARY:   [x ]Clear [ ]Tachypnea  [ ]Audible excessive secretions   [ ]Rhonchi        [ ]Right [ ]Left [ ]Bilateral  [ ]Crackles        [ ]Right [ ]Left [ ]Bilateral  [ ]Wheezing     [ ]Right [ ]Left [ ]Bilateral    CARDIOVASCULAR:    [x ]Regular [ ]Irregular [ ]Tachy  [ ]Spencer [ ]Murmur [ ]Other    GASTROINTESTINAL:  [ x]Soft  [ ]Distended   [x ]+BS  [x ]Non tender [ ]Tender  [ ]PEG [ ]OGT/ NGT   Last BM:    GENITOURINARY:  [ ]Normal [ ] Incontinent   [ ]Oliguria/Anuria   [ ]Martinez    MUSCULOSKELETAL:   [ ]Normal   [ x]Weakness  [ ]Bed/Wheelchair bound [ ]Edema    NEUROLOGIC:   [x ]No focal deficits  [ ] Cognitive impairment  [ ] Dysphagia [ ]Dysarthria [ ] Paresis [ ]Other     SKIN:   [ x]Normal   [ ]Pressure ulcer(s)  [ ]Rash    CRITICAL CARE:  [ ] Shock Present  [ ]Septic [ ]Cardiogenic [ ]Neurologic [ ]Hypovolemic  [ ]  Vasopressors [ ]  Inotropes   [ ] Respiratory failure present  [ ] Acute  [ ] Chronic [ ] Hypoxic  [ ] Hypercarbic [ ] Other  [ ] Other organ failure     LABS:                        9.5    18.72 )-----------( 171      ( 21 Sep 2018 07:55 )             29.2   09-21    135  |  104  |  30<H>  ----------------------------<  130<H>  4.6   |  24  |  0.72    Ca    9.4      21 Sep 2018 07:08          RADIOLOGY & ADDITIONAL STUDIES:    Protein Calorie Malnutrition Present: [ ] yes [ ] no  [ ] PPSV2 < or = 30%  [ ] significant weight loss [ ] poor nutritional intake [ ] anasarca [ ] catabolic state Albumin, Serum: 3.5 g/dL (09-12-18 @ 23:02)  Artificial Nutrition [ ]     REFERRALS:   [ ]Chaplaincy  [ ] Hospice  [ ]Child Life  [ ]Social Work  [ ]Case management [ ]Holistic Therapy   Goals of Care Document:

## 2018-09-22 NOTE — PROGRESS NOTE ADULT - ASSESSMENT
73F with PMH of HTN, spinal stenosis, chronic back pain here with worsening pain. Pain started in early 2017 with R-sided sciatica, resulting in diagnosis of lumbar spinal stenosis, and has used gabapentin and percocet at home. Pain subsequently worsened, causing difficulty walking and difficulty being supine, and now has parasthesias radiating to her L thigh. Also with constipation. CT A/P showed concern for new metastatic uterine CA to bone and lung, and came to [facility] from [facility] for management of her spinal lesion, now s/p T11-L4 fusion, and L2 corpectomy.

## 2018-09-22 NOTE — PROGRESS NOTE ADULT - PROBLEM SELECTOR PLAN 3
- Continue with Dilaudid PCA[0.8CR/0.8DD/20 mins/3.2 per h/1.5Q1 CAB]  - Pain is controlled on current settings.  - gabapentin 100mg TID started for possible neuropathic component to RLE  - change standing Flexaril 5mg TID to PRN  - continue with current bowel regimen  - continue to monitor

## 2018-09-22 NOTE — PROGRESS NOTE ADULT - SUBJECTIVE AND OBJECTIVE BOX
HPI:  Patient is a 73 year old female with chronic back pain which progressively worsened.  Began to develop parasthesias to the leg thigh as well as left leg weakness.  MRI was done which showed infiltrative process at L@ vertebral body.  CT scan then done which showed possible uterine cancer with mets.  Now presented to ED for increased severity of pain.    OVERNIGHT EVENTS:  No acute events overnight.  Was out of bed to chair yesterday.  Dilaudid pca increased by palliative care.  Hemovacs still in place.  Plastic surgery changed dressing this morning.      Vital Signs Last 24 Hrs  T(C): 36.5 (22 Sep 2018 10:07), Max: 36.7 (21 Sep 2018 13:30)  T(F): 97.7 (22 Sep 2018 10:07), Max: 98.1 (22 Sep 2018 05:02)  HR: 76 (22 Sep 2018 10:07) (61 - 100)  BP: 118/73 (22 Sep 2018 10:07) (105/54 - 136/74)  BP(mean): --  RR: 18 (22 Sep 2018 10:07) (18 - 18)  SpO2: 94% (22 Sep 2018 10:07) (94% - 98%)        PHYSICAL EXAM:  Neurological: awake, alert, oriented x3, follows commands, speech clear and fluent, moves UE 5/5 strength throughout b/l  LLE: 2-3/5 HF, 4/5 KF/KE, 5/5 DF/PF  RLE: 4/5 HF/KF/KE, 5/5 DF/PF  sensation present, intact, equal throughout    Cardiovascular: +s1, s2  Respiratory: clear to auscultation b/l  Gastrointestinal: soft, non-distended, non-tender  Genitourinary: +voiding  Extremities: +dp/pt pulses palpable b/l  Incision/Wound:  posterior midline vertical incision dressing c/d/i, hemovac x2    TUBES/LINES:  [x] hemovac x2 (210cc and 45cc serosanguinous per 24 hours)    DIET:  [x] consistent carbohydrate      LABS:                        9.5    18.72 )-----------( 171      ( 21 Sep 2018 07:55 )             29.2     09-21    135  |  104  |  30<H>  ----------------------------<  130<H>  4.6   |  24  |  0.72    Ca    9.4      21 Sep 2018 07:08          CAPILLARY BLOOD GLUCOSE      POCT Blood Glucose.: 139 mg/dL (22 Sep 2018 09:07)  POCT Blood Glucose.: 135 mg/dL (21 Sep 2018 21:44)  POCT Blood Glucose.: 127 mg/dL (21 Sep 2018 17:09)  POCT Blood Glucose.: 175 mg/dL (21 Sep 2018 12:47)        Allergies    No Known Allergies        MEDICATIONS:  Antibiotics:  none    Neuro:  acetaminophen   Tablet .. 650 milliGRAM(s) Oral every 6 hours PRN  cyclobenzaprine 5 milliGRAM(s) Oral three times a day PRN  diphenhydrAMINE   Capsule 25 milliGRAM(s) Oral every 24 hours PRN  gabapentin 100 milliGRAM(s) Oral three times a day  HYDROmorphone PCA (1 mG/mL) 30 milliLiter(s) PCA Continuous PCA Continuous  HYDROmorphone PCA (1 mG/mL) Rescue Clinician Bolus 1.5 milliGRAM(s) IV Push every 1 hour PRN  ondansetron Injectable 4 milliGRAM(s) IV Push every 6 hours PRN    Anticoagulation:  enoxaparin Injectable 40 milliGRAM(s) SubCutaneous at bedtime    OTHER:  dexamethasone  Injectable 3 milliGRAM(s) IV Push every 6 hours  dextrose 40% Gel 15 Gram(s) Oral once PRN  dextrose 50% Injectable 12.5 Gram(s) IV Push once  dextrose 50% Injectable 25 Gram(s) IV Push once  dextrose 50% Injectable 25 Gram(s) IV Push once  docusate sodium 100 milliGRAM(s) Oral three times a day  glucagon  Injectable 1 milliGRAM(s) IntraMuscular once PRN  influenza   Vaccine 0.5 milliLiter(s) IntraMuscular once  insulin lispro (HumaLOG) corrective regimen sliding scale   SubCutaneous three times a day before meals  insulin lispro (HumaLOG) corrective regimen sliding scale   SubCutaneous at bedtime  lactulose Syrup 20 Gram(s) Oral every 6 hours  losartan 50 milliGRAM(s) Oral daily  naloxone Injectable 0.1 milliGRAM(s) IV Push every 3 minutes PRN  nystatin Powder 1 Application(s) Topical two times a day  polyethylene glycol 3350 17 Gram(s) Oral every 12 hours  senna 2 Tablet(s) Oral at bedtime    IVF:  sodium chloride 0.9%. 1000 milliLiter(s) IV Continuous <Continuous>    CULTURES:  none    RADIOLOGY & ADDITIONAL TESTS:  none

## 2018-09-23 LAB
ANION GAP SERPL CALC-SCNC: 8 MMOL/L — SIGNIFICANT CHANGE UP (ref 5–17)
BUN SERPL-MCNC: 23 MG/DL — SIGNIFICANT CHANGE UP (ref 7–23)
CALCIUM SERPL-MCNC: 9.6 MG/DL — SIGNIFICANT CHANGE UP (ref 8.4–10.5)
CHLORIDE SERPL-SCNC: 103 MMOL/L — SIGNIFICANT CHANGE UP (ref 96–108)
CO2 SERPL-SCNC: 24 MMOL/L — SIGNIFICANT CHANGE UP (ref 22–31)
CREAT SERPL-MCNC: 0.61 MG/DL — SIGNIFICANT CHANGE UP (ref 0.5–1.3)
GLUCOSE BLDC GLUCOMTR-MCNC: 127 MG/DL — HIGH (ref 70–99)
GLUCOSE BLDC GLUCOMTR-MCNC: 135 MG/DL — HIGH (ref 70–99)
GLUCOSE BLDC GLUCOMTR-MCNC: 143 MG/DL — HIGH (ref 70–99)
GLUCOSE BLDC GLUCOMTR-MCNC: 162 MG/DL — HIGH (ref 70–99)
GLUCOSE SERPL-MCNC: 143 MG/DL — HIGH (ref 70–99)
HCT VFR BLD CALC: 27.9 % — LOW (ref 34.5–45)
HGB BLD-MCNC: 9.3 G/DL — LOW (ref 11.5–15.5)
MCHC RBC-ENTMCNC: 29.6 PG — SIGNIFICANT CHANGE UP (ref 27–34)
MCHC RBC-ENTMCNC: 33.4 GM/DL — SIGNIFICANT CHANGE UP (ref 32–36)
MCV RBC AUTO: 88.7 FL — SIGNIFICANT CHANGE UP (ref 80–100)
PLATELET # BLD AUTO: 230 K/UL — SIGNIFICANT CHANGE UP (ref 150–400)
POTASSIUM SERPL-MCNC: 4 MMOL/L — SIGNIFICANT CHANGE UP (ref 3.5–5.3)
POTASSIUM SERPL-SCNC: 4 MMOL/L — SIGNIFICANT CHANGE UP (ref 3.5–5.3)
RBC # BLD: 3.15 M/UL — LOW (ref 3.8–5.2)
RBC # FLD: 14.8 % — HIGH (ref 10.3–14.5)
SODIUM SERPL-SCNC: 135 MMOL/L — SIGNIFICANT CHANGE UP (ref 135–145)
WBC # BLD: 18.8 K/UL — HIGH (ref 3.8–10.5)
WBC # FLD AUTO: 18.8 K/UL — HIGH (ref 3.8–10.5)

## 2018-09-23 RX ORDER — MULTIVIT WITH MIN/MFOLATE/K2 340-15/3 G
300 POWDER (GRAM) ORAL ONCE
Qty: 0 | Refills: 0 | Status: COMPLETED | OUTPATIENT
Start: 2018-09-23 | End: 2018-09-23

## 2018-09-23 RX ORDER — DEXAMETHASONE 0.5 MG/5ML
2 ELIXIR ORAL EVERY 6 HOURS
Qty: 0 | Refills: 0 | Status: DISCONTINUED | OUTPATIENT
Start: 2018-09-23 | End: 2018-09-24

## 2018-09-23 RX ADMIN — Medication 100 MILLIGRAM(S): at 05:43

## 2018-09-23 RX ADMIN — Medication 2 MILLIGRAM(S): at 23:45

## 2018-09-23 RX ADMIN — Medication 3 MILLIGRAM(S): at 05:43

## 2018-09-23 RX ADMIN — LACTULOSE 20 GRAM(S): 10 SOLUTION ORAL at 17:33

## 2018-09-23 RX ADMIN — LOSARTAN POTASSIUM 50 MILLIGRAM(S): 100 TABLET, FILM COATED ORAL at 05:43

## 2018-09-23 RX ADMIN — HYDROMORPHONE HYDROCHLORIDE 30 MILLILITER(S): 2 INJECTION INTRAMUSCULAR; INTRAVENOUS; SUBCUTANEOUS at 01:39

## 2018-09-23 RX ADMIN — GABAPENTIN 100 MILLIGRAM(S): 400 CAPSULE ORAL at 05:43

## 2018-09-23 RX ADMIN — LACTULOSE 20 GRAM(S): 10 SOLUTION ORAL at 23:45

## 2018-09-23 RX ADMIN — SENNA PLUS 2 TABLET(S): 8.6 TABLET ORAL at 21:48

## 2018-09-23 RX ADMIN — LACTULOSE 20 GRAM(S): 10 SOLUTION ORAL at 11:03

## 2018-09-23 RX ADMIN — POLYETHYLENE GLYCOL 3350 17 GRAM(S): 17 POWDER, FOR SOLUTION ORAL at 17:34

## 2018-09-23 RX ADMIN — Medication 2 MILLIGRAM(S): at 11:03

## 2018-09-23 RX ADMIN — HYDROMORPHONE HYDROCHLORIDE 30 MILLILITER(S): 2 INJECTION INTRAMUSCULAR; INTRAVENOUS; SUBCUTANEOUS at 05:44

## 2018-09-23 RX ADMIN — GABAPENTIN 100 MILLIGRAM(S): 400 CAPSULE ORAL at 13:06

## 2018-09-23 RX ADMIN — NYSTATIN CREAM 1 APPLICATION(S): 100000 CREAM TOPICAL at 17:35

## 2018-09-23 RX ADMIN — Medication 100 MILLIGRAM(S): at 13:06

## 2018-09-23 RX ADMIN — HYDROMORPHONE HYDROCHLORIDE 30 MILLILITER(S): 2 INJECTION INTRAMUSCULAR; INTRAVENOUS; SUBCUTANEOUS at 14:07

## 2018-09-23 RX ADMIN — Medication 100 MILLIGRAM(S): at 21:48

## 2018-09-23 RX ADMIN — Medication 2 MILLIGRAM(S): at 19:00

## 2018-09-23 RX ADMIN — HYDROMORPHONE HYDROCHLORIDE 30 MILLILITER(S): 2 INJECTION INTRAMUSCULAR; INTRAVENOUS; SUBCUTANEOUS at 23:49

## 2018-09-23 RX ADMIN — HYDROMORPHONE HYDROCHLORIDE 30 MILLILITER(S): 2 INJECTION INTRAMUSCULAR; INTRAVENOUS; SUBCUTANEOUS at 10:00

## 2018-09-23 RX ADMIN — NYSTATIN CREAM 1 APPLICATION(S): 100000 CREAM TOPICAL at 05:43

## 2018-09-23 RX ADMIN — Medication 300 MILLILITER(S): at 13:09

## 2018-09-23 RX ADMIN — HYDROMORPHONE HYDROCHLORIDE 30 MILLILITER(S): 2 INJECTION INTRAMUSCULAR; INTRAVENOUS; SUBCUTANEOUS at 19:02

## 2018-09-23 RX ADMIN — POLYETHYLENE GLYCOL 3350 17 GRAM(S): 17 POWDER, FOR SOLUTION ORAL at 05:43

## 2018-09-23 RX ADMIN — HYDROMORPHONE HYDROCHLORIDE 30 MILLILITER(S): 2 INJECTION INTRAMUSCULAR; INTRAVENOUS; SUBCUTANEOUS at 17:59

## 2018-09-23 RX ADMIN — ENOXAPARIN SODIUM 30 MILLIGRAM(S): 100 INJECTION SUBCUTANEOUS at 23:45

## 2018-09-23 RX ADMIN — LACTULOSE 20 GRAM(S): 10 SOLUTION ORAL at 00:29

## 2018-09-23 RX ADMIN — Medication 3 MILLIGRAM(S): at 00:29

## 2018-09-23 RX ADMIN — GABAPENTIN 100 MILLIGRAM(S): 400 CAPSULE ORAL at 21:48

## 2018-09-23 RX ADMIN — ENOXAPARIN SODIUM 30 MILLIGRAM(S): 100 INJECTION SUBCUTANEOUS at 11:02

## 2018-09-23 RX ADMIN — HYDROMORPHONE HYDROCHLORIDE 30 MILLILITER(S): 2 INJECTION INTRAMUSCULAR; INTRAVENOUS; SUBCUTANEOUS at 07:28

## 2018-09-23 RX ADMIN — LACTULOSE 20 GRAM(S): 10 SOLUTION ORAL at 05:43

## 2018-09-23 RX ADMIN — HYDROMORPHONE HYDROCHLORIDE 30 MILLILITER(S): 2 INJECTION INTRAMUSCULAR; INTRAVENOUS; SUBCUTANEOUS at 21:58

## 2018-09-23 NOTE — PROGRESS NOTE ADULT - ASSESSMENT
73F hx HTN, prediabetes, spinal stenosis 2/2 metastatic lesion now POD#5 s/p T12-L4 fusion and plastics closure (9/18)    - Aquacel exchanged, next change 9/27 or prior to d/c  - Cont drains  - Rest of care per primary    Plastic Surgery  [phone]

## 2018-09-23 NOTE — PROGRESS NOTE ADULT - SUBJECTIVE AND OBJECTIVE BOX
Pt seen and examined. Doing well. No acute events o/n. Did not get out of bed yesterday.    T(C): 36.7 (09-23-18 @ 05:41), Max: 36.8 (09-22-18 @ 21:44)  HR: 66 (09-23-18 @ 05:41) (66 - 77)  BP: 135/71 (09-23-18 @ 05:41) (118/73 - 146/73)  RR: 18 (09-23-18 @ 05:41) (16 - 18)  SpO2: 95% (09-23-18 @ 05:41) (93% - 98%)  Wt(kg): --  09-22 @ 07:01  -  09-23 @ 07:00  --------------------------------------------------------  IN:    Oral Fluid: 1080 mL    sodium chloride 0.9%: 1000 mL  Total IN: 2080 mL    OUT:    Drain: 390 mL    Drain: 65 mL    Voided: 1350 mL  Total OUT: 1805 mL    Total NET: 275 mL    Exam:  Back soft, dressing in place c/d/i  JPs serosanguinous      LABS:                         9.3    18.8  )-----------( 230      ( 23 Sep 2018 07:22 )             27.9

## 2018-09-23 NOTE — PROGRESS NOTE ADULT - SUBJECTIVE AND OBJECTIVE BOX
SUBJECTIVE:   Patient is a 73 year old female with chronic back pain which progressively worsened.  Began to develop parasthesias to the leg thigh as well as left leg weakness.  MRI was done which showed infiltrative process at L@ vertebral body.  CT scan then done which showed possible uterine cancer with mets.  Now presented to ED for increased severity of pain.    OVERNIGHT EVENTS: pain controlled this morning     Vital Signs Last 24 Hrs  T(C): 36.7 (23 Sep 2018 05:41), Max: 36.8 (22 Sep 2018 21:44)  T(F): 98.1 (23 Sep 2018 05:41), Max: 98.2 (22 Sep 2018 21:44)  HR: 66 (23 Sep 2018 05:41) (66 - 77)  BP: 135/71 (23 Sep 2018 05:41) (118/73 - 146/73)  BP(mean): --  RR: 18 (23 Sep 2018 05:41) (16 - 18)  SpO2: 95% (23 Sep 2018 05:41) (93% - 98%)    PHYSICAL EXAM:    Constitutional: No Acute Distress     Neurological: awake, alert, oriented x3, follows commands, speech clear and fluent, moves UE 5/5 strength throughout b/l  LLE: 2-3/5 HF, 4/5 KF/KE, 5/5 DF/PF  RLE: 4/5 HF/KF/KE, 5/5 DF/PF  sensation present, intact, equal throughout    Pulmonary: Clear to Auscultation, No rales, No rhonchi, No wheezes     Cardiovascular: S1, S2, Regular rate and rhythm     Gastrointestinal: Soft, Non-tender, Non-distended     Extremities: No calf tenderness     Incision: c/d/i  LABS:                        9.3    18.8  )-----------( 230      ( 23 Sep 2018 07:22 )             27.9    09-23    135  |  103  |  23  ----------------------------<  143<H>  4.0   |  24  |  0.61    Ca    9.6      23 Sep 2018 07:22    Hemoglobin A1C, Whole Blood: 6.1 % (09-13-18 @ 08:06)      09-22 @ 07:01  -  09-23 @ 07:00  --------------------------------------------------------  IN: 2080 mL / OUT: 1805 mL / NET: 275 mL      DRAINS:  hmv right (65) hmv left 390    MEDICATIONS:  Anticoagulation:   enoxaparin Injectable 30 milliGRAM(s) SubCutaneous every 12 hours    Antibiotics:    Endo:  dexamethasone  Injectable 2 milliGRAM(s) IV Push every 6 hours  dextrose 40% Gel 15 Gram(s) Oral once PRN  dextrose 50% Injectable 12.5 Gram(s) IV Push once  dextrose 50% Injectable 25 Gram(s) IV Push once  dextrose 50% Injectable 25 Gram(s) IV Push once  glucagon  Injectable 1 milliGRAM(s) IntraMuscular once PRN  insulin lispro (HumaLOG) corrective regimen sliding scale   SubCutaneous three times a day before meals  insulin lispro (HumaLOG) corrective regimen sliding scale   SubCutaneous at bedtime    Neuro:  acetaminophen   Tablet .. 650 milliGRAM(s) Oral every 6 hours PRN Temp greater or equal to 38C (100.4F), Mild Pain (1 - 3)  cyclobenzaprine 5 milliGRAM(s) Oral three times a day PRN Muscle Spasm  diphenhydrAMINE   Capsule 25 milliGRAM(s) Oral every 24 hours PRN Insomnia  gabapentin 100 milliGRAM(s) Oral three times a day  HYDROmorphone PCA (1 mG/mL) 30 milliLiter(s) PCA Continuous PCA Continuous  HYDROmorphone PCA (1 mG/mL) Rescue Clinician Bolus 1.5 milliGRAM(s) IV Push every 1 hour PRN for pain unrelieved with PCA demand dose  ondansetron Injectable 4 milliGRAM(s) IV Push every 6 hours PRN Nausea and/or Vomiting    Cardiac:  losartan 50 milliGRAM(s) Oral daily    Pulm:    GI/:  docusate sodium 100 milliGRAM(s) Oral three times a day  lactulose Syrup 20 Gram(s) Oral every 6 hours  polyethylene glycol 3350 17 Gram(s) Oral every 12 hours  senna 2 Tablet(s) Oral at bedtime    Other:   influenza   Vaccine 0.5 milliLiter(s) IntraMuscular once  naloxone Injectable 0.1 milliGRAM(s) IV Push every 3 minutes PRN For ANY of the following changes in patient status:  A. RR LESS THAN 10 breaths per minute, B. Oxygen saturation LESS THAN 90%, C. Sedation score of 6  nystatin Powder 1 Application(s) Topical two times a day    DIET: ccd    IMAGING:

## 2018-09-23 NOTE — PROGRESS NOTE ADULT - ASSESSMENT
HPI:  Patient is a 73 year old female with chronic back pain which progressively worsened.  Began to develop parasthesias to the leg thigh as well as left leg weakness.  MRI was done which showed infiltrative process at L@ vertebral body.  CT scan then done which showed possible uterine cancer with mets.  Now presented to ED for increased severity of pain.    PROCEDURE: s/p angiogram and embolization of L2 lesion on 9/17/2018, now s/p L2 corpectomy and T11-L4 posterior instrumentation and fusion on 9/18/2018  PAD#6, POD#5    PLAN:  -decrease decadron to 2 q 6   -dilaudid pca for pain control, follow up palliative care recs  -flexeril for muscle spasms  -senna, colace, and miralax for bowel regimen  -HISS for glucose control type 2 dm   -continue hemovacs, monitor outputs  -lovenox and SCDs for DVT prophylaxis  -consistent carbohydrate diet  -out of bed with assistance  -incentive spirometer for lung expansion  -pt - acute rehab upon discharge  -ot eval pending  - leukocytosis secondary to steroids no sign of infection     Spectra #[id number]

## 2018-09-24 LAB
ANION GAP SERPL CALC-SCNC: 11 MMOL/L — SIGNIFICANT CHANGE UP (ref 5–17)
BUN SERPL-MCNC: 27 MG/DL — HIGH (ref 7–23)
CALCIUM SERPL-MCNC: 10 MG/DL — SIGNIFICANT CHANGE UP (ref 8.4–10.5)
CHLORIDE SERPL-SCNC: 100 MMOL/L — SIGNIFICANT CHANGE UP (ref 96–108)
CO2 SERPL-SCNC: 25 MMOL/L — SIGNIFICANT CHANGE UP (ref 22–31)
CREAT SERPL-MCNC: 0.63 MG/DL — SIGNIFICANT CHANGE UP (ref 0.5–1.3)
GLUCOSE BLDC GLUCOMTR-MCNC: 110 MG/DL — HIGH (ref 70–99)
GLUCOSE BLDC GLUCOMTR-MCNC: 152 MG/DL — HIGH (ref 70–99)
GLUCOSE BLDC GLUCOMTR-MCNC: 176 MG/DL — HIGH (ref 70–99)
GLUCOSE BLDC GLUCOMTR-MCNC: 181 MG/DL — HIGH (ref 70–99)
GLUCOSE SERPL-MCNC: 142 MG/DL — HIGH (ref 70–99)
HCT VFR BLD CALC: 31.7 % — LOW (ref 34.5–45)
HGB BLD-MCNC: 10.1 G/DL — LOW (ref 11.5–15.5)
MCHC RBC-ENTMCNC: 28.3 PG — SIGNIFICANT CHANGE UP (ref 27–34)
MCHC RBC-ENTMCNC: 31.9 GM/DL — LOW (ref 32–36)
MCV RBC AUTO: 88.7 FL — SIGNIFICANT CHANGE UP (ref 80–100)
PLATELET # BLD AUTO: 271 K/UL — SIGNIFICANT CHANGE UP (ref 150–400)
POTASSIUM SERPL-MCNC: 4.2 MMOL/L — SIGNIFICANT CHANGE UP (ref 3.5–5.3)
POTASSIUM SERPL-SCNC: 4.2 MMOL/L — SIGNIFICANT CHANGE UP (ref 3.5–5.3)
RBC # BLD: 3.58 M/UL — LOW (ref 3.8–5.2)
RBC # FLD: 15 % — HIGH (ref 10.3–14.5)
SODIUM SERPL-SCNC: 136 MMOL/L — SIGNIFICANT CHANGE UP (ref 135–145)
WBC # BLD: 17.3 K/UL — HIGH (ref 3.8–10.5)
WBC # FLD AUTO: 17.3 K/UL — HIGH (ref 3.8–10.5)

## 2018-09-24 PROCEDURE — 99233 SBSQ HOSP IP/OBS HIGH 50: CPT

## 2018-09-24 PROCEDURE — 99232 SBSQ HOSP IP/OBS MODERATE 35: CPT

## 2018-09-24 RX ORDER — HYDROMORPHONE HYDROCHLORIDE 2 MG/ML
4 INJECTION INTRAMUSCULAR; INTRAVENOUS; SUBCUTANEOUS EVERY 4 HOURS
Qty: 0 | Refills: 0 | Status: DISCONTINUED | OUTPATIENT
Start: 2018-09-24 | End: 2018-10-01

## 2018-09-24 RX ORDER — DEXAMETHASONE 0.5 MG/5ML
2 ELIXIR ORAL EVERY 8 HOURS
Qty: 0 | Refills: 0 | Status: DISCONTINUED | OUTPATIENT
Start: 2018-09-24 | End: 2018-09-25

## 2018-09-24 RX ORDER — HYDROMORPHONE HYDROCHLORIDE 2 MG/ML
2 INJECTION INTRAMUSCULAR; INTRAVENOUS; SUBCUTANEOUS EVERY 4 HOURS
Qty: 0 | Refills: 0 | Status: DISCONTINUED | OUTPATIENT
Start: 2018-09-24 | End: 2018-10-01

## 2018-09-24 RX ORDER — HYDROMORPHONE HYDROCHLORIDE 2 MG/ML
0.5 INJECTION INTRAMUSCULAR; INTRAVENOUS; SUBCUTANEOUS
Qty: 0 | Refills: 0 | Status: DISCONTINUED | OUTPATIENT
Start: 2018-09-24 | End: 2018-10-01

## 2018-09-24 RX ADMIN — HYDROMORPHONE HYDROCHLORIDE 30 MILLILITER(S): 2 INJECTION INTRAMUSCULAR; INTRAVENOUS; SUBCUTANEOUS at 06:11

## 2018-09-24 RX ADMIN — Medication 100 MILLIGRAM(S): at 05:56

## 2018-09-24 RX ADMIN — GABAPENTIN 100 MILLIGRAM(S): 400 CAPSULE ORAL at 21:44

## 2018-09-24 RX ADMIN — Medication 1: at 12:52

## 2018-09-24 RX ADMIN — LOSARTAN POTASSIUM 50 MILLIGRAM(S): 100 TABLET, FILM COATED ORAL at 05:56

## 2018-09-24 RX ADMIN — ONDANSETRON 4 MILLIGRAM(S): 8 TABLET, FILM COATED ORAL at 11:43

## 2018-09-24 RX ADMIN — NYSTATIN CREAM 1 APPLICATION(S): 100000 CREAM TOPICAL at 05:56

## 2018-09-24 RX ADMIN — Medication 2 MILLIGRAM(S): at 05:55

## 2018-09-24 RX ADMIN — ENOXAPARIN SODIUM 30 MILLIGRAM(S): 100 INJECTION SUBCUTANEOUS at 23:12

## 2018-09-24 RX ADMIN — HYDROMORPHONE HYDROCHLORIDE 4 MILLIGRAM(S): 2 INJECTION INTRAMUSCULAR; INTRAVENOUS; SUBCUTANEOUS at 11:38

## 2018-09-24 RX ADMIN — GABAPENTIN 100 MILLIGRAM(S): 400 CAPSULE ORAL at 05:56

## 2018-09-24 RX ADMIN — Medication 1: at 08:07

## 2018-09-24 RX ADMIN — SENNA PLUS 2 TABLET(S): 8.6 TABLET ORAL at 21:44

## 2018-09-24 RX ADMIN — NYSTATIN CREAM 1 APPLICATION(S): 100000 CREAM TOPICAL at 18:09

## 2018-09-24 RX ADMIN — ENOXAPARIN SODIUM 30 MILLIGRAM(S): 100 INJECTION SUBCUTANEOUS at 12:51

## 2018-09-24 RX ADMIN — Medication 1: at 18:10

## 2018-09-24 RX ADMIN — Medication 2 MILLIGRAM(S): at 12:52

## 2018-09-24 RX ADMIN — Medication 100 MILLIGRAM(S): at 21:43

## 2018-09-24 RX ADMIN — ONDANSETRON 4 MILLIGRAM(S): 8 TABLET, FILM COATED ORAL at 05:53

## 2018-09-24 RX ADMIN — HYDROMORPHONE HYDROCHLORIDE 30 MILLILITER(S): 2 INJECTION INTRAMUSCULAR; INTRAVENOUS; SUBCUTANEOUS at 07:38

## 2018-09-24 RX ADMIN — HYDROMORPHONE HYDROCHLORIDE 4 MILLIGRAM(S): 2 INJECTION INTRAMUSCULAR; INTRAVENOUS; SUBCUTANEOUS at 11:08

## 2018-09-24 RX ADMIN — HYDROMORPHONE HYDROCHLORIDE 30 MILLILITER(S): 2 INJECTION INTRAMUSCULAR; INTRAVENOUS; SUBCUTANEOUS at 02:03

## 2018-09-24 RX ADMIN — Medication 2 MILLIGRAM(S): at 21:43

## 2018-09-24 RX ADMIN — GABAPENTIN 100 MILLIGRAM(S): 400 CAPSULE ORAL at 12:52

## 2018-09-24 NOTE — PROGRESS NOTE ADULT - SUBJECTIVE AND OBJECTIVE BOX
HPI:  Ms. Son is a 72 y/o F with HTN, prediabetes and chronic back pain 2/2 spinal stenosis p/w worsening of chronic back pain.  Pt reported pain in lower back with R sided sciatica since early 2017.  She had imaging in 2017 which revealed lumbar spinal stenosis.  She had been prescribed gabapentin as well as percocet which she only needed sparingly.  Pt recently has developed worsening lower back pain with paresthesia radiating to L thigh.  Pain says it makes it difficult to walk, and she feels that her L leg is weaker.  Pt has been unable to lay flat, or stand 2/2 the pain and has spent most of her time sitting.  She has been taking percocet without relief.  She has chronic incontinence, but no new symptoms.  She has been constipated which she attributes to taking percocet more frequently.  She has not had foot numbness or saddle anesthesia.  She had imaging done 2 weeks ago including MRI which showed an infiltrative process at L2 vertebral body, lumbar spinal stenosis and RP soft tissue mass.  CT a/p showed signs suspicious of uterine ca with mets to bone and lung.  Pt was referred to an oncologist with appointment scheduled later this week, but due to increasing severity of pain, she was advised to come to the ED by her PCP.   She underwent a CT guided biopsy of the uterine mass at [facility] originally, which showed metastatic adenocarcinoma. Patient at [facility] underwent a L2 Corpectomy with T11-L4 fusion with Dr. Caceres on 9/19. As per Gyn-Onc, she is not a surgical candidate at this time.        REVIEW OF SYSTEMS: +back pain, +R calf pain since surgeryNo chest pain, shortness of breath, nausea, vomiting or diarrhea other ROS neg     PAST MEDICAL & SURGICAL HISTORY  Lumbar spinal stenosis  Prediabetes  Essential (primary) hypertension  No pertinent past medical history  S/P right knee arthroscopy  S/P dilation and curettage  S/P cholecystectomy  No significant past surgical history        CURRENT FUNCTIONAL STATUS:  Bed Mobility:mod assist x 2      RECENT LABS/IMAGING  CBC Full  -  ( 24 Sep 2018 09:20 )  WBC Count : 17.3 K/uL  Hemoglobin : 10.1 g/dL  Hematocrit : 31.7 %  Platelet Count - Automated : 271 K/uL  Mean Cell Volume : 88.7 fl  Mean Cell Hemoglobin : 28.3 pg  Mean Cell Hemoglobin Concentration : 31.9 gm/dL  Auto Neutrophil # : x  Auto Lymphocyte # : x  Auto Monocyte # : x  Auto Eosinophil # : x  Auto Basophil # : x  Auto Neutrophil % : x  Auto Lymphocyte % : x  Auto Monocyte % : x  Auto Eosinophil % : x  Auto Basophil % : x    09-23    135  |  103  |  23  ----------------------------<  143<H>  4.0   |  24  |  0.61    Ca    9.6      23 Sep 2018 07:22          VITALS  T(C): 36.8 (09-24-18 @ 09:10), Max: 36.9 (09-23-18 @ 14:34)  HR: 68 (09-24-18 @ 09:10) (68 - 79)  BP: 123/63 (09-24-18 @ 09:10) (123/63 - 158/81)  RR: 18 (09-24-18 @ 09:10) (18 - 18)  SpO2: 96% (09-24-18 @ 09:10) (94% - 97%)    ALLERGIES  No Known Allergies      MEDICATIONS   acetaminophen   Tablet .. 650 milliGRAM(s) Oral every 6 hours PRN  cyclobenzaprine 5 milliGRAM(s) Oral three times a day PRN  dexamethasone  Injectable 2 milliGRAM(s) IV Push every 8 hours  dextrose 40% Gel 15 Gram(s) Oral once PRN  dextrose 50% Injectable 12.5 Gram(s) IV Push once  dextrose 50% Injectable 25 Gram(s) IV Push once  dextrose 50% Injectable 25 Gram(s) IV Push once  diphenhydrAMINE   Capsule 25 milliGRAM(s) Oral every 24 hours PRN  docusate sodium 100 milliGRAM(s) Oral three times a day  enoxaparin Injectable 30 milliGRAM(s) SubCutaneous every 12 hours  gabapentin 100 milliGRAM(s) Oral three times a day  glucagon  Injectable 1 milliGRAM(s) IntraMuscular once PRN  HYDROmorphone PCA (1 mG/mL) 30 milliLiter(s) PCA Continuous PCA Continuous  HYDROmorphone PCA (1 mG/mL) Rescue Clinician Bolus 1.5 milliGRAM(s) IV Push every 1 hour PRN  influenza   Vaccine 0.5 milliLiter(s) IntraMuscular once  insulin lispro (HumaLOG) corrective regimen sliding scale   SubCutaneous three times a day before meals  insulin lispro (HumaLOG) corrective regimen sliding scale   SubCutaneous at bedtime  lactulose Syrup 20 Gram(s) Oral every 6 hours  losartan 50 milliGRAM(s) Oral daily  naloxone Injectable 0.1 milliGRAM(s) IV Push every 3 minutes PRN  nystatin Powder 1 Application(s) Topical two times a day  ondansetron Injectable 4 milliGRAM(s) IV Push every 6 hours PRN  polyethylene glycol 3350 17 Gram(s) Oral every 12 hours  senna 2 Tablet(s) Oral at bedtime      ----------------------------------------------------------------------------------------  PHYSICAL EXAM  Constitutional - NAD, Comfortable, in TLSO  HEENT - NCAT, EOMI  Neck - Supple  Chest - CTA bilaterally, No wheeze  Cardiovascular -  S1S2, No murmurs  Abdomen - BS+, Soft  Extremities -  Mild R calf tenderness  Neurologic Exam -                    Cognitive - Awake, Alert, AAO to self, place, date, year, situation     Communication - Fluent, No dysarthria, no aphasia     Cranial Nerves - CN 2-12 intact     Motor - UE abduction limited due to pain, but 5/5 strength in elbow flexion/extension and  strength in b/l UE, grossly 3/5 strength in LLE, 2/5 strength in RLE proximally, 3/5 distally     Sensory - Decreased to light touch in bilateral LE     Reflexes - DTR Intact  Psychiatric - Mood stable, Affect WNL    Impression:  73 y.o. F presented with worsening back pain, found to have L2 lesion likely from uterine cancer metastasis, s/p L2 corpectomy and T11-L4 fusion, now with gait and ADL dysfunctions.     Plan:  PT- ROM, Bed Mob, Transfers, Amb w AD and bracing as needed  OT- ADLs, bracing if needed  SLP- Dysphagia eval and treat  Prec- Falls, Spinal  DVT Prophylaxis- Lovenox  Skin- Turn q2 h, daily skin checks  Dispo- HPI:  Ms. Son is a 74 y/o F with HTN, prediabetes and chronic back pain 2/2 spinal stenosis p/w worsening of chronic back pain.  Pt reported pain in lower back with R sided sciatica since early 2017.  She had imaging in 2017 which revealed lumbar spinal stenosis.  She had been prescribed gabapentin as well as percocet which she only needed sparingly.  Pt recently has developed worsening lower back pain with paresthesia radiating to L thigh.  Pain says it makes it difficult to walk, and she feels that her L leg is weaker.  Pt has been unable to lay flat, or stand 2/2 the pain and has spent most of her time sitting.  She has been taking percocet without relief.  She has chronic incontinence, but no new symptoms.  She has been constipated which she attributes to taking percocet more frequently.  She has not had foot numbness or saddle anesthesia.  She had imaging done 2 weeks ago including MRI which showed an infiltrative process at L2 vertebral body, lumbar spinal stenosis and RP soft tissue mass.  CT a/p showed signs suspicious of uterine ca with mets to bone and lung.  Pt was referred to an oncologist with appointment scheduled later this week, but due to increasing severity of pain, she was advised to come to the ED by her PCP.   She underwent a CT guided biopsy of the uterine mass at [facility] originally, which showed metastatic adenocarcinoma. Patient at [facility] underwent a L2 Corpectomy with T11-L4 fusion with Dr. Caceres on 9/19. As per Gyn-Onc, she is not a surgical candidate at this time.        REVIEW OF SYSTEMS: +back pain but improving, +R calf pain since surgeryNo chest pain, shortness of breath, nausea, vomiting or diarrhea other ROS neg     PAST MEDICAL & SURGICAL HISTORY  Lumbar spinal stenosis  Prediabetes  Essential (primary) hypertension  No pertinent past medical history  S/P right knee arthroscopy  S/P dilation and curettage  S/P cholecystectomy  No significant past surgical history        CURRENT FUNCTIONAL STATUS:  Bed Mobility:mod assist x 2      RECENT LABS/IMAGING  CBC Full  -  ( 24 Sep 2018 09:20 )  WBC Count : 17.3 K/uL  Hemoglobin : 10.1 g/dL  Hematocrit : 31.7 %  Platelet Count - Automated : 271 K/uL  Mean Cell Volume : 88.7 fl  Mean Cell Hemoglobin : 28.3 pg  Mean Cell Hemoglobin Concentration : 31.9 gm/dL  Auto Neutrophil # : x  Auto Lymphocyte # : x  Auto Monocyte # : x  Auto Eosinophil # : x  Auto Basophil # : x  Auto Neutrophil % : x  Auto Lymphocyte % : x  Auto Monocyte % : x  Auto Eosinophil % : x  Auto Basophil % : x    09-23    135  |  103  |  23  ----------------------------<  143<H>  4.0   |  24  |  0.61    Ca    9.6      23 Sep 2018 07:22          VITALS  T(C): 36.8 (09-24-18 @ 09:10), Max: 36.9 (09-23-18 @ 14:34)  HR: 68 (09-24-18 @ 09:10) (68 - 79)  BP: 123/63 (09-24-18 @ 09:10) (123/63 - 158/81)  RR: 18 (09-24-18 @ 09:10) (18 - 18)  SpO2: 96% (09-24-18 @ 09:10) (94% - 97%)    ALLERGIES  No Known Allergies      MEDICATIONS   acetaminophen   Tablet .. 650 milliGRAM(s) Oral every 6 hours PRN  cyclobenzaprine 5 milliGRAM(s) Oral three times a day PRN  dexamethasone  Injectable 2 milliGRAM(s) IV Push every 8 hours  dextrose 40% Gel 15 Gram(s) Oral once PRN  dextrose 50% Injectable 12.5 Gram(s) IV Push once  dextrose 50% Injectable 25 Gram(s) IV Push once  dextrose 50% Injectable 25 Gram(s) IV Push once  diphenhydrAMINE   Capsule 25 milliGRAM(s) Oral every 24 hours PRN  docusate sodium 100 milliGRAM(s) Oral three times a day  enoxaparin Injectable 30 milliGRAM(s) SubCutaneous every 12 hours  gabapentin 100 milliGRAM(s) Oral three times a day  glucagon  Injectable 1 milliGRAM(s) IntraMuscular once PRN  HYDROmorphone PCA (1 mG/mL) 30 milliLiter(s) PCA Continuous PCA Continuous  HYDROmorphone PCA (1 mG/mL) Rescue Clinician Bolus 1.5 milliGRAM(s) IV Push every 1 hour PRN  influenza   Vaccine 0.5 milliLiter(s) IntraMuscular once  insulin lispro (HumaLOG) corrective regimen sliding scale   SubCutaneous three times a day before meals  insulin lispro (HumaLOG) corrective regimen sliding scale   SubCutaneous at bedtime  lactulose Syrup 20 Gram(s) Oral every 6 hours  losartan 50 milliGRAM(s) Oral daily  naloxone Injectable 0.1 milliGRAM(s) IV Push every 3 minutes PRN  nystatin Powder 1 Application(s) Topical two times a day  ondansetron Injectable 4 milliGRAM(s) IV Push every 6 hours PRN  polyethylene glycol 3350 17 Gram(s) Oral every 12 hours  senna 2 Tablet(s) Oral at bedtime      ----------------------------------------------------------------------------------------  PHYSICAL EXAM  Constitutional - NAD, Comfortable, in TLSO  HEENT - NCAT, EOMI  Neck - Supple  Chest - CTA bilaterally, No wheeze  Cardiovascular -  S1S2, No murmurs  Abdomen - BS+, Soft  Extremities -  Mild R calf tenderness  Neurologic Exam -                    Cognitive - Awake, Alert, AAO to self, place, date, year, situation     Communication - Fluent, No dysarthria, no aphasia     Cranial Nerves - CN 2-12 intact     Motor - UE abduction limited due to pain, but 5/5 strength in elbow flexion/extension and  strength in b/l UE, grossly 2/5 strength in proximal LLE and 3/5 distally, 2/5 strength in RLE proximally, 3/5 distally     Sensory - Decreased to light touch in bilateral LE     Reflexes - DTR Intact  Psychiatric - Mood stable, Affect WNL    Impression:  73 y.o. F presented with worsening back pain, found to have L2 lesion likely from uterine cancer metastasis, s/p L2 corpectomy and T11-L4 fusion, now with gait and ADL dysfunctions.     Plan:  PT- ROM, Bed Mob, Transfers, Amb w AD and bracing as needed  OT- ADLs, bracing if needed  SLP- Dysphagia eval and treat  Prec- Falls, Spinal  DVT Prophylaxis- Lovenox  Skin- Turn q2 h, daily skin checks  Dispo- HPI:  Ms. Son is a 72 y/o F with HTN, prediabetes and chronic back pain 2/2 spinal stenosis p/w worsening of chronic back pain.  Pt reported pain in lower back with R sided sciatica since early 2017.  She had imaging in 2017 which revealed lumbar spinal stenosis.  She had been prescribed gabapentin as well as percocet which she only needed sparingly.  Pt recently has developed worsening lower back pain with paresthesia radiating to L thigh.  Pain says it makes it difficult to walk, and she feels that her L leg is weaker.  Pt has been unable to lay flat, or stand 2/2 the pain and has spent most of her time sitting.  She has been taking percocet without relief.  She has chronic incontinence, but no new symptoms.  She has been constipated which she attributes to taking percocet more frequently.  She has not had foot numbness or saddle anesthesia.  She had imaging done 2 weeks ago including MRI which showed an infiltrative process at L2 vertebral body, lumbar spinal stenosis and RP soft tissue mass.  CT a/p showed signs suspicious of uterine ca with mets to bone and lung.  Pt was referred to an oncologist with appointment scheduled later this week, but due to increasing severity of pain, she was advised to come to the ED by her PCP.   She underwent a CT guided biopsy of the uterine mass at [facility] originally, which showed metastatic adenocarcinoma. Patient at [facility] underwent a L2 Corpectomy with T11-L4 fusion with Dr. Caceres on 9/19. As per Gyn-Onc, she is not a surgical candidate at this time.    REVIEW OF SYSTEMS: +back pain but improving, +R calf pain since surgeryNo chest pain, shortness of breath, nausea, vomiting or diarrhea other ROS neg     PAST MEDICAL & SURGICAL HISTORY  Lumbar spinal stenosis  Prediabetes  Essential (primary) hypertension  No pertinent past medical history  S/P right knee arthroscopy  S/P dilation and curettage  S/P cholecystectomy  No significant past surgical history    CURRENT FUNCTIONAL STATUS:  Bed Mobility:mod assist x 2      RECENT LABS/IMAGING  CBC Full  -  ( 24 Sep 2018 09:20 )  WBC Count : 17.3 K/uL  Hemoglobin : 10.1 g/dL  Hematocrit : 31.7 %  Platelet Count - Automated : 271 K/uL  Mean Cell Volume : 88.7 fl  Mean Cell Hemoglobin : 28.3 pg  Mean Cell Hemoglobin Concentration : 31.9 gm/dL  Auto Neutrophil # : x  Auto Lymphocyte # : x  Auto Monocyte # : x  Auto Eosinophil # : x  Auto Basophil # : x  Auto Neutrophil % : x  Auto Lymphocyte % : x  Auto Monocyte % : x  Auto Eosinophil % : x  Auto Basophil % : x    09-23    135  |  103  |  23  ----------------------------<  143<H>  4.0   |  24  |  0.61    Ca    9.6      23 Sep 2018 07:22          VITALS  T(C): 36.8 (09-24-18 @ 09:10), Max: 36.9 (09-23-18 @ 14:34)  HR: 68 (09-24-18 @ 09:10) (68 - 79)  BP: 123/63 (09-24-18 @ 09:10) (123/63 - 158/81)  RR: 18 (09-24-18 @ 09:10) (18 - 18)  SpO2: 96% (09-24-18 @ 09:10) (94% - 97%)    ALLERGIES  No Known Allergies      MEDICATIONS   acetaminophen   Tablet .. 650 milliGRAM(s) Oral every 6 hours PRN  cyclobenzaprine 5 milliGRAM(s) Oral three times a day PRN  dexamethasone  Injectable 2 milliGRAM(s) IV Push every 8 hours  dextrose 40% Gel 15 Gram(s) Oral once PRN  dextrose 50% Injectable 12.5 Gram(s) IV Push once  dextrose 50% Injectable 25 Gram(s) IV Push once  dextrose 50% Injectable 25 Gram(s) IV Push once  diphenhydrAMINE   Capsule 25 milliGRAM(s) Oral every 24 hours PRN  docusate sodium 100 milliGRAM(s) Oral three times a day  enoxaparin Injectable 30 milliGRAM(s) SubCutaneous every 12 hours  gabapentin 100 milliGRAM(s) Oral three times a day  glucagon  Injectable 1 milliGRAM(s) IntraMuscular once PRN  HYDROmorphone PCA (1 mG/mL) 30 milliLiter(s) PCA Continuous PCA Continuous  HYDROmorphone PCA (1 mG/mL) Rescue Clinician Bolus 1.5 milliGRAM(s) IV Push every 1 hour PRN  influenza   Vaccine 0.5 milliLiter(s) IntraMuscular once  insulin lispro (HumaLOG) corrective regimen sliding scale   SubCutaneous three times a day before meals  insulin lispro (HumaLOG) corrective regimen sliding scale   SubCutaneous at bedtime  lactulose Syrup 20 Gram(s) Oral every 6 hours  losartan 50 milliGRAM(s) Oral daily  naloxone Injectable 0.1 milliGRAM(s) IV Push every 3 minutes PRN  nystatin Powder 1 Application(s) Topical two times a day  ondansetron Injectable 4 milliGRAM(s) IV Push every 6 hours PRN  polyethylene glycol 3350 17 Gram(s) Oral every 12 hours  senna 2 Tablet(s) Oral at bedtime      ----------------------------------------------------------------------------------------  PHYSICAL EXAM  Constitutional - NAD, Comfortable, in TLSO  HEENT - NCAT, EOMI  Neck - Supple  Chest - CTA bilaterally, No wheeze  Cardiovascular -  S1S2, No murmurs  Abdomen - BS+, Soft  Extremities -  Mild R calf tenderness  Neurologic Exam -                    Cognitive - Awake, Alert, AAO to self, place, date, year, situation     Communication - Fluent, No dysarthria, no aphasia     Cranial Nerves - CN 2-12 intact     Motor - UE abduction limited due to pain, but 5/5 strength in elbow flexion/extension and  strength in b/l UE, grossly 2/5 strength in proximal LLE and 3/5 distally, 2/5 strength in RLE proximally, 3/5 distally     Sensory - Decreased to light touch in bilateral LE     Reflexes - DTR Intact  Psychiatric - Mood stable, Affect WNL    Impression:  73 y.o. F presented with worsening back pain, found to have L2 lesion likely from uterine cancer metastasis, s/p L2 corpectomy and T11-L4 fusion, now with gait and ADL dysfunctions.     Plan:  PT- ROM, Bed Mob, Transfers, Amb w AD and bracing as needed  OT- ADLs, bracing if needed  SLP- Dysphagia eval and treat  Prec- Falls, Spinal  DVT Prophylaxis- Lovenox  Skin- Turn q2 h, daily skin checks  Dispo- TREVON

## 2018-09-24 NOTE — OCCUPATIONAL THERAPY INITIAL EVALUATION ADULT - ADDITIONAL COMMENTS
CT Lumbar Spine: Redemonstration of large lytic destructive process involving the L2 vertebral body. Similar appearing 6.4 x 4.2 cm destructive soft tissue mass approximating the left aspect of the L2 vertebral body. Interval L2 corpectomy, L1 and L2 total laminectomies, and posterior fusion of T11-L4 as described. Hardware is well-placed. No evidence of hardware loosening. Evaluation of the spinal canal contents is markedly limited by CT modality and streak artifact from spinal hardware.  Pt s/p L2 CORPECTOMY, T11-L4 FUSION

## 2018-09-24 NOTE — PROGRESS NOTE ADULT - SUBJECTIVE AND OBJECTIVE BOX
SUBJECTIVE: Appears much improved today, not in much pain.     OVERNIGHT EVENTS: None    Vital Signs Last 24 Hrs  T(C): 36.8 (24 Sep 2018 09:10), Max: 36.9 (23 Sep 2018 14:34)  T(F): 98.2 (24 Sep 2018 09:10), Max: 98.4 (23 Sep 2018 14:34)  HR: 68 (24 Sep 2018 09:10) (68 - 79)  BP: 123/63 (24 Sep 2018 09:10) (123/63 - 158/81)  BP(mean): --  RR: 18 (24 Sep 2018 09:10) (18 - 18)  SpO2: 96% (24 Sep 2018 09:10) (94% - 97%)  IVF: [X ] IVL [ ] NS+K@   DIET: [ ] Regular [ X] CCD [ ] Renal [ ] Puree [ ] Dysphagia [ ] Tube Feeds:   PCA: [X ] YES [ ] NO   WILSON: [ ] YES [ ] NO [X ] VOID, Bladder [id number] this AM.   BM: [X ] YES on 9/20    DRAINS: [ ] NIMCO (cc/24h) [X ] HMV x2=27/345 (cc/24h)    PHYSICAL EXAM:    Constitutional: No Acute Distress     Neurological: AOx3, Speech fluent. Following Commands,   UE 5/5 strength throughout,  b/l LLE: 2-3/5 HF, 4/5 KF/KE, 5/5 DF/PF, RLE: 4/5 HF/KF/KE, 5/5 DF/PF    Sensation: [X] intact to light touch  [] decreased:     Pulmonary: Clear to Auscultation, No rales, No rhonchi, No wheezes     Cardiovascular: S1, S2, Regular rate and rhythm     Gastrointestinal: Soft, Non-tender, Non-distended     Extremities: No calf tenderness     Incision: HMV x2 active. CDI. FLat    LABS:                        10.1   17.3  )-----------( 271      ( 24 Sep 2018 09:20 )             31.7    09-23    135  |  103  |  23  ----------------------------<  143<H>  4.0   |  24  |  0.61    Ca    9.6      23 Sep 2018 07:22      IMAGING: < from: CT Lumbar Spine No Cont (09.19.18 @ 10:17) >  Redemonstration of large lytic destructive process involving the L2   vertebral body. Similar appearing 6.4 x 4.2 cm destructive soft tissue   mass approximating the left aspect of the L2 vertebral body.    Interval L2 corpectomy, L1 and L2 total laminectomies, and posterior   fusion of T11-L4 as described. Hardware is well-placed. No evidence of   hardware loosening.    Evaluation of the spinal canal contents is markedly limited by CT   modality and streak artifact from spinal hardware.    MEDICATIONS  (STANDING):  dexamethasone  Injectable 2 milliGRAM(s) IV Push every 6 hours  dextrose 50% Injectable 12.5 Gram(s) IV Push once  dextrose 50% Injectable 25 Gram(s) IV Push once  dextrose 50% Injectable 25 Gram(s) IV Push once  docusate sodium 100 milliGRAM(s) Oral three times a day  enoxaparin Injectable 30 milliGRAM(s) SubCutaneous every 12 hours  gabapentin 100 milliGRAM(s) Oral three times a day  HYDROmorphone PCA (1 mG/mL) 30 milliLiter(s) PCA Continuous PCA Continuous  influenza   Vaccine 0.5 milliLiter(s) IntraMuscular once  insulin lispro (HumaLOG) corrective regimen sliding scale   SubCutaneous three times a day before meals  insulin lispro (HumaLOG) corrective regimen sliding scale   SubCutaneous at bedtime  lactulose Syrup 20 Gram(s) Oral every 6 hours  losartan 50 milliGRAM(s) Oral daily  nystatin Powder 1 Application(s) Topical two times a day  polyethylene glycol 3350 17 Gram(s) Oral every 12 hours  senna 2 Tablet(s) Oral at bedtime    MEDICATIONS  (PRN):  acetaminophen   Tablet .. 650 milliGRAM(s) Oral every 6 hours PRN Temp greater or equal to 38C (100.4F), Mild Pain (1 - 3)  cyclobenzaprine 5 milliGRAM(s) Oral three times a day PRN Muscle Spasm  dextrose 40% Gel 15 Gram(s) Oral once PRN Blood Glucose LESS THAN 70 milliGRAM(s)/deciliter  diphenhydrAMINE   Capsule 25 milliGRAM(s) Oral every 24 hours PRN Insomnia  glucagon  Injectable 1 milliGRAM(s) IntraMuscular once PRN Glucose LESS THAN 70 milligrams/deciliter  HYDROmorphone PCA (1 mG/mL) Rescue Clinician Bolus 1.5 milliGRAM(s) IV Push every 1 hour PRN for pain unrelieved with PCA demand dose  naloxone Injectable 0.1 milliGRAM(s) IV Push every 3 minutes PRN For ANY of the following changes in patient status:  A. RR LESS THAN 10 breaths per minute, B. Oxygen saturation LESS THAN 90%, C. Sedation score of 6  ondansetron Injectable 4 milliGRAM(s) IV Push every 6 hours PRN Nausea and/or Vomiting

## 2018-09-24 NOTE — PROVIDER CONTACT NOTE (OTHER) - ASSESSMENT
pt voided 50cc this evening and prior to that pt voided 200cc at 1430 - pt had the urge but unable to pass urine. bladder scanned 600cc noted

## 2018-09-24 NOTE — PROGRESS NOTE ADULT - SUBJECTIVE AND OBJECTIVE BOX
Pt seen and examined.  Has not yet gotten OOB.  Pain appears to be better controlled; patient able to move herself better in bed    T(C): 36.6 (09-24-18 @ 05:53), Max: 37 (09-23-18 @ 09:25)  HR: 79 (09-24-18 @ 05:53) (71 - 79)  BP: 158/81 (09-24-18 @ 05:53) (128/74 - 158/81)  BP(mean): --  ABP: --  ABP(mean): --  RR: 18 (09-24-18 @ 05:53) (18 - 18)  SpO2: 95% (09-24-18 @ 05:53) (94% - 97%)  Wt(kg): --  CVP(mm Hg): --  CI: --  CAPILLARY BLOOD GLUCOSE      POCT Blood Glucose.: 162 mg/dL (23 Sep 2018 21:18)  POCT Blood Glucose.: 127 mg/dL (23 Sep 2018 17:38)  POCT Blood Glucose.: 135 mg/dL (23 Sep 2018 12:06)  POCT Blood Glucose.: 143 mg/dL (23 Sep 2018 08:48)   N/A      09-23 @ 07:01  -  09-24 @ 07:00  --------------------------------------------------------  IN:    Oral Fluid: 1400 mL  Total IN: 1400 mL    OUT:    Drain: 345 mL    Drain: 27 mL    Intermittent Catheterization - Urethral: 700 mL    Voided: 350 mL  Total OUT: 1422 mL    Total NET: -22 mL          Exam:  Back soft, dressing in place c/d/i.  No collections  JPs serosanguinous

## 2018-09-24 NOTE — OCCUPATIONAL THERAPY INITIAL EVALUATION ADULT - PRECAUTIONS/LIMITATIONS, REHAB EVAL
thigh.  Pain makes it difficult to walk, and she feels that her L leg is weaker.  Pt has been unable to lay flat, or stand 2/2 the pain and has spent most of her time sitting.  She has been taking percocet without relief.  She has chronic incontinence, but no new symptoms.  She has been constipated which she attributes to taking percocet more frequently.  She has not had foot numbness or saddle anesthesia.  She had imaging done 2 weeks ago including MRI which showed an infiltrative process at L2 vertebral body, lumbar spinal stenosis and RP soft tissue mass.  CT a/p showed signs suspicious of uterine ca with mets to bone and lung.  Pt was referred to an oncologist with appointment scheduled later this week, but due to increasing severity of pain, she was advised to come to the ED by her PCP./fall precautions spinal precautions/thigh.  Pain makes it difficult to walk, and she feels that her L leg is weaker.  Pt has been unable to lay flat, or stand 2/2 the pain and has spent most of her time sitting.  She has been taking percocet without relief.  She has chronic incontinence, but no new symptoms.  She has been constipated which she attributes to taking percocet more frequently.  She has not had foot numbness or saddle anesthesia.  She had imaging done 2 weeks ago including MRI which showed an infiltrative process at L2 vertebral body, lumbar spinal stenosis and RP soft tissue mass.  CT a/p showed signs suspicious of uterine ca with mets to bone and lung.  Pt was referred to an oncologist with appointment scheduled later this week, but due to increasing severity of pain, she was advised to come to the ED by her PCP./fall precautions

## 2018-09-24 NOTE — OCCUPATIONAL THERAPY INITIAL EVALUATION ADULT - LIVES WITH, PROFILE
Pt lives alone in apartment with elevator access, tub in bathroom. Pt I in ADLs and ambulation prior to admission/alone

## 2018-09-24 NOTE — PROGRESS NOTE ADULT - PROBLEM SELECTOR PLAN 3
Primary team switched off PCA to dilaudid 2mg PO/4mg PO/0.5mg IV for moderate/severe/breakthrough pain. Patient reportedly agreeable. Will sign off.

## 2018-09-24 NOTE — PROGRESS NOTE ADULT - ASSESSMENT
Ms. Son is a 74 y/o F with HTN, prediabetes and chronic back pain 2/2 spinal stenosis p/w worsening of chronic back pain.  Pt reports pain in lower back with R sided sciatica since early 2017.  She had imaging in 2017 which revealed lumbar spinal stenosis.  She had been prescribed gabapentin as well as percocet which she only needed sparingly.  Pt recently has developed worsening lower back pain with paresthesia radiating to L thigh.  Pain makes it difficult to walk, and she feels that her L leg is weaker.  Pt has been unable to lay flat, or stand 2/2 the pain and has spent most of her time sitting.  She has been taking percocet without relief.  She has chronic incontinence, but no new symptoms.  She has been constipated which she attributes to taking percocet more frequently.  She has not had foot numbness or saddle anesthesia.  She had imaging done 2 weeks ago including MRI which showed an infiltrative process at L2 vertebral body, lumbar spinal stenosis and RP soft tissue mass.  CT a/p showed signs suspicious of uterine ca with mets to bone and lung.  Pt was referred to an oncologist with appointment scheduled later this week, but due to increasing severity of pain, she was advised to come to the ED by her PCP.       At [facility] patient receieved CT guided biopsy. Tx to [facility] for further work up and management of spinal lesion (12 Sep 2018 19:54)    PROCEDURE:  Adm 9/13. 9/17 Angio/Embo L2 Lesion. 9/18 L2 Corpectomy excision of intraspinal lesion, T11-L4 Fusion w/plastic closure, CSF leak repaired. Muscle flap closure     POD#6    PLAN:  Neuro: Monitor for urinary retention-Bladder scan this CK=[id number]. Cont HMVx2 per plastic mgt.   Leukocytosis trending down-poss from steroids. DC PCA now to Dilaudid PO/IV. Decrease Decadron to 2mg Q8hr now. Inc activity/OOB. Patient to f/u with oncology for out patient chemotherapy and radiation.  GYN consult appreciated, not a surgical candidate at this time    Plastic-- Aquacel exchanged, next change 9/27 or prior to d/c.   Cont drains.  Rest of care per primary.  Plastic Surgery [phone]    Palliative Care note seen.  I will DC PCA at this time as d/w patient and she is in agreement.    Hemo/Onco-will require outpatient radiation, radiation oncology chart note is noted, plan for radiation as outpatient.  2. Metastatic endometrial cancer patient will need to establish care with medical oncology after discharge, referral to [facility] will be made, for consideration of antineoplastic chemotherapy, this will be offered after radiation the spine is complete.  discussed with patient, her sister and friend today the results of biopsy and treatment plan. Discussed that as tumor has metastasized, Stage IV, it is incurable and treatment will be palliative in nature. Discussed that chemotherapy will likely be offered, but only after completing radiation and as an outpatient. Patient and her sister expressed understanding and all of their questions were answered to the best of my ability to their apparent satisfaction.  Please reconsult oncology fellow if any questions or concerns. Referral is made to [facility] and [facility] number ([phone] New Patient Scheduling) was given to patient to make appointment. Waqas Dan PGY-6, Hematology-Oncology Fellow [phone] ([place]) [phone] ([facility])    GYN-Expansile metastatic infiltration of vertebral body of L2 which is destroyed with associated large left paraspinal and epidural soft tissue component extending into the left neural foramen and spinal canal compressing the descending roots resulting in moderate central canal, severe left lateral recess and foraminal stenosis. Soft tissue mass extends into the left hemipelvis / iliopsoas region. Partially imaged heterogeneous mass in the pelvis likely of uterine origin.  Fullness in the renal collecting system, left greater than right,  hydronephrosis versus parapelvic cysts, as this region is partially imaged correlation with dedicated abdominal/pelvic imaging is suggested      Medicine/Hosp-Problem: Essential (primary) hypertension.  Plan: BP generally at goal (except during episodes of pain). Continue Losartan.  Problem: Malignant neoplasm of uterus, unspecified site.  Plan: Outpatient CT suggest uterine mass w/ mets to spine and lung. 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 GYN consult appreciated, not a surgical candidate at this time. Onc input, outpatient radiation +/- chemo. Problem: Cellulitis, unspecified cellulitis site.  Plan: Cellulitis of LLE diagnosed at [facility], resolved.  Completed course of Ancef . Blood cultures NGTD. LE dopplers negative for DVT.  Problem: Prediabetes.  Plan: Hgb A1c 6.1 Continue insulin sliding scale Monitor fingersticks closely while on steroids.     Respiratory: Patient instructed to use incentive spirometer [ X] YES [ ] NO              DVT ppx: [X ] SQL [ ] SQH and Venodynes [ ] Left [ ] Right [ X] Bilateral    Discharge Planning:  The patient was evaluated by PT and recommended Acute Rehab. PMR to decide on final dispo FU.       Assessment:  Please Check When Present   []  GCS  E   V  M     Heart Failure: []Acute, [] acute on chronic , []chronic  Heart Failure:  [] Diastolic (HFpEF), [] Systolic (HFrEF), []Combined (HFpEF and HFrEF), [] RHF, [] Pulm HTN, [] Other    [] CANDIE, [] ATN, [] AIN, [] other  [] CKD1, [] CKD2, [] CKD 3, [] CKD 4, [] CKD 5, []ESRD    Encephalopathy: [] Metabolic, [] Hepatic, [] toxic, [] Neurological, [] Other    Abnormal Nurtitional Status: [] malnurtition (see nutrition note), [ ]underweight: BMI < 19, [] morbid obesity: BMI >40, [] Cachexia    [] Sepsis  [] hypovolemic shock,[] cardiogenic shock, [] hemorrhagic shock, [] neuogenic shock  [] Acute Respiratory Failure  []Cerebral edema, [] Brain compression/ herniation,   [] Functional quadriplegia  [] Acute blood loss anemia

## 2018-09-24 NOTE — OCCUPATIONAL THERAPY INITIAL EVALUATION ADULT - PERTINENT HX OF CURRENT PROBLEM, REHAB EVAL
74 yo F p/w worsening of chronic back pain.  Pt reports pain in lower back with R sided sciatica since early 2017.  She had imaging in 2017 which revealed lumbar spinal stenosis.  She had been prescribed gabapentin as well as percocet which she only needed sparingly.  Pt recently has developed worsening LBP with paresthesia radiating to L thigh.  See below

## 2018-09-24 NOTE — PROGRESS NOTE ADULT - ATTENDING COMMENTS
73 y.o female with newly diagnosed metastatic EMCA.  Patient to f/u with oncology for out patient chemotherapy and radiation.
Seen and examined with resident. Agree with note.   Patient will need subacute rehabilitation when stable.
I was physically present for the key portions of the evaluation and management (E/M) service provided.  I agree with the above history, physical, and plan which I have reviewed and edited where appropriate. Plan discussed with team.
I was physically present for the key portions of the evaluation and management (E/M) service provided.  I agree with the above history, physical, and plan which I have reviewed and edited where appropriate. Plan discussed with team.

## 2018-09-24 NOTE — PROGRESS NOTE ADULT - ASSESSMENT
73F hx HTN, prediabetes, spinal stenosis 2/2 metastatic lesion now POD#5 s/p T12-L4 fusion and plastics closure (9/18)    - Aquacel exchanged, next change 9/27 or prior to d/c  - Cont drains  - Rest of care per primary    Plastic Surgery  [phone]

## 2018-09-24 NOTE — PROGRESS NOTE ADULT - SUBJECTIVE AND OBJECTIVE BOX
INTERVAL HPI/OVERNIGHT EVENTS: feeling nauseated today; PCA discontinued by primary team after discussion with patient. States feeling nauseated today.    DNR on chart:   Allergies    No Known Allergies    Intolerances    MEDICATIONS  (STANDING):  dexamethasone  Injectable 2 milliGRAM(s) IV Push every 8 hours  dextrose 50% Injectable 12.5 Gram(s) IV Push once  dextrose 50% Injectable 25 Gram(s) IV Push once  dextrose 50% Injectable 25 Gram(s) IV Push once  docusate sodium 100 milliGRAM(s) Oral three times a day  enoxaparin Injectable 30 milliGRAM(s) SubCutaneous every 12 hours  gabapentin 100 milliGRAM(s) Oral three times a day  influenza   Vaccine 0.5 milliLiter(s) IntraMuscular once  insulin lispro (HumaLOG) corrective regimen sliding scale   SubCutaneous three times a day before meals  insulin lispro (HumaLOG) corrective regimen sliding scale   SubCutaneous at bedtime  lactulose Syrup 20 Gram(s) Oral every 6 hours  losartan 50 milliGRAM(s) Oral daily  nystatin Powder 1 Application(s) Topical two times a day  polyethylene glycol 3350 17 Gram(s) Oral every 12 hours  senna 2 Tablet(s) Oral at bedtime    MEDICATIONS  (PRN):  acetaminophen   Tablet .. 650 milliGRAM(s) Oral every 6 hours PRN Temp greater or equal to 38C (100.4F), Mild Pain (1 - 3)  cyclobenzaprine 5 milliGRAM(s) Oral three times a day PRN Muscle Spasm  dextrose 40% Gel 15 Gram(s) Oral once PRN Blood Glucose LESS THAN 70 milliGRAM(s)/deciliter  diphenhydrAMINE   Capsule 25 milliGRAM(s) Oral every 24 hours PRN Insomnia  glucagon  Injectable 1 milliGRAM(s) IntraMuscular once PRN Glucose LESS THAN 70 milligrams/deciliter  HYDROmorphone   Tablet 2 milliGRAM(s) Oral every 4 hours PRN Moderate Pain (4 - 6)  HYDROmorphone   Tablet 4 milliGRAM(s) Oral every 4 hours PRN Severe Pain (7 - 10)  HYDROmorphone  Injectable 0.5 milliGRAM(s) IV Push every 3 hours PRN Breakthrough pain  naloxone Injectable 0.1 milliGRAM(s) IV Push every 3 minutes PRN For ANY of the following changes in patient status:  A. RR LESS THAN 10 breaths per minute, B. Oxygen saturation LESS THAN 90%, C. Sedation score of 6  ondansetron Injectable 4 milliGRAM(s) IV Push every 6 hours PRN Nausea and/or Vomiting        ITEMS UNCHECKED ARE NOT PRESENT    PRESENT SYMPTOMS: [ ]Unable to obtain due to poor mentation   Source if other than patient:  [ ]Family   [ ]Team     Pain (Impact on QOL):  Limits movement  Location: generalized back  Minimal acceptable level (0-10 scale): 5            Aggravating factors: movement  Quality: dull, achy  Radiation: none  Severity (0-10 scale):  7  Timing: intermittent    Dyspnea:                           [ ]Mild [ ]Moderate [ ]Severe  Anxiety:                             [ ]Mild [ ]Moderate [ ]Severe  Fatigue:                             [ ]Mild [ ]Moderate [x ]Severe  Nausea:                             [x ]Mild [ ]Moderate [ ]Severe  Loss of appetite:              [ ]Mild [ ]Moderate [ ]Severe  Constipation:                    [x ]Mild [ ]Moderate [ ]Severe    PAIN AD Score:	  http://geriatrictoolkit.[email or web address]/painad.pdf (Ctrl + left click to view)    Other Symptoms:  [ ]All other review of systems negative     Karnofsky Performance Score/Palliative Performance Status Version 2:   40-50      %    http://palliative.info/resource_material/[email or web address].pdf  PHYSICAL EXAM:  Vital Signs Last 24 Hrs  T(C): 36.7 (23 Sep 2018 05:41), Max: 36.8 (22 Sep 2018 21:44)  T(F): 98.1 (23 Sep 2018 05:41), Max: 98.2 (22 Sep 2018 21:44)  HR: 66 (23 Sep 2018 05:41) (66 - 77)  BP: 135/71 (23 Sep 2018 05:41) (118/73 - 146/73)  BP(mean): --  RR: 18 (23 Sep 2018 05:41) (16 - 18)  SpO2: 95% (23 Sep 2018 05:41) (93% - 98%) I&O's Summary    21 Sep 2018 07:01  -  22 Sep 2018 07:00  --------------------------------------------------------  IN: 1900 mL / OUT: 1505 mL / NET: 395 mL    22 Sep 2018 07:01  -  23 Sep 2018 06:58  --------------------------------------------------------  IN: 2080 mL / OUT: 1805 mL / NET: 275 mL    Limited exam as patient resting   GENERAL:  [x ]Alert  [x ]Oriented x3   [ ]Lethargic  [ ]Cachexia  [ ]Unarousable  [ ]Verbal  [ ]Non-Verbal    Behavioral:   [ ] Anxiety  [ ] Delirium [ ] Agitation [ ] Other    HEENT:  [x ]Normal   [ ]Dry mouth   [ ]ET Tube/Trach  [ ]Oral lesions    PULMONARY:   [ ]Clear [ ]Tachypnea  [ ]Audible excessive secretions   [ ]Rhonchi        [ ]Right [ ]Left [ ]Bilateral  [ ]Crackles        [ ]Right [ ]Left [ ]Bilateral  [ ]Wheezing     [ ]Right [ ]Left [ ]Bilateral    CARDIOVASCULAR:    [ ]Regular [ ]Irregular [ ]Tachy  [ ]Spencer [ ]Murmur [ ]Other    GASTROINTESTINAL:  [ ]Soft  [ ]Distended   [ ]+BS  [ ]Non tender [ ]Tender  [ ]PEG [ ]OGT/ NGT   Last BM: 9/20/18   GENITOURINARY:  [ ]Normal [ ] Incontinent   [ ]Oliguria/Anuria   [ ]Martinez    MUSCULOSKELETAL:   [ ]Normal   [ x]Weakness  [ ]Bed/Wheelchair bound [ ]Edema    NEUROLOGIC:   [x ]No focal deficits  [ ] Cognitive impairment  [ ] Dysphagia [ ]Dysarthria [ ] Paresis [ ]Other     SKIN:   [ x]Normal   [ ]Pressure ulcer(s)  [ ]Rash    CRITICAL CARE:  [ ] Shock Present  [ ]Septic [ ]Cardiogenic [ ]Neurologic [ ]Hypovolemic  [ ]  Vasopressors [ ]  Inotropes   [ ] Respiratory failure present  [ ] Acute  [ ] Chronic [ ] Hypoxic  [ ] Hypercarbic [ ] Other  [ ] Other organ failure     LABS:                        10.1   17.3  )-----------( 271      ( 24 Sep 2018 09:20 )             31.7     09-24    136  |  100  |  27<H>  ----------------------------<  142<H>  4.2   |  25  |  0.63    Ca    10.0      24 Sep 2018 09:20    RADIOLOGY & ADDITIONAL STUDIES:    Protein Calorie Malnutrition Present: [ ] yes [ ] no  [ ] PPSV2 < or = 30%  [ ] significant weight loss [ ] poor nutritional intake [ ] anasarca [ ] catabolic state Albumin, Serum: 3.5 g/dL (09-12-18 @ 23:02)  Artificial Nutrition [ ]     REFERRALS:   [ ]Chaplaincy  [ ] Hospice  [ ]Child Life  [ ]Social Work  [ ]Case management [ ]Holistic Therapy   Goals of Care Document:

## 2018-09-24 NOTE — PROGRESS NOTE ADULT - ASSESSMENT
73F with PMH of HTN, spinal stenosis, chronic back pain here with worsening pain. Pain started in early 2017 with R-sided sciatica, resulting in diagnosis of lumbar spinal stenosis, and has used gabapentin and percocet at home. Pain subsequently worsened, causing difficulty walking and difficulty being supine, and now has parasthesias radiating to her L thigh. Also with constipation. CT A/P showed concern for new metastatic uterine CA to bone and lung, and came to [facility] from [facility] for management of her spinal lesion, now s/p T11-L4 fusion, and L2 corpectomy.

## 2018-09-24 NOTE — PROGRESS NOTE ADULT - PROBLEM SELECTOR PLAN 2
- biopsy results from 9/12 shows metastatic adenocarcinoma, compatible metastasis from adenocarcinoma of mullerian origin  - s/p T11-L4 fusion, and L2 corpectomy  - per GynOnc, patient is not a surgical candidate at this time   - patient will require outpatient rad-onc and heme-onc follow up

## 2018-09-25 DIAGNOSIS — N39.0 URINARY TRACT INFECTION, SITE NOT SPECIFIED: ICD-10-CM

## 2018-09-25 DIAGNOSIS — R60.9 EDEMA, UNSPECIFIED: ICD-10-CM

## 2018-09-25 LAB
APPEARANCE UR: ABNORMAL
BACTERIA # UR AUTO: ABNORMAL
BILIRUB UR-MCNC: NEGATIVE — SIGNIFICANT CHANGE UP
COLOR SPEC: YELLOW — SIGNIFICANT CHANGE UP
DIFF PNL FLD: ABNORMAL
EPI CELLS # UR: 4 /HPF — SIGNIFICANT CHANGE UP (ref 0–5)
GLUCOSE BLDC GLUCOMTR-MCNC: 115 MG/DL — HIGH (ref 70–99)
GLUCOSE BLDC GLUCOMTR-MCNC: 131 MG/DL — HIGH (ref 70–99)
GLUCOSE BLDC GLUCOMTR-MCNC: 169 MG/DL — HIGH (ref 70–99)
GLUCOSE BLDC GLUCOMTR-MCNC: 99 MG/DL — SIGNIFICANT CHANGE UP (ref 70–99)
GLUCOSE UR QL: NEGATIVE MG/DL — SIGNIFICANT CHANGE UP
HYALINE CASTS # UR AUTO: 5 /LPF — SIGNIFICANT CHANGE UP (ref 0–7)
KETONES UR-MCNC: NEGATIVE — SIGNIFICANT CHANGE UP
LEUKOCYTE ESTERASE UR-ACNC: ABNORMAL
NITRITE UR-MCNC: POSITIVE
PH UR: 5.5 — SIGNIFICANT CHANGE UP (ref 5–8)
PROT UR-MCNC: 100 MG/DL
RBC CASTS # UR COMP ASSIST: 239 /HPF — HIGH (ref 0–4)
SP GR SPEC: 1.02 — SIGNIFICANT CHANGE UP (ref 1.01–1.02)
UROBILINOGEN FLD QL: NEGATIVE MG/DL — SIGNIFICANT CHANGE UP
WBC UR QL: >720 /HPF — HIGH (ref 0–5)

## 2018-09-25 PROCEDURE — 99233 SBSQ HOSP IP/OBS HIGH 50: CPT

## 2018-09-25 RX ORDER — CEFTRIAXONE 500 MG/1
1 INJECTION, POWDER, FOR SOLUTION INTRAMUSCULAR; INTRAVENOUS ONCE
Qty: 0 | Refills: 0 | Status: COMPLETED | OUTPATIENT
Start: 2018-09-25 | End: 2018-09-25

## 2018-09-25 RX ORDER — CEFTRIAXONE 500 MG/1
1 INJECTION, POWDER, FOR SOLUTION INTRAMUSCULAR; INTRAVENOUS EVERY 24 HOURS
Qty: 0 | Refills: 0 | Status: DISCONTINUED | OUTPATIENT
Start: 2018-09-26 | End: 2018-09-27

## 2018-09-25 RX ORDER — DEXAMETHASONE 0.5 MG/5ML
2 ELIXIR ORAL EVERY 12 HOURS
Qty: 0 | Refills: 0 | Status: DISCONTINUED | OUTPATIENT
Start: 2018-09-25 | End: 2018-09-27

## 2018-09-25 RX ORDER — CEFTRIAXONE 500 MG/1
INJECTION, POWDER, FOR SOLUTION INTRAMUSCULAR; INTRAVENOUS
Qty: 0 | Refills: 0 | Status: DISCONTINUED | OUTPATIENT
Start: 2018-09-25 | End: 2018-09-27

## 2018-09-25 RX ADMIN — CEFTRIAXONE 100 GRAM(S): 500 INJECTION, POWDER, FOR SOLUTION INTRAMUSCULAR; INTRAVENOUS at 05:17

## 2018-09-25 RX ADMIN — Medication 650 MILLIGRAM(S): at 12:45

## 2018-09-25 RX ADMIN — POLYETHYLENE GLYCOL 3350 17 GRAM(S): 17 POWDER, FOR SOLUTION ORAL at 05:23

## 2018-09-25 RX ADMIN — LOSARTAN POTASSIUM 50 MILLIGRAM(S): 100 TABLET, FILM COATED ORAL at 05:23

## 2018-09-25 RX ADMIN — GABAPENTIN 100 MILLIGRAM(S): 400 CAPSULE ORAL at 05:23

## 2018-09-25 RX ADMIN — Medication 1: at 12:14

## 2018-09-25 RX ADMIN — Medication 100 MILLIGRAM(S): at 05:22

## 2018-09-25 RX ADMIN — GABAPENTIN 100 MILLIGRAM(S): 400 CAPSULE ORAL at 17:51

## 2018-09-25 RX ADMIN — Medication 2 MILLIGRAM(S): at 17:53

## 2018-09-25 RX ADMIN — Medication 2 MILLIGRAM(S): at 05:22

## 2018-09-25 RX ADMIN — NYSTATIN CREAM 1 APPLICATION(S): 100000 CREAM TOPICAL at 17:51

## 2018-09-25 RX ADMIN — Medication 650 MILLIGRAM(S): at 11:58

## 2018-09-25 RX ADMIN — GABAPENTIN 100 MILLIGRAM(S): 400 CAPSULE ORAL at 22:01

## 2018-09-25 RX ADMIN — NYSTATIN CREAM 1 APPLICATION(S): 100000 CREAM TOPICAL at 05:28

## 2018-09-25 RX ADMIN — ENOXAPARIN SODIUM 30 MILLIGRAM(S): 100 INJECTION SUBCUTANEOUS at 11:58

## 2018-09-25 RX ADMIN — ENOXAPARIN SODIUM 30 MILLIGRAM(S): 100 INJECTION SUBCUTANEOUS at 22:00

## 2018-09-25 NOTE — PROGRESS NOTE ADULT - PROBLEM SELECTOR PLAN 3
-likely neoplasm related pain, metastatic bone lesion of L2 causing severe lower back pain   - s/p L2 corpectomy T11-L4 fusion  palliative care following for pain control   - Decadron taper a per neurosurgery   - Flexeril prn muscle spasms   - PT

## 2018-09-25 NOTE — PROGRESS NOTE ADULT - ASSESSMENT
Ms. Son is a 74 y/o F with HTN, prediabetes and chronic back pain 2/2 spinal stenosis p/w worsening of chronic back pain.  Pt reports pain in lower back with R sided sciatica since early 2017.  She had imaging in 2017 which revealed lumbar spinal stenosis.  She had been prescribed gabapentin as well as percocet which she only needed sparingly.  Pt recently has developed worsening lower back pain with paresthesia radiating to L thigh.  Pain makes it difficult to walk, and she feels that her L leg is weaker.  Pt has been unable to lay flat, or stand 2/2 the pain and has spent most of her time sitting.  She has been taking percocet without relief.  She has chronic incontinence, but no new symptoms.  She has been constipated which she attributes to taking percocet more frequently.  She has not had foot numbness or saddle anesthesia.  She had imaging done 2 weeks ago including MRI which showed an infiltrative process at L2 vertebral body, lumbar spinal stenosis and RP soft tissue mass.  CT a/p showed signs suspicious of uterine ca with mets to bone and lung.  Pt was referred to an oncologist with appointment scheduled later this week, but due to increasing severity of pain, she was advised to come to the ED by her PCP.       At [facility] patient receieved CT guided biopsy. Tx to [facility] for further work up and management of spinal lesion (12 Sep 2018 19:54)    PROCEDURE:  Adm 9/13. 9/17 Angio/Embo L2 Lesion. 9/18 L2 Corpectomy excision of intraspinal lesion, T11-L4 Fusion w/plastic closure, CSF leak repaired. Muscle flap closure     POD#7    PLAN:  Neuro: Cont HMVx2 per plastic mgt.   Leukocytosis trending down-poss from steroids. 9/24 +UA on Ceftriaxone, FU Ua CX. PCA DC'd 9/24. Decrease Decadron to 2mg Q12hr now. Inc activity/OOB. Patient to f/u with oncology for out patient chemotherapy and radiation.  GYN consult appreciated, not a surgical candidate at this time.  MRI Pelvis w/anesth-P to characterized mass. Med made aware this AM to FU for UTI and Abx mgt.    Plastic-- Aquacel exchanged, next change 9/27 or prior to d/c.  Remove deep (left) drain today, continue with R drain.  f/u urine cultures for UTI.  Continue with ceftriaxone   Rest of care per primary team.  Plastic Surgery [phone]    Palliative Care-Chronic low back pain, unspecified back pain laterality, with sciatica presence unspecified.  Plan: Primary team switched off PCA 9/24 to dilaudid 2mg PO/4mg PO/0.5mg IV for moderate/severe/breakthrough pain. Patient reportedly agreeable. Will sign off.       Hemo/Onco-will require outpatient radiation, radiation oncology chart note is noted, plan for radiation as outpatient.  2. Metastatic endometrial cancer patient will need to establish care with medical oncology after discharge, referral to [facility] will be made, for consideration of antineoplastic chemotherapy, this will be offered after radiation the spine is complete.  discussed with patient, her sister and friend today the results of biopsy and treatment plan. Discussed that as tumor has metastasized, Stage IV, it is incurable and treatment will be palliative in nature. Discussed that chemotherapy will likely be offered, but only after completing radiation and as an outpatient. Patient and her sister expressed understanding and all of their questions were answered to the best of my ability to their apparent satisfaction.  Please reconsult oncology fellow if any questions or concerns. Referral is made to [facility]'s number ([phone] New Patient Scheduling) was given to patient to make appointment. Waqas Dan PGY-6, Hematology-Oncology Fellow [phone] ([place]) [phone] ([facility])    GYN-Expansile metastatic infiltration of vertebral body of L2 which is destroyed with associated large left paraspinal and epidural soft tissue component extending into the left neural foramen and spinal canal compressing the descending roots resulting in moderate central canal, severe left lateral recess and foraminal stenosis. Soft tissue mass extends into the left hemipelvis / iliopsoas region. Partially imaged heterogeneous mass in the pelvis likely of uterine origin.  Fullness in the renal collecting system, left greater than right,  hydronephrosis versus parapelvic cysts, as this region is partially imaged correlation with dedicated abdominal/pelvic imaging is suggested      Medicine/Hosp-Problem: Essential (primary) hypertension.  Plan: BP generally at goal (except during episodes of pain). Continue Losartan.  Problem: Malignant neoplasm of uterus, unspecified site.  Plan: Outpatient CT suggest uterine mass w/ mets to spine and lung. 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 GYN consult appreciated, not a surgical candidate at this time. Onc input, outpatient radiation +/- chemo. Problem: Cellulitis, unspecified cellulitis site.  Plan: Cellulitis of LLE diagnosed at [facility], resolved.  Completed course of Ancef . Blood cultures NGTD. LE dopplers negative for DVT.  Problem: Prediabetes.  Plan: Hgb A1c 6.1 Continue insulin sliding scale Monitor fingersticks closely while on steroids.     Respiratory: Patient instructed to use incentive spirometer [ X] YES [ ] NO              DVT ppx: [X ] SQL [ ] SQH and Venodynes [ ] Left [ ] Right [ X] Bilateral    Discharge Planning:  The patient was evaluated by PT/OT/PMR and recommended Sub Acute Rehab.     Assessment:  Please Check When Present   []  GCS  E   V  M     Heart Failure: []Acute, [] acute on chronic , []chronic  Heart Failure:  [] Diastolic (HFpEF), [] Systolic (HFrEF), []Combined (HFpEF and HFrEF), [] RHF, [] Pulm HTN, [] Other    [] CANDIE, [] ATN, [] AIN, [] other  [] CKD1, [] CKD2, [] CKD 3, [] CKD 4, [] CKD 5, []ESRD    Encephalopathy: [] Metabolic, [] Hepatic, [] toxic, [] Neurological, [] Other    Abnormal Nurtitional Status: [] malnurtition (see nutrition note), [ ]underweight: BMI < 19, [] morbid obesity: BMI >40, [] Cachexia    [] Sepsis  [] hypovolemic shock,[] cardiogenic shock, [] hemorrhagic shock, [] neuogenic shock  [] Acute Respiratory Failure  []Cerebral edema, [] Brain compression/ herniation,   [] Functional quadriplegia  [] Acute blood loss anemia

## 2018-09-25 NOTE — PROGRESS NOTE ADULT - SUBJECTIVE AND OBJECTIVE BOX
Plastic Surgery Progress Note    Subjective: Patient examined at bedside. Patient seen by OT and Rehab medicine yesterday, having gait and activities of daily living dysfunctions.       Objective:    Vital Signs Last 24 Hrs  T(C): 36.8 (25 Sep 2018 05:20), Max: 36.8 (25 Sep 2018 05:20)  T(F): 98.2 (25 Sep 2018 05:20), Max: 98.2 (25 Sep 2018 05:20)  HR: 79 (25 Sep 2018 05:20) (72 - 89)  BP: 149/96 (25 Sep 2018 05:20) (140/70 - 160/94)  BP(mean): --  RR: 18 (25 Sep 2018 05:20) (18 - 18)  SpO2: 96% (25 Sep 2018 05:20) (96% - 100%)    I&O's Summary    24 Sep 2018 07:01  -  25 Sep 2018 07:00  --------------------------------------------------------  IN: 243 mL / OUT: 885 mL / NET: -642 mL        Exam:  General: NAD  Respiratory: Breathing comfortably  Cardiovascular: NSR  Back: soft, dressing in place c/d/i.  No collections, 2 JPs in place with serosanguinous output

## 2018-09-25 NOTE — PROGRESS NOTE ADULT - SUBJECTIVE AND OBJECTIVE BOX
Carlos Machcua   Pager [phone]  Office [phone]      CC: Patient is a 73y old  Female who presents with a chief complaint of L2 lesion (25 Sep 2018 10:13)      SUBJECTIVE / OVERNIGHT EVENTS: Pain controlled. Had freq small amts of urine since Martinez removed but no dysuria. Urinating normally after large BM today. No f/c/r. No CP/SOB. Old LLE edema; new RLE edema since surgery    MEDICATIONS  (STANDING):  cefTRIAXone   IVPB      dexamethasone     Tablet 2 milliGRAM(s) Oral every 12 hours  dextrose 50% Injectable 12.5 Gram(s) IV Push once  dextrose 50% Injectable 25 Gram(s) IV Push once  dextrose 50% Injectable 25 Gram(s) IV Push once  docusate sodium 100 milliGRAM(s) Oral three times a day  enoxaparin Injectable 30 milliGRAM(s) SubCutaneous every 12 hours  gabapentin 100 milliGRAM(s) Oral three times a day  influenza   Vaccine 0.5 milliLiter(s) IntraMuscular once  insulin lispro (HumaLOG) corrective regimen sliding scale   SubCutaneous three times a day before meals  insulin lispro (HumaLOG) corrective regimen sliding scale   SubCutaneous at bedtime  lactulose Syrup 20 Gram(s) Oral every 6 hours  losartan 50 milliGRAM(s) Oral daily  nystatin Powder 1 Application(s) Topical two times a day  polyethylene glycol 3350 17 Gram(s) Oral every 12 hours  senna 2 Tablet(s) Oral at bedtime    MEDICATIONS  (PRN):  acetaminophen   Tablet .. 650 milliGRAM(s) Oral every 6 hours PRN Temp greater or equal to 38C (100.4F), Mild Pain (1 - 3)  cyclobenzaprine 5 milliGRAM(s) Oral three times a day PRN Muscle Spasm  dextrose 40% Gel 15 Gram(s) Oral once PRN Blood Glucose LESS THAN 70 milliGRAM(s)/deciliter  diphenhydrAMINE   Capsule 25 milliGRAM(s) Oral every 24 hours PRN Insomnia  glucagon  Injectable 1 milliGRAM(s) IntraMuscular once PRN Glucose LESS THAN 70 milligrams/deciliter  HYDROmorphone   Tablet 2 milliGRAM(s) Oral every 4 hours PRN Moderate Pain (4 - 6)  HYDROmorphone   Tablet 4 milliGRAM(s) Oral every 4 hours PRN Severe Pain (7 - 10)  HYDROmorphone  Injectable 0.5 milliGRAM(s) IV Push every 3 hours PRN Breakthrough pain  naloxone Injectable 0.1 milliGRAM(s) IV Push every 3 minutes PRN For ANY of the following changes in patient status:  A. RR LESS THAN 10 breaths per minute, B. Oxygen saturation LESS THAN 90%, C. Sedation score of 6  ondansetron Injectable 4 milliGRAM(s) IV Push every 6 hours PRN Nausea and/or Vomiting      Vital Signs Last 24 Hrs  T(C): 36.8 (25 Sep 2018 05:20), Max: 36.8 (25 Sep 2018 05:20)  T(F): 98.2 (25 Sep 2018 05:20), Max: 98.2 (25 Sep 2018 05:20)  HR: 79 (25 Sep 2018 05:20) (72 - 89)  BP: 149/96 (25 Sep 2018 05:20) (140/70 - 160/94)  BP(mean): --  RR: 18 (25 Sep 2018 05:20) (18 - 18)  SpO2: 96% (25 Sep 2018 05:20) (96% - 100%)  CAPILLARY BLOOD GLUCOSE      POCT Blood Glucose.: 131 mg/dL (25 Sep 2018 09:16)  POCT Blood Glucose.: 110 mg/dL (24 Sep 2018 22:03)  POCT Blood Glucose.: 152 mg/dL (24 Sep 2018 17:27)  POCT Blood Glucose.: 181 mg/dL (24 Sep 2018 12:35)    I&O's Summary    24 Sep 2018 07:01  -  25 Sep 2018 07:00  --------------------------------------------------------  IN: 243 mL / OUT: 885 mL / NET: -642 mL          PHYSICAL EXAM:    GENERAL: NAD   HEENT: EOMI, PERRL  PULM: Clear to auscultation bilaterally  CV: Regular rate and rhythm; nl S1, S2; No murmurs, rubs, or gallops  ABDOMEN: Soft, Nontender, Nondistended; Bowel sounds present; no bladder TTP  EXTREMITIES/MSK: 1+ RLE; 1-2+ LLE  PSYCH: AAOx3  NEUROLOGY: 3- prox b/l LE; 5/5 distal        LABS:                        10.1   17.3  )-----------( 271      ( 24 Sep 2018 09:20 )             31.7     -    136  |  100  |  27<H>  ----------------------------<  142<H>  4.2   |  25  |  0.63    Ca    10.0      24 Sep 2018 09:20            Urinalysis Basic - ( 24 Sep 2018 22:15 )    Color: Yellow / Appearance: Slightly Turbid / S.019 / pH: x  Gluc: x / Ketone: Negative  / Bili: Negative / Urobili: Negative mg/dL   Blood: x / Protein: 100 mg/dL / Nitrite: Positive   Leuk Esterase: Large / RBC: 239 /HPF / WBC >720 /HPF   Sq Epi: x / Non Sq Epi: 4 /HPF / Bacteria: TNTC          RADIOLOGY & ADDITIONAL TESTS:    Imaging Personally Reviewed:    Consultant(s) Notes Reviewed:      Care Discussed with Consultants/Other Providers: neurosurg PA regarding UTI/ LE edema

## 2018-09-25 NOTE — PROGRESS NOTE ADULT - PROBLEM SELECTOR PLAN 1
can continue Ceftriaxone for now as unclear if symptoms were from post-Martinez or UTI. If urine cx neg, d/c abx.

## 2018-09-25 NOTE — PROGRESS NOTE ADULT - ASSESSMENT
73F hx HTN, prediabetes, spinal stenosis 2/2 metastatic lesion now POD#7 s/p T12-L4 fusion and plastics closure (9/18)    - Aquacel exchanged, next change 9/27 or prior to d/c  - Remove deep (left) drain today, continue with R drain  - f/u urine cultures for UTI   - Continue with ceftriaxone   - Rest of care per primary team    Plastic Surgery  [phone]

## 2018-09-25 NOTE — PROGRESS NOTE ADULT - PROBLEM SELECTOR PLAN 6
Cellulitis of LLE diagnosed at [facility], resolved   Completed course of Ancef   Blood cultures NGTD  LE dopplers negative for DVT.

## 2018-09-25 NOTE — PROGRESS NOTE ADULT - SUBJECTIVE AND OBJECTIVE BOX
SUBJECTIVE: Comfortable, some back pain, but tolerable.    OVERNIGHT EVENTS: None    Vital Signs Last 24 Hrs  T(C): 36.8 (24 Sep 2018 09:10), Max: 36.9 (23 Sep 2018 14:34)  T(F): 98.2 (24 Sep 2018 09:10), Max: 98.4 (23 Sep 2018 14:34)  HR: 68 (24 Sep 2018 09:10) (68 - 79)  BP: 123/63 (24 Sep 2018 09:10) (123/63 - 158/81)  BP(mean): --  RR: 18 (24 Sep 2018 09:10) (18 - 18)  SpO2: 96% (24 Sep 2018 09:10) (94% - 97%)  IVF: [X ] IVL [ ] NS+K@   DIET: [ ] Regular [ X] CCD [ ] Renal [ ] Puree [ ] Dysphagia [ ] Tube Feeds:   PCA: [ ] YES [X ] NO-DC'd 9/24  WILSON: [ ] YES [ ] NO [X ] VOID/Incontinence   BM: [X ] YES on 9/20    DRAINS: [ ] NIMCO (cc/24h) [X ] HMV x2=25/140 (cc/24h)    PHYSICAL EXAM:    Constitutional: No Acute Distress     Neurological: AOx3, Speech fluent. Following Commands,   UE 5/5 strength throughout,  b/l LLE: 3/5 HF, 4/5 KF/KE, 5/5 DF/PF, RLE: 4/5 HF/KF/KE, 5/5 DF/PF    Sensation: [X] intact to light touch  [] decreased:     Pulmonary: Clear to Auscultation, No rales, No rhonchi, No wheezes     Cardiovascular: S1, S2, Regular rate and rhythm     Gastrointestinal: Soft, Non-tender, Non-distended     Extremities: No calf tenderness     Incision: HMV x2 active. CDI. FLat    LABS:                        10.1   17.3  )-----------( 271      ( 24 Sep 2018 09:20 )             31.7    09-23    135  |  103  |  23  ----------------------------<  143<H>  4.0   |  24  |  0.61    Ca    9.6      23 Sep 2018 07:22    Urinalysis (09.24.18 @ 22:15)    Glucose Qualitative, Urine: Negative mg/dL    Blood, Urine: Large    pH Urine: 5.5    Color: Yellow    Urine Appearance: Slightly Turbid    Bilirubin: Negative    Ketone - Urine: Negative    Specific Gravity: 1.019    Protein, Urine: 100 mg/dL    Urobilinogen: Negative mg/dL    Nitrite: Positive    Leukocyte Esterase Concentration: Large    IMAGING: < from: CT Lumbar Spine No Cont (09.19.18 @ 10:17) >  Redemonstration of large lytic destructive process involving the L2   vertebral body. Similar appearing 6.4 x 4.2 cm destructive soft tissue   mass approximating the left aspect of the L2 vertebral body.    Interval L2 corpectomy, L1 and L2 total laminectomies, and posterior   fusion of T11-L4 as described. Hardware is well-placed. No evidence of   hardware loosening.    Evaluation of the spinal canal contents is markedly limited by CT   modality and streak artifact from spinal hardware.    MEDICATIONS  (STANDING):  cefTRIAXone   IVPB      dexamethasone     Tablet 2 milliGRAM(s) Oral every 12 hours  dextrose 50% Injectable 12.5 Gram(s) IV Push once  dextrose 50% Injectable 25 Gram(s) IV Push once  dextrose 50% Injectable 25 Gram(s) IV Push once  docusate sodium 100 milliGRAM(s) Oral three times a day  enoxaparin Injectable 30 milliGRAM(s) SubCutaneous every 12 hours  gabapentin 100 milliGRAM(s) Oral three times a day  influenza   Vaccine 0.5 milliLiter(s) IntraMuscular once  insulin lispro (HumaLOG) corrective regimen sliding scale   SubCutaneous three times a day before meals  insulin lispro (HumaLOG) corrective regimen sliding scale   SubCutaneous at bedtime  lactulose Syrup 20 Gram(s) Oral every 6 hours  losartan 50 milliGRAM(s) Oral daily  nystatin Powder 1 Application(s) Topical two times a day  polyethylene glycol 3350 17 Gram(s) Oral every 12 hours  senna 2 Tablet(s) Oral at bedtime    MEDICATIONS  (PRN):  acetaminophen   Tablet .. 650 milliGRAM(s) Oral every 6 hours PRN Temp greater or equal to 38C (100.4F), Mild Pain (1 - 3)  cyclobenzaprine 5 milliGRAM(s) Oral three times a day PRN Muscle Spasm  dextrose 40% Gel 15 Gram(s) Oral once PRN Blood Glucose LESS THAN 70 milliGRAM(s)/deciliter  diphenhydrAMINE   Capsule 25 milliGRAM(s) Oral every 24 hours PRN Insomnia  glucagon  Injectable 1 milliGRAM(s) IntraMuscular once PRN Glucose LESS THAN 70 milligrams/deciliter  HYDROmorphone   Tablet 2 milliGRAM(s) Oral every 4 hours PRN Moderate Pain (4 - 6)  HYDROmorphone   Tablet 4 milliGRAM(s) Oral every 4 hours PRN Severe Pain (7 - 10)  HYDROmorphone  Injectable 0.5 milliGRAM(s) IV Push every 3 hours PRN Breakthrough pain  naloxone Injectable 0.1 milliGRAM(s) IV Push every 3 minutes PRN For ANY of the following changes in patient status:  A. RR LESS THAN 10 breaths per minute, B. Oxygen saturation LESS THAN 90%, C. Sedation score of 6  ondansetron Injectable 4 milliGRAM(s) IV Push every 6 hours PRN Nausea and/or Vomiting

## 2018-09-25 NOTE — PROGRESS NOTE ADULT - PROBLEM SELECTOR PLAN 5
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

## 2018-09-26 DIAGNOSIS — R19.7 DIARRHEA, UNSPECIFIED: ICD-10-CM

## 2018-09-26 LAB
GLUCOSE BLDC GLUCOMTR-MCNC: 122 MG/DL — HIGH (ref 70–99)
GLUCOSE BLDC GLUCOMTR-MCNC: 127 MG/DL — HIGH (ref 70–99)
GLUCOSE BLDC GLUCOMTR-MCNC: 128 MG/DL — HIGH (ref 70–99)
GLUCOSE BLDC GLUCOMTR-MCNC: 128 MG/DL — HIGH (ref 70–99)
HCT VFR BLD CALC: 31 % — LOW (ref 34.5–45)
HGB BLD-MCNC: 10.1 G/DL — LOW (ref 11.5–15.5)
MCHC RBC-ENTMCNC: 28.7 PG — SIGNIFICANT CHANGE UP (ref 27–34)
MCHC RBC-ENTMCNC: 32.6 GM/DL — SIGNIFICANT CHANGE UP (ref 32–36)
MCV RBC AUTO: 88 FL — SIGNIFICANT CHANGE UP (ref 80–100)
PLATELET # BLD AUTO: 303 K/UL — SIGNIFICANT CHANGE UP (ref 150–400)
RBC # BLD: 3.53 M/UL — LOW (ref 3.8–5.2)
RBC # FLD: 14.8 % — HIGH (ref 10.3–14.5)
WBC # BLD: 17.7 K/UL — HIGH (ref 3.8–10.5)
WBC # FLD AUTO: 17.7 K/UL — HIGH (ref 3.8–10.5)

## 2018-09-26 PROCEDURE — 99233 SBSQ HOSP IP/OBS HIGH 50: CPT

## 2018-09-26 RX ORDER — SENNA PLUS 8.6 MG/1
2 TABLET ORAL AT BEDTIME
Qty: 0 | Refills: 0 | Status: DISCONTINUED | OUTPATIENT
Start: 2018-09-26 | End: 2018-09-26

## 2018-09-26 RX ORDER — DOCUSATE SODIUM 100 MG
100 CAPSULE ORAL THREE TIMES A DAY
Qty: 0 | Refills: 0 | Status: DISCONTINUED | OUTPATIENT
Start: 2018-09-26 | End: 2018-09-26

## 2018-09-26 RX ORDER — DEXTROSE MONOHYDRATE, SODIUM CHLORIDE, AND POTASSIUM CHLORIDE 50; .745; 4.5 G/1000ML; G/1000ML; G/1000ML
1000 INJECTION, SOLUTION INTRAVENOUS
Qty: 0 | Refills: 0 | Status: DISCONTINUED | OUTPATIENT
Start: 2018-09-26 | End: 2018-09-30

## 2018-09-26 RX ADMIN — NYSTATIN CREAM 1 APPLICATION(S): 100000 CREAM TOPICAL at 18:09

## 2018-09-26 RX ADMIN — GABAPENTIN 100 MILLIGRAM(S): 400 CAPSULE ORAL at 13:32

## 2018-09-26 RX ADMIN — HYDROMORPHONE HYDROCHLORIDE 4 MILLIGRAM(S): 2 INJECTION INTRAMUSCULAR; INTRAVENOUS; SUBCUTANEOUS at 05:48

## 2018-09-26 RX ADMIN — HYDROMORPHONE HYDROCHLORIDE 4 MILLIGRAM(S): 2 INJECTION INTRAMUSCULAR; INTRAVENOUS; SUBCUTANEOUS at 06:20

## 2018-09-26 RX ADMIN — CEFTRIAXONE 100 GRAM(S): 500 INJECTION, POWDER, FOR SOLUTION INTRAMUSCULAR; INTRAVENOUS at 05:41

## 2018-09-26 RX ADMIN — NYSTATIN CREAM 1 APPLICATION(S): 100000 CREAM TOPICAL at 05:49

## 2018-09-26 RX ADMIN — Medication 2 MILLIGRAM(S): at 18:09

## 2018-09-26 RX ADMIN — GABAPENTIN 100 MILLIGRAM(S): 400 CAPSULE ORAL at 05:48

## 2018-09-26 RX ADMIN — LOSARTAN POTASSIUM 50 MILLIGRAM(S): 100 TABLET, FILM COATED ORAL at 05:48

## 2018-09-26 RX ADMIN — ENOXAPARIN SODIUM 30 MILLIGRAM(S): 100 INJECTION SUBCUTANEOUS at 11:02

## 2018-09-26 RX ADMIN — GABAPENTIN 100 MILLIGRAM(S): 400 CAPSULE ORAL at 22:20

## 2018-09-26 RX ADMIN — ONDANSETRON 4 MILLIGRAM(S): 8 TABLET, FILM COATED ORAL at 22:23

## 2018-09-26 RX ADMIN — Medication 2 MILLIGRAM(S): at 05:48

## 2018-09-26 RX ADMIN — ENOXAPARIN SODIUM 30 MILLIGRAM(S): 100 INJECTION SUBCUTANEOUS at 22:19

## 2018-09-26 NOTE — PROGRESS NOTE ADULT - SUBJECTIVE AND OBJECTIVE BOX
Plastic Surgery Progress Note    Subjective: Patient examined at bedside. Patient doing well, no complaints at this time. Left drain was discontinued yesterday.      Objective:    Vital Signs Last 24 Hrs  T(C): 36.6 (26 Sep 2018 05:19), Max: 36.9 (25 Sep 2018 20:55)  T(F): 97.8 (26 Sep 2018 05:19), Max: 98.4 (25 Sep 2018 20:55)  HR: 79 (26 Sep 2018 05:19) (74 - 91)  BP: 159/73 (26 Sep 2018 05:19) (113/66 - 159/73)  BP(mean): --  RR: 18 (26 Sep 2018 05:19) (18 - 18)  SpO2: 97% (26 Sep 2018 05:19) (96% - 98%)    I&O's Summary    25 Sep 2018 07:01  -  26 Sep 2018 07:00  --------------------------------------------------------  IN: 1440 mL / OUT: 1100 mL / NET: 340 mL  	      Exam:  General: NAD  Respiratory: Breathing comfortably  Cardiovascular: NSR  Back: soft, dressing in place c/d/i.  No collections, 1 NIMCO in place with serosanguinous output

## 2018-09-26 NOTE — PROGRESS NOTE ADULT - PROBLEM SELECTOR PLAN 7
Hgb A1c 6.1   Continue insulin sliding scale   Monitor fingersticks closely while on steroids. Cellulitis of LLE diagnosed at [facility], resolved   Completed course of Ancef   Blood cultures NGTD  LE dopplers negative for DVT.

## 2018-09-26 NOTE — PROGRESS NOTE ADULT - PROBLEM SELECTOR PLAN 8
Likely stress induced and d/t steroids   possible UTI  Monitor Hgb A1c 6.1   Continue insulin sliding scale   Monitor fingersticks closely while on steroids.

## 2018-09-26 NOTE — PROGRESS NOTE ADULT - ASSESSMENT
Ms. Son is a 74 y/o F with HTN, prediabetes and chronic back pain 2/2 spinal stenosis p/w worsening of chronic back pain.  Pt reports pain in lower back with R sided sciatica since early 2017.  She had imaging in 2017 which revealed lumbar spinal stenosis.  She had been prescribed gabapentin as well as percocet which she only needed sparingly.  Pt recently has developed worsening lower back pain with paresthesia radiating to L thigh.  Pain makes it difficult to walk, and she feels that her L leg is weaker.  Pt has been unable to lay flat, or stand 2/2 the pain and has spent most of her time sitting.  She has been taking percocet without relief.  She has chronic incontinence, but no new symptoms.  She has been constipated which she attributes to taking percocet more frequently.  She has not had foot numbness or saddle anesthesia.  She had imaging done 2 weeks ago including MRI which showed an infiltrative process at L2 vertebral body, lumbar spinal stenosis and RP soft tissue mass.  CT a/p showed signs suspicious of uterine ca with mets to bone and lung.  Pt was referred to an oncologist with appointment scheduled later this week, but due to increasing severity of pain, she was advised to come to the ED by her PCP.       At [facility] patient receieved CT guided biopsy. Tx to [facility] for further work up and management of spinal lesion (12 Sep 2018 19:54)    PROCEDURE:  Adm 9/13. 9/17 Angio/Embo L2 Lesion. 9/18 L2 Corpectomy excision of intraspinal lesion, T11-L4 Fusion w/plastic closure, CSF leak repaired. Muscle flap closure     POD#9    PLAN:  Neuro: Cont HMVx1 per plastic mgt.   +Diarrhea-laxatives held. Leukocytosis stable-poss from steroids.  9/24 +UA/UA Cx (+Citobacter species) as d/w Medicine will cont Ceftriaxone approximatel for 5 days. , PCA DC'd 9/24. On Decadron to 2mg Q12hr now-DC on 9/27.  Inc activity/OOB. Patient to f/u with oncology for out patient chemotherapy and radiation.  GYN consult appreciated, not a surgical candidate at this time.  NPO MN for MRI Pelvis w/anesth 9/27 4:30pm to characterized mass. For Rehab poss friday if Medicine cleared.    Plastic-Aquacel exchanged, next change 9/27 or prior to d/c.  Remove R drain before discharge. Rest of care per primary team.  Plastic Surgery [phone]    Palliative Care-Chronic low back pain, unspecified back pain laterality, with sciatica presence unspecified.  Plan: Primary team switched off PCA 9/24 to dilaudid 2mg PO/4mg PO/0.5mg IV for moderate/severe/breakthrough pain. Patient reportedly agreeable. Will sign off.     Hemo/Onco-will require outpatient radiation, radiation oncology chart note is noted, plan for radiation as outpatient.  2. Metastatic endometrial cancer patient will need to establish care with medical oncology after discharge, referral to [facility] will be made, for consideration of antineoplastic chemotherapy, this will be offered after radiation the spine is complete.  discussed with patient, her sister and friend today the results of biopsy and treatment plan. Discussed that as tumor has metastasized, Stage IV, it is incurable and treatment will be palliative in nature. Discussed that chemotherapy will likely be offered, but only after completing radiation and as an outpatient. Patient and her sister expressed understanding and all of their questions were answered to the best of my ability to their apparent satisfaction.  Please reconsult oncology fellow if any questions or concerns. Referral is made to [facility] and [facility] number ([phone] New Patient Scheduling) was given to patient to make appointment. Waqas Dan PGY-6, Hematology-Oncology Fellow [phone] ([place]) [phone] ([facility])    GYN-Expansile metastatic infiltration of vertebral body of L2 which is destroyed with associated large left paraspinal and epidural soft tissue component extending into the left neural foramen and spinal canal compressing the descending roots resulting in moderate central canal, severe left lateral recess and foraminal stenosis. Soft tissue mass extends into the left hemipelvis / iliopsoas region. Partially imaged heterogeneous mass in the pelvis likely of uterine origin.  Fullness in the renal collecting system, left greater than right,  hydronephrosis versus parapelvic cysts, as this region is partially imaged correlation with dedicated abdominal/pelvic imaging is suggested    Medicine/Hosp- Problem: UTI (urinary tract infection).  Plan: can continue Ceftriaxone for now as unclear if symptoms were from post-Martinez or UTI. If urine cx neg, d/c abx.   Problem: Edema.  Plan: new RLE since surgery per pt. check dopplers r/o DVT.  Problem: Diarrhea. Plan: likely sec to bowel regimen. laxatives d/c'd. monitor.Problem: Bone lesion. Plan: -likely neoplasm related pain, metastatic bone lesion of L2 causing severe lower back pain control. - Decadron taper a per neurosurgery   Problem: Essential (primary) hypertension. Plan: d/w pt regarding addition of Amlodipine. Pt wants to observe BP another day before starting  Continue LosartanProblem: Malignant neoplasm of uterus, unspecified site.Plan: Outpatient CT suggest uterine mass w/ mets to spine and lung.  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 GYN consult appreciated, not a surgical candidate at this time Onc input, outpatient f/ Problem: Cellulitis, unspecified cellulitis site. Plan: Cellulitis of LLE diagnosed at [facility], resolved.  Completed course of Ancef.  Blood cultures NGTD.  LE dopplers negative for DVT    Respiratory: Patient instructed to use incentive spirometer [ X] YES [ ] NO              DVT ppx: [X ] SQL [ ] SQH and Venodynes [ ] Left [ ] Right [ X] Bilateral    Discharge Planning:  The patient was evaluated by PT/OT/PMR and recommended Sub Acute Rehab.     Assessment:  Please Check When Present   []  GCS  E   V  M     Heart Failure: []Acute, [] acute on chronic , []chronic  Heart Failure:  [] Diastolic (HFpEF), [] Systolic (HFrEF), []Combined (HFpEF and HFrEF), [] RHF, [] Pulm HTN, [] Other    [] CANDIE, [] ATN, [] AIN, [] other  [] CKD1, [] CKD2, [] CKD 3, [] CKD 4, [] CKD 5, []ESRD    Encephalopathy: [] Metabolic, [] Hepatic, [] toxic, [] Neurological, [] Other    Abnormal Nurtitional Status: [] malnurtition (see nutrition note), [ ]underweight: BMI < 19, [] morbid obesity: BMI >40, [] Cachexia    [] Sepsis  [] hypovolemic shock,[] cardiogenic shock, [] hemorrhagic shock, [] neuogenic shock  [] Acute Respiratory Failure  []Cerebral edema, [] Brain compression/ herniation,   [] Functional quadriplegia  [] Acute blood loss anemia

## 2018-09-26 NOTE — PROGRESS NOTE ADULT - ASSESSMENT
73F hx HTN, prediabetes, spinal stenosis 2/2 metastatic lesion now POD#8 s/p T12-L4 fusion and plastics closure (9/18)    - Aquacel exchanged, next change 9/27 or prior to d/c  - Remove R drain before discharge   - f/u urine cultures for UTI   - Continue with ceftriaxone   - Rest of care per primary team    Plastic Surgery  [phone]

## 2018-09-26 NOTE — PROGRESS NOTE ADULT - SUBJECTIVE AND OBJECTIVE BOX
Carlos Machuca   Pager [phone]  Office [phone]      CC: Patient is a 73y old  Female who presents with a chief complaint of L2 lesion (26 Sep 2018 07:21)      SUBJECTIVE / OVERNIGHT EVENTS: Diarrhea throughout the night with increased urinary frequency- now resolved. Feels much better. No f/c/r. No CP/SOB.     MEDICATIONS  (STANDING):  cefTRIAXone   IVPB 1 Gram(s) IV Intermittent every 24 hours  cefTRIAXone   IVPB      dexamethasone     Tablet 2 milliGRAM(s) Oral every 12 hours  dextrose 50% Injectable 12.5 Gram(s) IV Push once  dextrose 50% Injectable 25 Gram(s) IV Push once  dextrose 50% Injectable 25 Gram(s) IV Push once  enoxaparin Injectable 30 milliGRAM(s) SubCutaneous every 12 hours  gabapentin 100 milliGRAM(s) Oral three times a day  influenza   Vaccine 0.5 milliLiter(s) IntraMuscular once  insulin lispro (HumaLOG) corrective regimen sliding scale   SubCutaneous three times a day before meals  insulin lispro (HumaLOG) corrective regimen sliding scale   SubCutaneous at bedtime  losartan 50 milliGRAM(s) Oral daily  nystatin Powder 1 Application(s) Topical two times a day    MEDICATIONS  (PRN):  acetaminophen   Tablet .. 650 milliGRAM(s) Oral every 6 hours PRN Temp greater or equal to 38C (100.4F), Mild Pain (1 - 3)  cyclobenzaprine 5 milliGRAM(s) Oral three times a day PRN Muscle Spasm  dextrose 40% Gel 15 Gram(s) Oral once PRN Blood Glucose LESS THAN 70 milliGRAM(s)/deciliter  diphenhydrAMINE   Capsule 25 milliGRAM(s) Oral every 24 hours PRN Insomnia  docusate sodium 100 milliGRAM(s) Oral three times a day PRN Constipation  glucagon  Injectable 1 milliGRAM(s) IntraMuscular once PRN Glucose LESS THAN 70 milligrams/deciliter  HYDROmorphone   Tablet 2 milliGRAM(s) Oral every 4 hours PRN Moderate Pain (4 - 6)  HYDROmorphone   Tablet 4 milliGRAM(s) Oral every 4 hours PRN Severe Pain (7 - 10)  HYDROmorphone  Injectable 0.5 milliGRAM(s) IV Push every 3 hours PRN Breakthrough pain  naloxone Injectable 0.1 milliGRAM(s) IV Push every 3 minutes PRN For ANY of the following changes in patient status:  A. RR LESS THAN 10 breaths per minute, B. Oxygen saturation LESS THAN 90%, C. Sedation score of 6  ondansetron Injectable 4 milliGRAM(s) IV Push every 6 hours PRN Nausea and/or Vomiting  senna 2 Tablet(s) Oral at bedtime PRN Constipation      Vital Signs Last 24 Hrs  T(C): 36.4 (26 Sep 2018 08:40), Max: 36.9 (25 Sep 2018 20:55)  T(F): 97.5 (26 Sep 2018 08:40), Max: 98.4 (25 Sep 2018 20:55)  HR: 85 (26 Sep 2018 08:40) (74 - 91)  BP: 151/80 (26 Sep 2018 08:40) (113/66 - 159/73)  BP(mean): --  RR: 16 (26 Sep 2018 08:40) (16 - 18)  SpO2: 97% (26 Sep 2018 08:40) (96% - 98%)  CAPILLARY BLOOD GLUCOSE      POCT Blood Glucose.: 127 mg/dL (26 Sep 2018 09:10)  POCT Blood Glucose.: 115 mg/dL (25 Sep 2018 21:47)  POCT Blood Glucose.: 99 mg/dL (25 Sep 2018 17:45)  POCT Blood Glucose.: 169 mg/dL (25 Sep 2018 11:59)    I&O's Summary    25 Sep 2018 07:  -  26 Sep 2018 07:00  --------------------------------------------------------  IN: 1440 mL / OUT: 1100 mL / NET: 340 mL    26 Sep 2018 07:  -  26 Sep 2018 10:19  --------------------------------------------------------  IN: 380 mL / OUT: 0 mL / NET: 380 mL          PHYSICAL EXAM:    GENERAL: NAD   HEENT: EOMI, PERRL  PULM: Clear to auscultation bilaterally  CV: Regular rate and rhythm; nl S1, S2; No murmurs, rubs, or gallops  ABDOMEN: Soft, Nontender, Nondistended; Bowel sounds present  EXTREMITIES/MSK:  Improved 1+ b/l LE edema  PSYCH: AAOx3  NEUROLOGY: 3+/5 b/l prox LE; 5/5 distal LE        LABS:                        10.1   17.7  )-----------( 303      ( 26 Sep 2018 10:09 )             31.0                 Urinalysis Basic - ( 24 Sep 2018 22:15 )    Color: Yellow / Appearance: Slightly Turbid / S.019 / pH: x  Gluc: x / Ketone: Negative  / Bili: Negative / Urobili: Negative mg/dL   Blood: x / Protein: 100 mg/dL / Nitrite: Positive   Leuk Esterase: Large / RBC: 239 /HPF / WBC >720 /HPF   Sq Epi: x / Non Sq Epi: 4 /HPF / Bacteria: TNTC          RADIOLOGY & ADDITIONAL TESTS:    Imaging Personally Reviewed:    Consultant(s) Notes Reviewed:      Care Discussed with Consultants/Other Providers:

## 2018-09-26 NOTE — PROGRESS NOTE ADULT - PROBLEM SELECTOR PLAN 4
BP generally at goal (except during episodes of pain)  Continue Losartan. d/w pt regarding addition of Amlodipine. Pt wants to observe BP another day before starting  Continue Losartan. -likely neoplasm related pain, metastatic bone lesion of L2 causing severe lower back pain   - s/p L2 corpectomy T11-L4 fusion  palliative care following for pain control   - Decadron taper a per neurosurgery   - Flexeril prn muscle spasms   - PT

## 2018-09-26 NOTE — PROGRESS NOTE ADULT - SUBJECTIVE AND OBJECTIVE BOX
SUBJECTIVE: Comfortable, +diarrhea.    OVERNIGHT EVENTS: None    Vital Signs Last 24 Hrs  T(C): 36.8 (24 Sep 2018 09:10), Max: 36.9 (23 Sep 2018 14:34)  T(F): 98.2 (24 Sep 2018 09:10), Max: 98.4 (23 Sep 2018 14:34)  HR: 68 (24 Sep 2018 09:10) (68 - 79)  BP: 123/63 (24 Sep 2018 09:10) (123/63 - 158/81)  BP(mean): --  RR: 18 (24 Sep 2018 09:10) (18 - 18)  SpO2: 96% (24 Sep 2018 09:10) (94% - 97%)  IVF: [X ] IVL [ ] NS+K@   DIET: [ ] Regular [ X] CCD [ ] Renal [ ] Puree [ ] Dysphagia [ ] Tube Feeds:   PCA: [ ] YES [X ] NO-DC'd 9/24  WILSON: [ ] YES [ ] NO [X ] VOID/Incontinence   BM: [X ] YES multiple/diarrhea    DRAINS: [ ] NIMCO (cc/24h) [X ] HMV x 1=50cc (cc/24h)    PHYSICAL EXAM:    Constitutional: No Acute Distress     Neurological: No interval change. AOx3, Speech fluent. Following Commands,   UE 5/5 strength throughout,  b/l LLE: 3/5 HF, 4/5 KF/KE, 5/5 DF/PF, RLE: 4/5 HF/KF/KE, 5/5 DF/PF    Sensation: [X] intact to light touch  [] decreased:     Pulmonary: Clear to Auscultation, No rales, No rhonchi, No wheezes     Cardiovascular: S1, S2, Regular rate and rhythm     Gastrointestinal: Soft, Non-tender, Non-distended     Extremities: No calf tenderness     Incision: HMV x1 active. CDI. FLat    LABS:                           10.1   17.7  )-----------( 303      ( 26 Sep 2018 10:09 )             31.0      09-23    135  |  103  |  23  ----------------------------<  143<H>  4.0   |  24  |  0.61    Ca    9.6      23 Sep 2018 07:22    Urinalysis (09.24.18 @ 22:15)    Glucose Qualitative, Urine: Negative mg/dL    Blood, Urine: Large    pH Urine: 5.5    Color: Yellow    Urine Appearance: Slightly Turbid    Bilirubin: Negative    Ketone - Urine: Negative    Specific Gravity: 1.019    Protein, Urine: 100 mg/dL    Urobilinogen: Negative mg/dL    Nitrite: Positive    Leukocyte Esterase Concentration: Large    Culture - Urine (09.25.18 @ 07:25)    Specimen Source: .Urine Clean Catch (Midstream)    Culture Results:   >100,000 CFU/ml Citrobacter species    IMAGING: < from: CT Lumbar Spine No Cont (09.19.18 @ 10:17) >  Redemonstration of large lytic destructive process involving the L2   vertebral body. Similar appearing 6.4 x 4.2 cm destructive soft tissue   mass approximating the left aspect of the L2 vertebral body.    Interval L2 corpectomy, L1 and L2 total laminectomies, and posterior   fusion of T11-L4 as described. Hardware is well-placed. No evidence of   hardware loosening.    Evaluation of the spinal canal contents is markedly limited by CT   modality and streak artifact from spinal hardware.    MEDICATIONS  (STANDING):  cefTRIAXone   IVPB 1 Gram(s) IV Intermittent every 24 hours  cefTRIAXone   IVPB      dexamethasone     Tablet 2 milliGRAM(s) Oral every 12 hours  dextrose 50% Injectable 12.5 Gram(s) IV Push once  dextrose 50% Injectable 25 Gram(s) IV Push once  dextrose 50% Injectable 25 Gram(s) IV Push once  enoxaparin Injectable 30 milliGRAM(s) SubCutaneous every 12 hours  gabapentin 100 milliGRAM(s) Oral three times a day  influenza   Vaccine 0.5 milliLiter(s) IntraMuscular once  insulin lispro (HumaLOG) corrective regimen sliding scale   SubCutaneous three times a day before meals  insulin lispro (HumaLOG) corrective regimen sliding scale   SubCutaneous at bedtime  losartan 50 milliGRAM(s) Oral daily  nystatin Powder 1 Application(s) Topical two times a day    MEDICATIONS  (PRN):  acetaminophen   Tablet .. 650 milliGRAM(s) Oral every 6 hours PRN Temp greater or equal to 38C (100.4F), Mild Pain (1 - 3)  cyclobenzaprine 5 milliGRAM(s) Oral three times a day PRN Muscle Spasm  dextrose 40% Gel 15 Gram(s) Oral once PRN Blood Glucose LESS THAN 70 milliGRAM(s)/deciliter  diphenhydrAMINE   Capsule 25 milliGRAM(s) Oral every 24 hours PRN Insomnia  glucagon  Injectable 1 milliGRAM(s) IntraMuscular once PRN Glucose LESS THAN 70 milligrams/deciliter  HYDROmorphone   Tablet 2 milliGRAM(s) Oral every 4 hours PRN Moderate Pain (4 - 6)  HYDROmorphone   Tablet 4 milliGRAM(s) Oral every 4 hours PRN Severe Pain (7 - 10)  HYDROmorphone  Injectable 0.5 milliGRAM(s) IV Push every 3 hours PRN Breakthrough pain  naloxone Injectable 0.1 milliGRAM(s) IV Push every 3 minutes PRN For ANY of the following changes in patient status:  A. RR LESS THAN 10 breaths per minute, B. Oxygen saturation LESS THAN 90%, C. Sedation score of 6  ondansetron Injectable 4 milliGRAM(s) IV Push every 6 hours PRN Nausea and/or Vomiting

## 2018-09-26 NOTE — PROGRESS NOTE ADULT - PROBLEM SELECTOR PLAN 6
Cellulitis of LLE diagnosed at [facility], resolved   Completed course of Ancef   Blood cultures NGTD  LE dopplers negative for DVT. 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

## 2018-09-26 NOTE — PROGRESS NOTE ADULT - PROBLEM SELECTOR PLAN 5
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 Pt wants to observe BP another day before starting  Continue Losartan.

## 2018-09-26 NOTE — PROGRESS NOTE ADULT - PROBLEM SELECTOR PLAN 3
-likely neoplasm related pain, metastatic bone lesion of L2 causing severe lower back pain   - s/p L2 corpectomy T11-L4 fusion  palliative care following for pain control   - Decadron taper a per neurosurgery   - Flexeril prn muscle spasms   - PT likely sec to bowel regimen. laxatives d/c'd. monitor.

## 2018-09-27 LAB
-  AMIKACIN: SIGNIFICANT CHANGE UP
-  AMOXICILLIN/CLAVULANIC ACID: SIGNIFICANT CHANGE UP
-  AMPICILLIN/SULBACTAM: SIGNIFICANT CHANGE UP
-  AMPICILLIN: SIGNIFICANT CHANGE UP
-  AZTREONAM: SIGNIFICANT CHANGE UP
-  CEFAZOLIN: SIGNIFICANT CHANGE UP
-  CEFEPIME: SIGNIFICANT CHANGE UP
-  CEFOXITIN: SIGNIFICANT CHANGE UP
-  CEFTRIAXONE: SIGNIFICANT CHANGE UP
-  CIPROFLOXACIN: SIGNIFICANT CHANGE UP
-  ERTAPENEM: SIGNIFICANT CHANGE UP
-  GENTAMICIN: SIGNIFICANT CHANGE UP
-  IMIPENEM: SIGNIFICANT CHANGE UP
-  LEVOFLOXACIN: SIGNIFICANT CHANGE UP
-  MEROPENEM: SIGNIFICANT CHANGE UP
-  NITROFURANTOIN: SIGNIFICANT CHANGE UP
-  PIPERACILLIN/TAZOBACTAM: SIGNIFICANT CHANGE UP
-  TIGECYCLINE: SIGNIFICANT CHANGE UP
-  TOBRAMYCIN: SIGNIFICANT CHANGE UP
-  TRIMETHOPRIM/SULFAMETHOXAZOLE: SIGNIFICANT CHANGE UP
ANION GAP SERPL CALC-SCNC: 9 MMOL/L — SIGNIFICANT CHANGE UP (ref 5–17)
BUN SERPL-MCNC: 14 MG/DL — SIGNIFICANT CHANGE UP (ref 7–23)
CALCIUM SERPL-MCNC: 8.9 MG/DL — SIGNIFICANT CHANGE UP (ref 8.4–10.5)
CHLORIDE SERPL-SCNC: 104 MMOL/L — SIGNIFICANT CHANGE UP (ref 96–108)
CO2 SERPL-SCNC: 26 MMOL/L — SIGNIFICANT CHANGE UP (ref 22–31)
CREAT SERPL-MCNC: 0.63 MG/DL — SIGNIFICANT CHANGE UP (ref 0.5–1.3)
CULTURE RESULTS: SIGNIFICANT CHANGE UP
GLUCOSE BLDC GLUCOMTR-MCNC: 116 MG/DL — HIGH (ref 70–99)
GLUCOSE BLDC GLUCOMTR-MCNC: 118 MG/DL — HIGH (ref 70–99)
GLUCOSE BLDC GLUCOMTR-MCNC: 119 MG/DL — HIGH (ref 70–99)
GLUCOSE BLDC GLUCOMTR-MCNC: 87 MG/DL — SIGNIFICANT CHANGE UP (ref 70–99)
GLUCOSE BLDC GLUCOMTR-MCNC: 93 MG/DL — SIGNIFICANT CHANGE UP (ref 70–99)
GLUCOSE SERPL-MCNC: 116 MG/DL — HIGH (ref 70–99)
HCT VFR BLD CALC: 31.6 % — LOW (ref 34.5–45)
HGB BLD-MCNC: 10.3 G/DL — LOW (ref 11.5–15.5)
MAGNESIUM SERPL-MCNC: 2 MG/DL — SIGNIFICANT CHANGE UP (ref 1.6–2.6)
MCHC RBC-ENTMCNC: 29.1 PG — SIGNIFICANT CHANGE UP (ref 27–34)
MCHC RBC-ENTMCNC: 32.5 GM/DL — SIGNIFICANT CHANGE UP (ref 32–36)
MCV RBC AUTO: 89.4 FL — SIGNIFICANT CHANGE UP (ref 80–100)
METHOD TYPE: SIGNIFICANT CHANGE UP
ORGANISM # SPEC MICROSCOPIC CNT: SIGNIFICANT CHANGE UP
ORGANISM # SPEC MICROSCOPIC CNT: SIGNIFICANT CHANGE UP
PHOSPHATE SERPL-MCNC: 2.6 MG/DL — SIGNIFICANT CHANGE UP (ref 2.5–4.5)
PLATELET # BLD AUTO: 291 K/UL — SIGNIFICANT CHANGE UP (ref 150–400)
POTASSIUM SERPL-MCNC: 4.5 MMOL/L — SIGNIFICANT CHANGE UP (ref 3.5–5.3)
POTASSIUM SERPL-SCNC: 4.5 MMOL/L — SIGNIFICANT CHANGE UP (ref 3.5–5.3)
RBC # BLD: 3.53 M/UL — LOW (ref 3.8–5.2)
RBC # FLD: 15.2 % — HIGH (ref 10.3–14.5)
SODIUM SERPL-SCNC: 138 MMOL/L — SIGNIFICANT CHANGE UP (ref 135–145)
SPECIMEN SOURCE: SIGNIFICANT CHANGE UP
WBC # BLD: 16.6 K/UL — HIGH (ref 3.8–10.5)
WBC # FLD AUTO: 16.6 K/UL — HIGH (ref 3.8–10.5)

## 2018-09-27 PROCEDURE — 99233 SBSQ HOSP IP/OBS HIGH 50: CPT

## 2018-09-27 PROCEDURE — 37191 INS ENDOVAS VENA CAVA FILTR: CPT

## 2018-09-27 PROCEDURE — 99223 1ST HOSP IP/OBS HIGH 75: CPT

## 2018-09-27 PROCEDURE — 72196 MRI PELVIS W/DYE: CPT | Mod: 26

## 2018-09-27 PROCEDURE — 93970 EXTREMITY STUDY: CPT | Mod: 26

## 2018-09-27 RX ORDER — CIPROFLOXACIN LACTATE 400MG/40ML
500 VIAL (ML) INTRAVENOUS EVERY 12 HOURS
Qty: 0 | Refills: 0 | Status: COMPLETED | OUTPATIENT
Start: 2018-09-27 | End: 2018-09-30

## 2018-09-27 RX ORDER — ENOXAPARIN SODIUM 100 MG/ML
30 INJECTION SUBCUTANEOUS EVERY 12 HOURS
Qty: 0 | Refills: 0 | Status: DISCONTINUED | OUTPATIENT
Start: 2018-09-28 | End: 2018-10-01

## 2018-09-27 RX ORDER — INSULIN LISPRO 100/ML
VIAL (ML) SUBCUTANEOUS EVERY 6 HOURS
Qty: 0 | Refills: 0 | Status: DISCONTINUED | OUTPATIENT
Start: 2018-09-27 | End: 2018-09-27

## 2018-09-27 RX ORDER — INSULIN LISPRO 100/ML
VIAL (ML) SUBCUTANEOUS AT BEDTIME
Qty: 0 | Refills: 0 | Status: DISCONTINUED | OUTPATIENT
Start: 2018-09-27 | End: 2018-10-03

## 2018-09-27 RX ORDER — INSULIN LISPRO 100/ML
VIAL (ML) SUBCUTANEOUS
Qty: 0 | Refills: 0 | Status: DISCONTINUED | OUTPATIENT
Start: 2018-09-27 | End: 2018-10-03

## 2018-09-27 RX ADMIN — DEXTROSE MONOHYDRATE, SODIUM CHLORIDE, AND POTASSIUM CHLORIDE 100 MILLILITER(S): 50; .745; 4.5 INJECTION, SOLUTION INTRAVENOUS at 00:32

## 2018-09-27 RX ADMIN — GABAPENTIN 100 MILLIGRAM(S): 400 CAPSULE ORAL at 05:07

## 2018-09-27 RX ADMIN — HYDROMORPHONE HYDROCHLORIDE 4 MILLIGRAM(S): 2 INJECTION INTRAMUSCULAR; INTRAVENOUS; SUBCUTANEOUS at 05:41

## 2018-09-27 RX ADMIN — Medication 500 MILLIGRAM(S): at 13:42

## 2018-09-27 RX ADMIN — GABAPENTIN 100 MILLIGRAM(S): 400 CAPSULE ORAL at 21:59

## 2018-09-27 RX ADMIN — CEFTRIAXONE 100 GRAM(S): 500 INJECTION, POWDER, FOR SOLUTION INTRAMUSCULAR; INTRAVENOUS at 04:16

## 2018-09-27 RX ADMIN — GABAPENTIN 100 MILLIGRAM(S): 400 CAPSULE ORAL at 13:42

## 2018-09-27 RX ADMIN — HYDROMORPHONE HYDROCHLORIDE 4 MILLIGRAM(S): 2 INJECTION INTRAMUSCULAR; INTRAVENOUS; SUBCUTANEOUS at 13:42

## 2018-09-27 RX ADMIN — HYDROMORPHONE HYDROCHLORIDE 4 MILLIGRAM(S): 2 INJECTION INTRAMUSCULAR; INTRAVENOUS; SUBCUTANEOUS at 05:11

## 2018-09-27 RX ADMIN — HYDROMORPHONE HYDROCHLORIDE 4 MILLIGRAM(S): 2 INJECTION INTRAMUSCULAR; INTRAVENOUS; SUBCUTANEOUS at 23:40

## 2018-09-27 RX ADMIN — Medication 2 MILLIGRAM(S): at 05:08

## 2018-09-27 RX ADMIN — HYDROMORPHONE HYDROCHLORIDE 0.5 MILLIGRAM(S): 2 INJECTION INTRAMUSCULAR; INTRAVENOUS; SUBCUTANEOUS at 08:59

## 2018-09-27 RX ADMIN — HYDROMORPHONE HYDROCHLORIDE 4 MILLIGRAM(S): 2 INJECTION INTRAMUSCULAR; INTRAVENOUS; SUBCUTANEOUS at 23:10

## 2018-09-27 RX ADMIN — HYDROMORPHONE HYDROCHLORIDE 4 MILLIGRAM(S): 2 INJECTION INTRAMUSCULAR; INTRAVENOUS; SUBCUTANEOUS at 14:10

## 2018-09-27 RX ADMIN — LOSARTAN POTASSIUM 50 MILLIGRAM(S): 100 TABLET, FILM COATED ORAL at 05:08

## 2018-09-27 RX ADMIN — HYDROMORPHONE HYDROCHLORIDE 0.5 MILLIGRAM(S): 2 INJECTION INTRAMUSCULAR; INTRAVENOUS; SUBCUTANEOUS at 09:15

## 2018-09-27 NOTE — PROGRESS NOTE ADULT - PROBLEM SELECTOR PLAN 7
Cellulitis of LLE diagnosed at [facility], resolved   Completed course of Ancef   Blood cultures NGTD  LE dopplers negative for DVT.

## 2018-09-27 NOTE — PROGRESS NOTE ADULT - SUBJECTIVE AND OBJECTIVE BOX
SUBJECTIVE: Comfortable, Mild back pain.  No diarrhea past 12hrs    OVERNIGHT EVENTS: None    Vital Signs Last 24 Hrs  T(C): 36.8 (24 Sep 2018 09:10), Max: 36.9 (23 Sep 2018 14:34)  T(F): 98.2 (24 Sep 2018 09:10), Max: 98.4 (23 Sep 2018 14:34)  HR: 68 (24 Sep 2018 09:10) (68 - 79)  BP: 123/63 (24 Sep 2018 09:10) (123/63 - 158/81)  BP(mean): --  RR: 18 (24 Sep 2018 09:10) (18 - 18)  SpO2: 96% (24 Sep 2018 09:10) (94% - 97%)  IVF: [ ] IVL [ X] NS+K@ 100 NPO  DIET: [ ] Regular [ X] CCD-NPO for MRI [ ] Renal [ ] Puree [ ] Dysphagia [ ] Tube Feeds:   PCA: [ ] YES [X ] NO-DC'd 9/24  WILSON: [ ] YES [ ] NO [X ] VOID/Incontinence   BM: [X ] YES multiple/diarrhea yesterday, No BM since last PM at 2100    DRAINS: [ ] NIMCO (cc/24h) [X ] HMV x 1=50cc (cc/24h)    PHYSICAL EXAM:    Constitutional: No Acute Distress     Neurological: No interval change. AOx3, Speech fluent. Following Commands,   UE 5/5 strength throughout,  b/l LLE: 3/5 HF, 4/5 KF/KE, 5/5 DF/PF, RLE: 4/5 HF/KF/KE, 5/5 DF/PF    Sensation: [X] intact to light touch  [] decreased:     Pulmonary: Clear to Auscultation, No rales, No rhonchi, No wheezes     Cardiovascular: S1, S2, Regular rate and rhythm     Gastrointestinal: Soft, Non-tender, Non-distended     Extremities: No calf tenderness     Incision: HMV x1 active. CDI. FLat    LABS:                           10.1   17.7  )-----------( 303      ( 26 Sep 2018 10:09 )             31.0      09-23    135  |  103  |  23  ----------------------------<  143<H>  4.0   |  24  |  0.61    Ca    9.6      23 Sep 2018 07:22    Urinalysis (09.24.18 @ 22:15)    Glucose Qualitative, Urine: Negative mg/dL    Blood, Urine: Large    pH Urine: 5.5    Color: Yellow    Urine Appearance: Slightly Turbid    Bilirubin: Negative    Ketone - Urine: Negative    Specific Gravity: 1.019    Protein, Urine: 100 mg/dL    Urobilinogen: Negative mg/dL    Nitrite: Positive    Leukocyte Esterase Concentration: Large    Culture - Urine (09.25.18 @ 07:25)    Specimen Source: .Urine Clean Catch (Midstream)    Culture Results:   >100,000 CFU/ml Citrobacter species    IMAGING: < from: CT Lumbar Spine No Cont (09.19.18 @ 10:17) >  Redemonstration of large lytic destructive process involving the L2   vertebral body. Similar appearing 6.4 x 4.2 cm destructive soft tissue   mass approximating the left aspect of the L2 vertebral body.    Interval L2 corpectomy, L1 and L2 total laminectomies, and posterior   fusion of T11-L4 as described. Hardware is well-placed. No evidence of   hardware loosening.    Evaluation of the spinal canal contents is markedly limited by CT   modality and streak artifact from spinal hardware.    MEDICATIONS  (STANDING):  dexamethasone     Tablet 2 milliGRAM(s) Oral every 12 hours  dextrose 50% Injectable 12.5 Gram(s) IV Push once  dextrose 50% Injectable 25 Gram(s) IV Push once  dextrose 50% Injectable 25 Gram(s) IV Push once  enoxaparin Injectable 30 milliGRAM(s) SubCutaneous every 12 hours  gabapentin 100 milliGRAM(s) Oral three times a day  influenza   Vaccine 0.5 milliLiter(s) IntraMuscular once  insulin lispro (HumaLOG) corrective regimen sliding scale   SubCutaneous every 6 hours  losartan 50 milliGRAM(s) Oral daily  nystatin Powder 1 Application(s) Topical two times a day  sodium chloride 0.9% with potassium chloride 20 mEq/L 1000 milliLiter(s) (100 mL/Hr) IV Continuous <Continuous>    MEDICATIONS  (PRN):  acetaminophen   Tablet .. 650 milliGRAM(s) Oral every 6 hours PRN Temp greater or equal to 38C (100.4F), Mild Pain (1 - 3)  cyclobenzaprine 5 milliGRAM(s) Oral three times a day PRN Muscle Spasm  dextrose 40% Gel 15 Gram(s) Oral once PRN Blood Glucose LESS THAN 70 milliGRAM(s)/deciliter  diphenhydrAMINE   Capsule 25 milliGRAM(s) Oral every 24 hours PRN Insomnia  glucagon  Injectable 1 milliGRAM(s) IntraMuscular once PRN Glucose LESS THAN 70 milligrams/deciliter  HYDROmorphone   Tablet 2 milliGRAM(s) Oral every 4 hours PRN Moderate Pain (4 - 6)  HYDROmorphone   Tablet 4 milliGRAM(s) Oral every 4 hours PRN Severe Pain (7 - 10)  HYDROmorphone  Injectable 0.5 milliGRAM(s) IV Push every 3 hours PRN Breakthrough pain  naloxone Injectable 0.1 milliGRAM(s) IV Push every 3 minutes PRN For ANY of the following changes in patient status:  A. RR LESS THAN 10 breaths per minute, B. Oxygen saturation LESS THAN 90%, C. Sedation score of 6  ondansetron Injectable 4 milliGRAM(s) IV Push every 6 hours PRN Nausea and/or Vomiting

## 2018-09-27 NOTE — PROGRESS NOTE ADULT - ASSESSMENT
Per Dr. Handley: Noted; to see in office in follow up;     Patient notified Dr. Handley is not making any changes at this time and will follow up with him further on Monday. Patient states his understanding. No further questions at this time.    73F h/o morbid obesity (BMI 47.3), HTN, prediabetes, and chronic back pain 2/2 spinal stenosis, p/w worsening of chronic back pain, imaging suspicious for uterine ca with mets to spine and lung, transferred here for neurosurgery eval for L2 lytic lesion.

## 2018-09-27 NOTE — CONSULT NOTE ADULT - ASSESSMENT
Assessment:  1.  Bilateral DVT  2.  Recent spinal surgery  3.  Malignancy  4.  HTN      Plan  1. Assessment:  1.  Bilateral DVT  2.  Recent spinal surgery  3.  Malignancy  4.  HTN      Plan  1.  She has a contraindication for full dose anticoagulation due to recent spinal surgery and a new, symptomatic, acute DVT bilaterally.  Plan for IVC filter placement today with Dr. Kumar (prior to MRI).  2.  Would perform 2d echocardiogram  3.  When safe from a neurosurgical perspective, would transition to full dose anticoagulation (Lovenox 1mg/kg BID)  4.  Will follow with you    Thanks    Zach Caldwell   Vascular Medicine [id number]

## 2018-09-27 NOTE — CONSULT NOTE ADULT - SUBJECTIVE AND OBJECTIVE BOX
Vascular Cardiology      PAGER:  [phone]               [place] [id number]              EMAIL [email or web address]   OFFICE [phone]                              CC:  DVT    INTERVAL HISTORY:  Patient is 73F HTN, spinal stenosis, now s/p L2 corpectomy T11-L4 fusion, malignancy with mets.  Had leg swelling, underwent venous duplex today with acute DVT in the right popliteal vein and left common femoral vein.  She cannot be started on full dose anticoagulation due to the recent spinal/neurosurgery asked to evaluate for IVC filter placement.       Allergies    No Known Allergies    Intolerances    	    MEDICATIONS:  enoxaparin Injectable 30 milliGRAM(s) SubCutaneous every 12 hours  losartan 50 milliGRAM(s) Oral daily        acetaminophen   Tablet .. 650 milliGRAM(s) Oral every 6 hours PRN  cyclobenzaprine 5 milliGRAM(s) Oral three times a day PRN  diphenhydrAMINE   Capsule 25 milliGRAM(s) Oral every 24 hours PRN  gabapentin 100 milliGRAM(s) Oral three times a day  HYDROmorphone   Tablet 2 milliGRAM(s) Oral every 4 hours PRN  HYDROmorphone   Tablet 4 milliGRAM(s) Oral every 4 hours PRN  HYDROmorphone  Injectable 0.5 milliGRAM(s) IV Push every 3 hours PRN  ondansetron Injectable 4 milliGRAM(s) IV Push every 6 hours PRN      dextrose 40% Gel 15 Gram(s) Oral once PRN  dextrose 50% Injectable 12.5 Gram(s) IV Push once  dextrose 50% Injectable 25 Gram(s) IV Push once  dextrose 50% Injectable 25 Gram(s) IV Push once  glucagon  Injectable 1 milliGRAM(s) IntraMuscular once PRN  insulin lispro (HumaLOG) corrective regimen sliding scale   SubCutaneous every 6 hours    influenza   Vaccine 0.5 milliLiter(s) IntraMuscular once  nystatin Powder 1 Application(s) Topical two times a day  sodium chloride 0.9% with potassium chloride 20 mEq/L 1000 milliLiter(s) IV Continuous <Continuous>      PAST MEDICAL & SURGICAL HISTORY:  Lumbar spinal stenosis  Prediabetes  Essential (primary) hypertension  S/P right knee arthroscopy  S/P dilation and curettage: Early 2000&#x27;s for DUB, findings benign.  S/P cholecystectomy      FAMILY HISTORY:  Family history of acute myocardial infarction (Mother): mother  Family history of diabetes mellitus (Mother, Sibling)      SOCIAL HISTORY:  unchanged    REVIEW OF SYSTEMS:  CONSTITUTIONAL: No fever, weight loss, or fatigue  EYES: No eye pain, visual disturbances, or discharge  ENMT:  No difficulty hearing, tinnitus, vertigo; No sinus or throat pain  NECK: No pain or stiffness  RESPIRATORY: No cough, wheezing, chills or hemoptysis; No Shortness of Breath  CARDIOVASCULAR: No chest pain, palpitations, passing out, dizziness, or leg swelling  GASTROINTESTINAL: No abdominal or epigastric pain. No nausea, vomiting, or hematemesis; No diarrhea or constipation. No melena or hematochezia.  GENITOURINARY: No dysuria, frequency, hematuria, or incontinence  NEUROLOGICAL: No headaches, memory loss, loss of strength, numbness, or tremors  SKIN: No itching, burning, rashes, or lesions   LYMPH Nodes: No enlarged glands  ENDOCRINE: No heat or cold intolerance; No hair loss  MUSCULOSKELETAL: No joint pain or swelling; No muscle, back, or extremity pain  PSYCHIATRIC: No depression, anxiety, mood swings, or difficulty sleeping  HEME/LYMPH: No easy bruising, or bleeding gums  ALLERY AND IMMUNOLOGIC: No hives or eczema	    [ x] All others negative	  [ ] Unable to obtain    PHYSICAL EXAM:  T(C): 36.5 (09-27-18 @ 09:06), Max: 37.2 (09-27-18 @ 05:00)  HR: 82 (09-27-18 @ 09:06) (75 - 84)  BP: 146/71 (09-27-18 @ 09:06) (137/66 - 162/72)  RR: 16 (09-27-18 @ 09:06) (16 - 18)  SpO2: 97% (09-27-18 @ 09:06) (97% - 99%)  Wt(kg): --  I&O's Summary    26 Sep 2018 07:01  -  27 Sep 2018 07:00  --------------------------------------------------------  IN: 1700 mL / OUT: 700 mL / NET: 1000 mL        Appearance: Normal	  HEENT:   Normal oral mucosa, PERRL, EOMI	  Lymphatic: No lymphadenopathy  Cardiovascular: Normal S1 S2, No JVD, No murmurs, No edema  Respiratory: Lungs clear to auscultation	  Psychiatry: A & O x 3, Mood & affect appropriate  Gastrointestinal:  Soft, Non-tender, + BS	  Skin: No rashes, No ecchymoses, No cyanosis	  Neurologic: Non-focal  Extremities: Normal range of motion, No clubbing, cyanosis or edema  Vascular: Peripheral pulses palpable 2+ bilaterally      LABS:	 	    CBC Full  -  ( 27 Sep 2018 11:13 )  WBC Count : 16.6 K/uL  Hemoglobin : 10.3 g/dL  Hematocrit : 31.6 %  Platelet Count - Automated : 291 K/uL  Mean Cell Volume : 89.4 fl  Mean Cell Hemoglobin : 29.1 pg  Mean Cell Hemoglobin Concentration : 32.5 gm/dL  Auto Neutrophil # : x  Auto Lymphocyte # : x  Auto Monocyte # : x  Auto Eosinophil # : x  Auto Basophil # : x  Auto Neutrophil % : x  Auto Lymphocyte % : x  Auto Monocyte % : x  Auto Eosinophil % : x  Auto Basophil % : x    09-27    138  |  104  |  x   ----------------------------<  x   4.5   |  x   |  x Vascular Cardiology      PAGER:  [phone]               [facility] [id number]              EMAIL [email or web address]   OFFICE [phone]                              CC:  DVT    INTERVAL HISTORY:  Patient is 73F HTN, spinal stenosis, now s/p L2 corpectomy T11-L4 fusion, malignancy with mets.  Had leg swelling, underwent venous duplex today with acute DVT in the right popliteal vein and left common femoral vein.  She cannot be started on full dose anticoagulation due to the recent spinal/neurosurgery asked to evaluate for IVC filter placement.  No prior DVT or VTE in the past.  She has no chest pain or shortness of breath.        Allergies    No Known Allergies    Intolerances    	    MEDICATIONS:  enoxaparin Injectable 30 milliGRAM(s) SubCutaneous every 12 hours  losartan 50 milliGRAM(s) Oral daily        acetaminophen   Tablet .. 650 milliGRAM(s) Oral every 6 hours PRN  cyclobenzaprine 5 milliGRAM(s) Oral three times a day PRN  diphenhydrAMINE   Capsule 25 milliGRAM(s) Oral every 24 hours PRN  gabapentin 100 milliGRAM(s) Oral three times a day  HYDROmorphone   Tablet 2 milliGRAM(s) Oral every 4 hours PRN  HYDROmorphone   Tablet 4 milliGRAM(s) Oral every 4 hours PRN  HYDROmorphone  Injectable 0.5 milliGRAM(s) IV Push every 3 hours PRN  ondansetron Injectable 4 milliGRAM(s) IV Push every 6 hours PRN      dextrose 40% Gel 15 Gram(s) Oral once PRN  dextrose 50% Injectable 12.5 Gram(s) IV Push once  dextrose 50% Injectable 25 Gram(s) IV Push once  dextrose 50% Injectable 25 Gram(s) IV Push once  glucagon  Injectable 1 milliGRAM(s) IntraMuscular once PRN  insulin lispro (HumaLOG) corrective regimen sliding scale   SubCutaneous every 6 hours    influenza   Vaccine 0.5 milliLiter(s) IntraMuscular once  nystatin Powder 1 Application(s) Topical two times a day  sodium chloride 0.9% with potassium chloride 20 mEq/L 1000 milliLiter(s) IV Continuous <Continuous>      PAST MEDICAL & SURGICAL HISTORY:  Lumbar spinal stenosis  Prediabetes  Essential (primary) hypertension  S/P right knee arthroscopy  S/P dilation and curettage: Early 2000&#x27;s for DUB, findings benign.  S/P cholecystectomy      FAMILY HISTORY:  Family history of acute myocardial infarction (Mother): mother  Family history of diabetes mellitus (Mother, Sibling)      SOCIAL HISTORY:  unchanged    REVIEW OF SYSTEMS:  CONSTITUTIONAL: No fever, weight loss, or fatigue  EYES: No eye pain, visual disturbances, or discharge  ENMT:  No difficulty hearing, tinnitus, vertigo; No sinus or throat pain  NECK: No pain or stiffness  RESPIRATORY: No cough, wheezing, chills or hemoptysis; No Shortness of Breath  CARDIOVASCULAR: No chest pain, palpitations, passing out, dizziness,  +leg swelling  GASTROINTESTINAL: No abdominal or epigastric pain. No nausea, vomiting, or hematemesis; No diarrhea or constipation. No melena or hematochezia.  GENITOURINARY: No dysuria, frequency, hematuria, or incontinence  NEUROLOGICAL: No headaches, memory loss, loss of strength, numbness, or tremors  SKIN: No itching, burning, rashes, or lesions   LYMPH Nodes: No enlarged glands  ENDOCRINE: No heat or cold intolerance; No hair loss  MUSCULOSKELETAL: No joint pain or swelling; No muscle, back, or extremity pain  PSYCHIATRIC: No depression, anxiety, mood swings, or difficulty sleeping  HEME/LYMPH: No easy bruising, or bleeding gums  ALLERY AND IMMUNOLOGIC: No hives or eczema	    [ x] All others negative	  [ ] Unable to obtain    PHYSICAL EXAM:  T(C): 36.5 (09-27-18 @ 09:06), Max: 37.2 (09-27-18 @ 05:00)  HR: 82 (09-27-18 @ 09:06) (75 - 84)  BP: 146/71 (09-27-18 @ 09:06) (137/66 - 162/72)  RR: 16 (09-27-18 @ 09:06) (16 - 18)  SpO2: 97% (09-27-18 @ 09:06) (97% - 99%)  Wt(kg): --  I&O's Summary    26 Sep 2018 07:01  -  27 Sep 2018 07:00  --------------------------------------------------------  IN: 1700 mL / OUT: 700 mL / NET: 1000 mL        Appearance: Normal	, overweight  HEENT:   Normal oral mucosa, PERRL, EOMI	  Lymphatic: No lymphadenopathy  Cardiovascular: Normal S1 S2, No JVD, No murmurs, No edema  Respiratory: Lungs clear to auscultation	  Psychiatry: A & O x 3, Mood & affect appropriate  Gastrointestinal:  Soft, Non-tender, + BS	  Skin: No rashes, No ecchymoses, No cyanosis	  Neurologic: Non-focal  Extremities: Normal range of motion, No clubbing, cyanosis or edema  Vascular: Peripheral pulses palpable 2+ bilaterally      LABS:	 	    CBC Full  -  ( 27 Sep 2018 11:13 )  WBC Count : 16.6 K/uL  Hemoglobin : 10.3 g/dL  Hematocrit : 31.6 %  Platelet Count - Automated : 291 K/uL  Mean Cell Volume : 89.4 fl  Mean Cell Hemoglobin : 29.1 pg  Mean Cell Hemoglobin Concentration : 32.5 gm/dL  Auto Neutrophil # : x  Auto Lymphocyte # : x  Auto Monocyte # : x  Auto Eosinophil # : x  Auto Basophil # : x  Auto Neutrophil % : x  Auto Lymphocyte % : x  Auto Monocyte % : x  Auto Eosinophil % : x  Auto Basophil % : x    09-27    138  |  104  |  x   ----------------------------<  x   4.5   |  x   |  x

## 2018-09-27 NOTE — PROGRESS NOTE ADULT - SUBJECTIVE AND OBJECTIVE BOX
Plastic Surgery Progress Note    Subjective: Patient examined at bedside. Patient doing well, no complaints at this time. Patient may leave today, pending rehab placement.      Objective:    Vital Signs Last 24 Hrs  T(C): 37.2 (27 Sep 2018 05:00), Max: 37.2 (27 Sep 2018 05:00)  T(F): 99 (27 Sep 2018 05:00), Max: 99 (27 Sep 2018 05:00)  HR: 75 (27 Sep 2018 06:23) (75 - 85)  BP: 145/67 (27 Sep 2018 06:23) (137/66 - 162/72)  BP(mean): --  RR: 18 (27 Sep 2018 05:00) (16 - 18)  SpO2: 97% (27 Sep 2018 05:00) (97% - 99%)    I&O's Summary    26 Sep 2018 07:01  -  27 Sep 2018 07:00  --------------------------------------------------------  IN: 1700 mL / OUT: 700 mL / NET: 1000 mL      	      Exam:  General: NAD  Respiratory: Breathing comfortably  Cardiovascular: NSR  Back: soft, dressing in place c/d/i.  No collections, 1 NIMCO in place with serosanguinous output

## 2018-09-27 NOTE — PROGRESS NOTE ADULT - ASSESSMENT
Ms. Son is a 74 y/o F with HTN, prediabetes and chronic back pain 2/2 spinal stenosis p/w worsening of chronic back pain.  Pt reports pain in lower back with R sided sciatica since early 2017.  She had imaging in 2017 which revealed lumbar spinal stenosis.  She had been prescribed gabapentin as well as percocet which she only needed sparingly.  Pt recently has developed worsening lower back pain with paresthesia radiating to L thigh.  Pain makes it difficult to walk, and she feels that her L leg is weaker.  Pt has been unable to lay flat, or stand 2/2 the pain and has spent most of her time sitting.  She has been taking percocet without relief.  She has chronic incontinence, but no new symptoms.  She has been constipated which she attributes to taking percocet more frequently.  She has not had foot numbness or saddle anesthesia.  She had imaging done 2 weeks ago including MRI which showed an infiltrative process at L2 vertebral body, lumbar spinal stenosis and RP soft tissue mass.  CT a/p showed signs suspicious of uterine ca with mets to bone and lung.  Pt was referred to an oncologist with appointment scheduled later this week, but due to increasing severity of pain, she was advised to come to the ED by her PCP.       At [facility] patient receieved CT guided biopsy. Tx to [facility] for further work up and management of spinal lesion (12 Sep 2018 19:54)    PROCEDURE:  Adm 9/13. 9/17 Angio/Embo L2 Lesion. 9/18 L2 Corpectomy excision of intraspinal lesion, T11-L4 Fusion w/plastic closure, CSF leak repaired. Muscle flap closure     POD#10    PLAN:  Neuro: Cont HMVx1 per plastic mgt.   +Diarrhea-laxatives held. Leukocytosis stable-poss from steroids.  9/24 +UA/UA Cx (+Citobacter species) as d/w Medicine will cont Ceftriaxone pending Cx sensitivity.  PCA DC'd 9/24. Decadron DC'd 9/27.  Inc activity/OOB. Patient to f/u with oncology for out patient chemotherapy and radiation.  GYN consult appreciated, not a surgical candidate at this time.  Dopp LE-P>Dept made aware to do study today. NPO MN for MRI Pelvis w/anesth 4:30pm to characterized mass. For Rehab poss Friday if Medicine cleared.CBC, BMP, Lytes-P FU.    Plastic-Aquacel exchanged, next change 9/27 or prior to d/c.  Remove R drain before discharge. Rest of care per primary team.  Plastic Surgery [phone]    Palliative Care-Chronic low back pain, unspecified back pain laterality, with sciatica presence unspecified.  Plan: Primary team switched off PCA 9/24 to dilaudid 2mg PO/4mg PO/0.5mg IV for moderate/severe/breakthrough pain. Patient reportedly agreeable. Will sign off.     Hemo/Onco-will require outpatient radiation, radiation oncology chart note is noted, plan for radiation as outpatient.  2. Metastatic endometrial cancer patient will need to establish care with medical oncology after discharge, referral to [facility] will be made, for consideration of antineoplastic chemotherapy, this will be offered after radiation the spine is complete.  discussed with patient, her sister and friend today the results of biopsy and treatment plan. Discussed that as tumor has metastasized, Stage IV, it is incurable and treatment will be palliative in nature. Discussed that chemotherapy will likely be offered, but only after completing radiation and as an outpatient. Patient and her sister expressed understanding and all of their questions were answered to the best of my ability to their apparent satisfaction.  Please reconsult oncology fellow if any questions or concerns. Referral is made to [facility] and [facility]'s number ([phone] New Patient Scheduling) was given to patient to make appointment. Waqas Dan PGY-6, Hematology-Oncology Fellow [phone] ([place]) [phone] ([facility])    GYN-Expansile metastatic infiltration of vertebral body of L2 which is destroyed with associated large left paraspinal and epidural soft tissue component extending into the left neural foramen and spinal canal compressing the descending roots resulting in moderate central canal, severe left lateral recess and foraminal stenosis. Soft tissue mass extends into the left hemipelvis / iliopsoas region. Partially imaged heterogeneous mass in the pelvis likely of uterine origin.  Fullness in the renal collecting system, left greater than right,  hydronephrosis versus parapelvic cysts, as this region is partially imaged correlation with dedicated abdominal/pelvic imaging is suggested    Medicine/Hosp- Problem: UTI (urinary tract infection).  Plan: can continue Ceftriaxone for now as unclear if symptoms were from post-Martinez or UTI. If urine cx neg, d/c abx.   Problem: Edema.  Plan: new RLE since surgery per pt. check dopplers r/o DVT.  Problem: Diarrhea. Plan: likely sec to bowel regimen. laxatives d/c'd. monitor.Problem: Bone lesion. Plan: -likely neoplasm related pain, metastatic bone lesion of L2 causing severe lower back pain control. - Decadron taper a per neurosurgery   Problem: Essential (primary) hypertension. Plan: d/w pt regarding addition of Amlodipine. Pt wants to observe BP another day before starting  Continue LosartanProblem: Malignant neoplasm of uterus, unspecified site.Plan: Outpatient CT suggest uterine mass w/ mets to spine and lung.  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 GYN consult appreciated, not a surgical candidate at this time Onc input, outpatient f/ Problem: Cellulitis, unspecified cellulitis site. Plan: Cellulitis of LLE diagnosed at [facility], resolved.  Completed course of Ancef.  Blood cultures NGTD.  LE dopplers negative for DVT    Respiratory: Patient instructed to use incentive spirometer [ X] YES [ ] NO              DVT ppx: [X ] SQL [ ] SQH and Venodynes [ ] Left [ ] Right [ X] Bilateral    Discharge Planning:  The patient was evaluated by PT/OT/PMR and recommended Sub Acute Rehab.     Assessment:  Please Check When Present   []  GCS  E   V  M     Heart Failure: []Acute, [] acute on chronic , []chronic  Heart Failure:  [] Diastolic (HFpEF), [] Systolic (HFrEF), []Combined (HFpEF and HFrEF), [] RHF, [] Pulm HTN, [] Other    [] CANDIE, [] ATN, [] AIN, [] other  [] CKD1, [] CKD2, [] CKD 3, [] CKD 4, [] CKD 5, []ESRD    Encephalopathy: [] Metabolic, [] Hepatic, [] toxic, [] Neurological, [] Other    Abnormal Nurtitional Status: [] malnurtition (see nutrition note), [ ]underweight: BMI < 19, [] morbid obesity: BMI >40, [] Cachexia    [] Sepsis  [] hypovolemic shock,[] cardiogenic shock, [] hemorrhagic shock, [] neuogenic shock  [] Acute Respiratory Failure  []Cerebral edema, [] Brain compression/ herniation,   [] Functional quadriplegia  [] Acute blood loss anemia Ms. Son is a 72 y/o F with HTN, prediabetes and chronic back pain 2/2 spinal stenosis p/w worsening of chronic back pain.  Pt reports pain in lower back with R sided sciatica since early 2017.  She had imaging in 2017 which revealed lumbar spinal stenosis.  She had been prescribed gabapentin as well as percocet which she only needed sparingly.  Pt recently has developed worsening lower back pain with paresthesia radiating to L thigh.  Pain makes it difficult to walk, and she feels that her L leg is weaker.  Pt has been unable to lay flat, or stand 2/2 the pain and has spent most of her time sitting.  She has been taking percocet without relief.  She has chronic incontinence, but no new symptoms.  She has been constipated which she attributes to taking percocet more frequently.  She has not had foot numbness or saddle anesthesia.  She had imaging done 2 weeks ago including MRI which showed an infiltrative process at L2 vertebral body, lumbar spinal stenosis and RP soft tissue mass.  CT a/p showed signs suspicious of uterine ca with mets to bone and lung.  Pt was referred to an oncologist with appointment scheduled later this week, but due to increasing severity of pain, she was advised to come to the ED by her PCP.       At [facility] patient receieved CT guided biopsy. Tx to [facility] for further work up and management of spinal lesion (12 Sep 2018 19:54)    PROCEDURE:  Adm 9/13. 9/17 Angio/Embo L2 Lesion. 9/18 L2 Corpectomy excision of intraspinal lesion, T11-L4 Fusion w/plastic closure, CSF leak repaired. Muscle flap closure     POD#10    PLAN:  Neuro: Cont HMVx1 per plastic mgt.   +Diarrhea-laxatives held. Leukocytosis stable-poss from steroids.  9/24 +UA/UA Cx (+Citobacter species) as d/w Medicine will cont Ceftriaxone pending Cx sensitivity.  PCA DC'd 9/24. Decadron DC'd 9/27.  Inc activity/OOB. Patient to f/u with oncology for out patient chemotherapy and radiation.  GYN consult appreciated, not a surgical candidate at this time.  Dopp LE-P>Dept made aware to do study today. NPO MN for MRI Pelvis w/anesth 4:30pm to characterized mass. For Rehab poss Friday if Medicine cleared.CBC, BMP, Lytes-P FU.    1215 Dopplers LE done today-Called by peripheral Vascular that pt has B/L LE DVT. This was d/w Dr Caceres at this time and advised to place IVCF.  Dr CHAUNCEY Irene cons for IVCF placement today and he was made aware that patient is having MRI Abd with Anesthesia at 4:30pm today and he will try to coordinate placement of IVCF.     Plastic-Aquacel exchanged, next change 9/27 or prior to d/c.  Remove R drain before discharge. Rest of care per primary team.  Plastic Surgery [phone]    Palliative Care-Chronic low back pain, unspecified back pain laterality, with sciatica presence unspecified.  Plan: Primary team switched off PCA 9/24 to dilaudid 2mg PO/4mg PO/0.5mg IV for moderate/severe/breakthrough pain. Patient reportedly agreeable. Will sign off.     Hemo/Onco-will require outpatient radiation, radiation oncology chart note is noted, plan for radiation as outpatient.  2. Metastatic endometrial cancer patient will need to establish care with medical oncology after discharge, referral to [facility] will be made, for consideration of antineoplastic chemotherapy, this will be offered after radiation the spine is complete.  discussed with patient, her sister and friend today the results of biopsy and treatment plan. Discussed that as tumor has metastasized, Stage IV, it is incurable and treatment will be palliative in nature. Discussed that chemotherapy will likely be offered, but only after completing radiation and as an outpatient. Patient and her sister expressed understanding and all of their questions were answered to the best of my ability to their apparent satisfaction.  Please reconsult oncology fellow if any questions or concerns. Referral is made to [facility] and [facility]'s number ([phone] New Patient Scheduling) was given to patient to make appointment. Waqas Dan PGY-6, Hematology-Oncology Fellow [phone] ([place]) [phone] ([facility])    GYN-Expansile metastatic infiltration of vertebral body of L2 which is destroyed with associated large left paraspinal and epidural soft tissue component extending into the left neural foramen and spinal canal compressing the descending roots resulting in moderate central canal, severe left lateral recess and foraminal stenosis. Soft tissue mass extends into the left hemipelvis / iliopsoas region. Partially imaged heterogeneous mass in the pelvis likely of uterine origin.  Fullness in the renal collecting system, left greater than right,  hydronephrosis versus parapelvic cysts, as this region is partially imaged correlation with dedicated abdominal/pelvic imaging is suggested    Medicine/Hosp- Problem: UTI (urinary tract infection).  Plan: can continue Ceftriaxone for now as unclear if symptoms were from post-Martinez or UTI. If urine cx neg, d/c abx.   Problem: Edema.  Plan: new RLE since surgery per pt. check dopplers r/o DVT.  Problem: Diarrhea. Plan: likely sec to bowel regimen. laxatives d/c'd. monitor.Problem: Bone lesion. Plan: -likely neoplasm related pain, metastatic bone lesion of L2 causing severe lower back pain control. - Decadron taper a per neurosurgery   Problem: Essential (primary) hypertension. Plan: d/w pt regarding addition of Amlodipine. Pt wants to observe BP another day before starting  Continue LosartanProblem: Malignant neoplasm of uterus, unspecified site.Plan: Outpatient CT suggest uterine mass w/ mets to spine and lung.  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 GYN consult appreciated, not a surgical candidate at this time Onc input, outpatient f/ Problem: Cellulitis, unspecified cellulitis site. Plan: Cellulitis of LLE diagnosed at [facility], resolved.  Completed course of Ancef.  Blood cultures NGTD.  LE dopplers negative for DVT    Respiratory: Patient instructed to use incentive spirometer [ X] YES [ ] NO              DVT ppx: [X ] SQL [ ] SQH and Venodynes [ ] Left [ ] Right [ X] Bilateral    Discharge Planning:  The patient was evaluated by PT/OT/PMR and recommended Sub Acute Rehab.     Assessment:  Please Check When Present   []  GCS  E   V  M     Heart Failure: []Acute, [] acute on chronic , []chronic  Heart Failure:  [] Diastolic (HFpEF), [] Systolic (HFrEF), []Combined (HFpEF and HFrEF), [] RHF, [] Pulm HTN, [] Other    [] CANDIE, [] ATN, [] AIN, [] other  [] CKD1, [] CKD2, [] CKD 3, [] CKD 4, [] CKD 5, []ESRD    Encephalopathy: [] Metabolic, [] Hepatic, [] toxic, [] Neurological, [] Other    Abnormal Nurtitional Status: [] malnurtition (see nutrition note), [ ]underweight: BMI < 19, [] morbid obesity: BMI >40, [] Cachexia    [] Sepsis  [] hypovolemic shock,[] cardiogenic shock, [] hemorrhagic shock, [] neuogenic shock  [] Acute Respiratory Failure  []Cerebral edema, [] Brain compression/ herniation,   [] Functional quadriplegia  [] Acute blood loss anemia

## 2018-09-27 NOTE — PROGRESS NOTE ADULT - SUBJECTIVE AND OBJECTIVE BOX
Carlos Machuca   Pager [phone]  Office [phone]      CC: Patient is a 73y old  Female who presents with a chief complaint of L2 lesion (27 Sep 2018 07:44)      SUBJECTIVE / OVERNIGHT EVENTS: No further diarrhea. Normal urinary frequency. No f/c/r. Had some increased surgical site pain today. No CP/SOB. No f/c/r.     MEDICATIONS  (STANDING):  dextrose 50% Injectable 12.5 Gram(s) IV Push once  dextrose 50% Injectable 25 Gram(s) IV Push once  dextrose 50% Injectable 25 Gram(s) IV Push once  enoxaparin Injectable 30 milliGRAM(s) SubCutaneous every 12 hours  gabapentin 100 milliGRAM(s) Oral three times a day  influenza   Vaccine 0.5 milliLiter(s) IntraMuscular once  insulin lispro (HumaLOG) corrective regimen sliding scale   SubCutaneous every 6 hours  losartan 50 milliGRAM(s) Oral daily  nystatin Powder 1 Application(s) Topical two times a day  sodium chloride 0.9% with potassium chloride 20 mEq/L 1000 milliLiter(s) (100 mL/Hr) IV Continuous <Continuous>    MEDICATIONS  (PRN):  acetaminophen   Tablet .. 650 milliGRAM(s) Oral every 6 hours PRN Temp greater or equal to 38C (100.4F), Mild Pain (1 - 3)  cyclobenzaprine 5 milliGRAM(s) Oral three times a day PRN Muscle Spasm  dextrose 40% Gel 15 Gram(s) Oral once PRN Blood Glucose LESS THAN 70 milliGRAM(s)/deciliter  diphenhydrAMINE   Capsule 25 milliGRAM(s) Oral every 24 hours PRN Insomnia  glucagon  Injectable 1 milliGRAM(s) IntraMuscular once PRN Glucose LESS THAN 70 milligrams/deciliter  HYDROmorphone   Tablet 2 milliGRAM(s) Oral every 4 hours PRN Moderate Pain (4 - 6)  HYDROmorphone   Tablet 4 milliGRAM(s) Oral every 4 hours PRN Severe Pain (7 - 10)  HYDROmorphone  Injectable 0.5 milliGRAM(s) IV Push every 3 hours PRN Breakthrough pain  naloxone Injectable 0.1 milliGRAM(s) IV Push every 3 minutes PRN For ANY of the following changes in patient status:  A. RR LESS THAN 10 breaths per minute, B. Oxygen saturation LESS THAN 90%, C. Sedation score of 6  ondansetron Injectable 4 milliGRAM(s) IV Push every 6 hours PRN Nausea and/or Vomiting      Vital Signs Last 24 Hrs  T(C): 36.5 (27 Sep 2018 09:06), Max: 37.2 (27 Sep 2018 05:00)  T(F): 97.7 (27 Sep 2018 09:06), Max: 99 (27 Sep 2018 05:00)  HR: 82 (27 Sep 2018 09:06) (75 - 84)  BP: 146/71 (27 Sep 2018 09:06) (137/66 - 162/72)  BP(mean): --  RR: 16 (27 Sep 2018 09:06) (16 - 18)  SpO2: 97% (27 Sep 2018 09:06) (97% - 99%)  CAPILLARY BLOOD GLUCOSE      POCT Blood Glucose.: 116 mg/dL (27 Sep 2018 06:12)  POCT Blood Glucose.: 128 mg/dL (26 Sep 2018 22:00)  POCT Blood Glucose.: 122 mg/dL (26 Sep 2018 17:10)  POCT Blood Glucose.: 128 mg/dL (26 Sep 2018 12:53)    I&O's Summary    26 Sep 2018 07:01  -  27 Sep 2018 07:00  --------------------------------------------------------  IN: 1700 mL / OUT: 700 mL / NET: 1000 mL          PHYSICAL EXAM:    GENERAL: NAD   HEENT: EOMI, PERRL  PULM: Clear to auscultation bilaterally  CV: Regular rate and rhythm; nl S1, S2; No murmurs, rubs, or gallops  ABDOMEN: Soft, Nontender, Nondistended; Bowel sounds present  EXTREMITIES/MSK: 1+ b/l LE edema unchanged   PSYCH: AAOx3  NEUROLOGY: 3+/5 prox b/l LE; 5/5 b/l distal LE          LABS:                        10.1   17.7  )-----------( 303      ( 26 Sep 2018 10:09 )             31.0                       Culture - Urine (collected 25 Sep 2018 07:25)  Source: .Urine Clean Catch (Midstream)  Preliminary Report (26 Sep 2018 11:38):    >100,000 CFU/ml Citrobacter species      RADIOLOGY & ADDITIONAL TESTS:    Imaging Personally Reviewed:    Consultant(s) Notes Reviewed:      Care Discussed with Consultants/Other Providers: neurosurg PA regarding abx for UTI

## 2018-09-27 NOTE — PROGRESS NOTE ADULT - ASSESSMENT
73F hx HTN, prediabetes, spinal stenosis 2/2 metastatic lesion now POD#8 s/p T12-L4 fusion and plastics closure (9/18)    - Aquacel exchanged today  - Remove R drain before discharge   - f/u urine cultures for UTI   - Continue with ceftriaxone   - Rest of care per primary team    Plastic Surgery  [phone]

## 2018-09-28 DIAGNOSIS — I82.409 ACUTE EMBOLISM AND THROMBOSIS OF UNSPECIFIED DEEP VEINS OF UNSPECIFIED LOWER EXTREMITY: ICD-10-CM

## 2018-09-28 LAB
GLUCOSE BLDC GLUCOMTR-MCNC: 117 MG/DL — HIGH (ref 70–99)
GLUCOSE BLDC GLUCOMTR-MCNC: 118 MG/DL — HIGH (ref 70–99)
GLUCOSE BLDC GLUCOMTR-MCNC: 123 MG/DL — HIGH (ref 70–99)
GLUCOSE BLDC GLUCOMTR-MCNC: 97 MG/DL — SIGNIFICANT CHANGE UP (ref 70–99)

## 2018-09-28 PROCEDURE — 99233 SBSQ HOSP IP/OBS HIGH 50: CPT

## 2018-09-28 RX ADMIN — HYDROMORPHONE HYDROCHLORIDE 4 MILLIGRAM(S): 2 INJECTION INTRAMUSCULAR; INTRAVENOUS; SUBCUTANEOUS at 13:22

## 2018-09-28 RX ADMIN — GABAPENTIN 100 MILLIGRAM(S): 400 CAPSULE ORAL at 13:21

## 2018-09-28 RX ADMIN — Medication 500 MILLIGRAM(S): at 17:19

## 2018-09-28 RX ADMIN — GABAPENTIN 100 MILLIGRAM(S): 400 CAPSULE ORAL at 22:23

## 2018-09-28 RX ADMIN — HYDROMORPHONE HYDROCHLORIDE 4 MILLIGRAM(S): 2 INJECTION INTRAMUSCULAR; INTRAVENOUS; SUBCUTANEOUS at 17:23

## 2018-09-28 RX ADMIN — HYDROMORPHONE HYDROCHLORIDE 4 MILLIGRAM(S): 2 INJECTION INTRAMUSCULAR; INTRAVENOUS; SUBCUTANEOUS at 22:23

## 2018-09-28 RX ADMIN — NYSTATIN CREAM 1 APPLICATION(S): 100000 CREAM TOPICAL at 05:06

## 2018-09-28 RX ADMIN — HYDROMORPHONE HYDROCHLORIDE 4 MILLIGRAM(S): 2 INJECTION INTRAMUSCULAR; INTRAVENOUS; SUBCUTANEOUS at 22:53

## 2018-09-28 RX ADMIN — ENOXAPARIN SODIUM 30 MILLIGRAM(S): 100 INJECTION SUBCUTANEOUS at 05:04

## 2018-09-28 RX ADMIN — GABAPENTIN 100 MILLIGRAM(S): 400 CAPSULE ORAL at 05:04

## 2018-09-28 RX ADMIN — ENOXAPARIN SODIUM 30 MILLIGRAM(S): 100 INJECTION SUBCUTANEOUS at 17:18

## 2018-09-28 RX ADMIN — Medication 500 MILLIGRAM(S): at 05:04

## 2018-09-28 RX ADMIN — HYDROMORPHONE HYDROCHLORIDE 4 MILLIGRAM(S): 2 INJECTION INTRAMUSCULAR; INTRAVENOUS; SUBCUTANEOUS at 13:52

## 2018-09-28 RX ADMIN — HYDROMORPHONE HYDROCHLORIDE 4 MILLIGRAM(S): 2 INJECTION INTRAMUSCULAR; INTRAVENOUS; SUBCUTANEOUS at 17:53

## 2018-09-28 RX ADMIN — HYDROMORPHONE HYDROCHLORIDE 0.5 MILLIGRAM(S): 2 INJECTION INTRAMUSCULAR; INTRAVENOUS; SUBCUTANEOUS at 12:05

## 2018-09-28 RX ADMIN — NYSTATIN CREAM 1 APPLICATION(S): 100000 CREAM TOPICAL at 17:19

## 2018-09-28 RX ADMIN — LOSARTAN POTASSIUM 50 MILLIGRAM(S): 100 TABLET, FILM COATED ORAL at 05:04

## 2018-09-28 RX ADMIN — HYDROMORPHONE HYDROCHLORIDE 0.5 MILLIGRAM(S): 2 INJECTION INTRAMUSCULAR; INTRAVENOUS; SUBCUTANEOUS at 11:50

## 2018-09-28 NOTE — PROGRESS NOTE ADULT - SUBJECTIVE AND OBJECTIVE BOX
Plastic Surgery Progress Note    Subjective: Patient examined at bedside. Patient doing well, no complaints at this time. Patient had MRI, Lower extremity dopplers (demonstrating bilateral DVTs) and IVC filter placed.       Objective:  Vital Signs Last 24 Hrs  T(C): 36.7 (28 Sep 2018 04:45), Max: 37.6 (27 Sep 2018 20:00)  T(F): 98 (28 Sep 2018 04:45), Max: 99.6 (27 Sep 2018 20:00)  HR: 77 (28 Sep 2018 04:45) (72 - 88)  BP: 148/68 (28 Sep 2018 04:45) (137/78 - 159/73)  BP(mean): --  RR: 18 (28 Sep 2018 04:45) (18 - 68)  SpO2: 97% (28 Sep 2018 04:45) (86% - 100%)    I&O's Summary    27 Sep 2018 07:01  -  28 Sep 2018 07:00  --------------------------------------------------------  IN: 580 mL / OUT: 915 mL / NET: -335 mL          Exam:  General: NAD  Respiratory: Breathing comfortably  Cardiovascular: NSR  Back: soft, dressing in place c/d/i.  No collections, 1 NIMCO in place with serosanguinous output                          10.3   16.6  )-----------( 291      ( 27 Sep 2018 11:13 )             31.6       09-27    138  |  104  |  14  ----------------------------<  116<H>  4.5   |  26  |  0.63    Ca    8.9      27 Sep 2018 11:13  Phos  2.6     09-27  Mg     2.0     09-27      < from: [facility] Duplex Lower Ext Vein ScanImani (09.27.18 @ 12:08) >  EXAM:  DUPLEX SCAN EXT VEINS LOWER BI                            PROCEDURE DATE:  09/27/2018            INTERPRETATION:  CLINICAL INFORMATION: History of uterine cancer.   Bilateral lower extremity swelling.    COMPARISON: September 12, 2018.    TECHNIQUE: Duplex sonography of the BILATERAL LOWER extremities with   color and spectral Doppler, with and without compression.      FINDINGS:    There is normal compressibility of the right common femoral and femoral   veins.  There is heterogeneous thrombus noted within a distended right   popliteal, tibioperoneal trunk and right posterior tibial veins. The   right peroneal vein was not visualized.    Findings are consistent with acute DVT right lower extremity.    There is thrombus distending the left common femoral vein. This vein is   partially compressible. The left femoral and popliteal veins are patent   and compressible as is the left tibioperoneal trunk and left posterior   tibial vein. The left peroneal vein was not visualized.    Thick-walled cyst within the left popliteal fossa measuring 1.5 cm x 2.6   cm.    IMPRESSION:     Above the knee right-sided lower extremity acute DVT.    Above-the-knee left-sided lower extremity acute DVT.    Thick-walled left Limon's cyst.    MARLYS Almonte was notified of these findings 9/27/2018 at 12:00 PM.      < end of copied text >

## 2018-09-28 NOTE — PROGRESS NOTE ADULT - PROBLEM SELECTOR PLAN 7
Cellulitis of LLE diagnosed at [facility], resolved   Completed course of Ancef   Blood cultures NGTD  LE dopplers negative for DVT.

## 2018-09-28 NOTE — PROGRESS NOTE ADULT - ASSESSMENT
Assessment:  1.  Bilateral DVT  2.  Recent spinal surgery  3.  Malignancy  4.  HTN      Plan  1.  IVC filter successfully placed yesterday.    2.  Would perform 2d echocardiogram  3.  When safe from a neurosurgical perspective, would transition to full dose anticoagulation (Lovenox 1mg/kg BID)  4.  At some point in the future, once she recovers from surgery and can tolerate full dose anticoagulation, then we will retrieve the IVC filter  5.  Continue to monitor BP on losartan 50mg daily     Thanks    Zach Caldwell   Vascular Medicine [id number]

## 2018-09-28 NOTE — PROGRESS NOTE ADULT - SUBJECTIVE AND OBJECTIVE BOX
Carlos Machuca   Pager [phone]  Office [phone]      CC: Patient is a 73y old  Female who presents with a chief complaint of L2 lesion (28 Sep 2018 10:34)      SUBJECTIVE / OVERNIGHT EVENTS: No urinary freq, dysuria. No f/c/r. No CP/SOB    MEDICATIONS  (STANDING):  ciprofloxacin     Tablet 500 milliGRAM(s) Oral every 12 hours  dextrose 50% Injectable 12.5 Gram(s) IV Push once  dextrose 50% Injectable 25 Gram(s) IV Push once  dextrose 50% Injectable 25 Gram(s) IV Push once  enoxaparin Injectable 30 milliGRAM(s) SubCutaneous every 12 hours  gabapentin 100 milliGRAM(s) Oral three times a day  influenza   Vaccine 0.5 milliLiter(s) IntraMuscular once  insulin lispro (HumaLOG) corrective regimen sliding scale   SubCutaneous three times a day before meals  insulin lispro (HumaLOG) corrective regimen sliding scale   SubCutaneous at bedtime  losartan 50 milliGRAM(s) Oral daily  nystatin Powder 1 Application(s) Topical two times a day  sodium chloride 0.9% with potassium chloride 20 mEq/L 1000 milliLiter(s) (100 mL/Hr) IV Continuous <Continuous>    MEDICATIONS  (PRN):  acetaminophen   Tablet .. 650 milliGRAM(s) Oral every 6 hours PRN Temp greater or equal to 38C (100.4F), Mild Pain (1 - 3)  cyclobenzaprine 5 milliGRAM(s) Oral three times a day PRN Muscle Spasm  dextrose 40% Gel 15 Gram(s) Oral once PRN Blood Glucose LESS THAN 70 milliGRAM(s)/deciliter  diphenhydrAMINE   Capsule 25 milliGRAM(s) Oral every 24 hours PRN Insomnia  glucagon  Injectable 1 milliGRAM(s) IntraMuscular once PRN Glucose LESS THAN 70 milligrams/deciliter  HYDROmorphone   Tablet 2 milliGRAM(s) Oral every 4 hours PRN Moderate Pain (4 - 6)  HYDROmorphone   Tablet 4 milliGRAM(s) Oral every 4 hours PRN Severe Pain (7 - 10)  HYDROmorphone  Injectable 0.5 milliGRAM(s) IV Push every 3 hours PRN Breakthrough pain  naloxone Injectable 0.1 milliGRAM(s) IV Push every 3 minutes PRN For ANY of the following changes in patient status:  A. RR LESS THAN 10 breaths per minute, B. Oxygen saturation LESS THAN 90%, C. Sedation score of 6  ondansetron Injectable 4 milliGRAM(s) IV Push every 6 hours PRN Nausea and/or Vomiting      Vital Signs Last 24 Hrs  T(C): 36.4 (28 Sep 2018 13:27), Max: 37.6 (27 Sep 2018 20:00)  T(F): 97.6 (28 Sep 2018 13:27), Max: 99.6 (27 Sep 2018 20:00)  HR: 88 (28 Sep 2018 13:27) (72 - 88)  BP: 140/81 (28 Sep 2018 13:27) (137/78 - 159/73)  BP(mean): --  RR: 16 (28 Sep 2018 13:27) (16 - 18)  SpO2: 96% (28 Sep 2018 13:27) (86% - 100%)  CAPILLARY BLOOD GLUCOSE      POCT Blood Glucose.: 118 mg/dL (28 Sep 2018 11:42)  POCT Blood Glucose.: 97 mg/dL (28 Sep 2018 08:26)  POCT Blood Glucose.: 118 mg/dL (27 Sep 2018 21:56)  POCT Blood Glucose.: 93 mg/dL (27 Sep 2018 20:06)  POCT Blood Glucose.: 87 mg/dL (27 Sep 2018 17:34)    I&O's Summary    27 Sep 2018 07:01  -  28 Sep 2018 07:00  --------------------------------------------------------  IN: 580 mL / OUT: 915 mL / NET: -335 mL    28 Sep 2018 07:01  -  28 Sep 2018 14:45  --------------------------------------------------------  IN: 720 mL / OUT: 0 mL / NET: 720 mL          PHYSICAL EXAM:    GENERAL: NAD   HEENT: EOMI, PERRL  PULM: Clear to auscultation bilaterally  CV: Regular rate and rhythm; nl S1, S2; No murmurs, rubs, or gallops  ABDOMEN: Soft, Nontender, Nondistended; Bowel sounds present  EXTREMITIES/MSK:  Improved trace-1+ edema b/l LE  PSYCH: AAOx3  NEUROLOGY: 3+/5 prox LE; 5/5 distal          LABS:                        10.3   16.6  )-----------( 291      ( 27 Sep 2018 11:13 )             31.6     09-27    138  |  104  |  14  ----------------------------<  116<H>  4.5   |  26  |  0.63    Ca    8.9      27 Sep 2018 11:13  Phos  2.6     09-27  Mg     2.0     09-27                  RADIOLOGY & ADDITIONAL TESTS:    Imaging Personally Reviewed: venous dopplers    Consultant(s) Notes Reviewed:      Care Discussed with Consultants/Other Providers: neurosurg/cards regarding DVT

## 2018-09-28 NOTE — PROGRESS NOTE ADULT - SUBJECTIVE AND OBJECTIVE BOX
Vascular Cardiology      PAGER:  [phone]               S[facility]RA [id number]              EMAIL [email or web address]   OFFICE [phone]                              CC:  DVT    INTERVAL HISTORY:  No events.  She had IVC filter placed yesterday via R groin access.  No CP or SOB.    Allergies    No Known Allergies    Intolerances    	   MEDICATIONS  (STANDING):  ciprofloxacin     Tablet 500 milliGRAM(s) Oral every 12 hours  dextrose 50% Injectable 12.5 Gram(s) IV Push once  dextrose 50% Injectable 25 Gram(s) IV Push once  dextrose 50% Injectable 25 Gram(s) IV Push once  enoxaparin Injectable 30 milliGRAM(s) SubCutaneous every 12 hours  gabapentin 100 milliGRAM(s) Oral three times a day  influenza   Vaccine 0.5 milliLiter(s) IntraMuscular once  insulin lispro (HumaLOG) corrective regimen sliding scale   SubCutaneous three times a day before meals  insulin lispro (HumaLOG) corrective regimen sliding scale   SubCutaneous at bedtime  losartan 50 milliGRAM(s) Oral daily  nystatin Powder 1 Application(s) Topical two times a day  sodium chloride 0.9% with potassium chloride 20 mEq/L 1000 milliLiter(s) (100 mL/Hr) IV Continuous <Continuous>    MEDICATIONS  (PRN):  acetaminophen   Tablet .. 650 milliGRAM(s) Oral every 6 hours PRN Temp greater or equal to 38C (100.4F), Mild Pain (1 - 3)  cyclobenzaprine 5 milliGRAM(s) Oral three times a day PRN Muscle Spasm  dextrose 40% Gel 15 Gram(s) Oral once PRN Blood Glucose LESS THAN 70 milliGRAM(s)/deciliter  diphenhydrAMINE   Capsule 25 milliGRAM(s) Oral every 24 hours PRN Insomnia  glucagon  Injectable 1 milliGRAM(s) IntraMuscular once PRN Glucose LESS THAN 70 milligrams/deciliter  HYDROmorphone   Tablet 2 milliGRAM(s) Oral every 4 hours PRN Moderate Pain (4 - 6)  HYDROmorphone   Tablet 4 milliGRAM(s) Oral every 4 hours PRN Severe Pain (7 - 10)  HYDROmorphone  Injectable 0.5 milliGRAM(s) IV Push every 3 hours PRN Breakthrough pain  naloxone Injectable 0.1 milliGRAM(s) IV Push every 3 minutes PRN For ANY of the following changes in patient status:  A. RR LESS THAN 10 breaths per minute, B. Oxygen saturation LESS THAN 90%, C. Sedation score of 6  ondansetron Injectable 4 milliGRAM(s) IV Push every 6 hours PRN Nausea and/or Vomiting      PAST MEDICAL & SURGICAL HISTORY:  Lumbar spinal stenosis  Prediabetes  Essential (primary) hypertension  S/P right knee arthroscopy  S/P dilation and curettage: Early 2000&#x27;s for DUB, findings benign.  S/P cholecystectomy      FAMILY HISTORY:  Family history of acute myocardial infarction (Mother): mother  Family history of diabetes mellitus (Mother, Sibling)      SOCIAL HISTORY:  unchanged    REVIEW OF SYSTEMS:  CONSTITUTIONAL: No fever, weight loss, or fatigue  EYES: No eye pain, visual disturbances, or discharge  ENMT:  No difficulty hearing, tinnitus, vertigo; No sinus or throat pain  NECK: No pain or stiffness  RESPIRATORY: No cough, wheezing, chills or hemoptysis; No Shortness of Breath  CARDIOVASCULAR: No chest pain, palpitations, passing out, dizziness,  +leg swelling  GASTROINTESTINAL: No abdominal or epigastric pain. No nausea, vomiting, or hematemesis; No diarrhea or constipation. No melena or hematochezia.  GENITOURINARY: No dysuria, frequency, hematuria, or incontinence  NEUROLOGICAL: No headaches, memory loss, loss of strength, numbness, or tremors  SKIN: No itching, burning, rashes, or lesions   LYMPH Nodes: No enlarged glands  ENDOCRINE: No heat or cold intolerance; No hair loss  MUSCULOSKELETAL: No joint pain or swelling; No muscle, back, or extremity pain  PSYCHIATRIC: No depression, anxiety, mood swings, or difficulty sleeping  HEME/LYMPH: No easy bruising, or bleeding gums  ALLERY AND IMMUNOLOGIC: No hives or eczema	    [ x] All others negative	  [ ] Unable to obtain     ICU Vital Signs Last 24 Hrs  T(C): 36.7 (28 Sep 2018 08:43), Max: 37.6 (27 Sep 2018 20:00)  T(F): 98 (28 Sep 2018 08:43), Max: 99.6 (27 Sep 2018 20:00)  HR: 87 (28 Sep 2018 08:43) (72 - 88)  BP: 143/77 (28 Sep 2018 08:43) (137/78 - 159/73)  BP(mean): --  ABP: --  ABP(mean): --  RR: 18 (28 Sep 2018 08:43) (18 - 68)  SpO2: 96% (28 Sep 2018 08:43) (86% - 100%)        Appearance: Normal	, overweight  HEENT:   Normal oral mucosa, PERRL, EOMI	  Lymphatic: No lymphadenopathy  Cardiovascular: Normal S1 S2, No JVD, No murmurs, No edema  Respiratory: Lungs clear to auscultation	  Psychiatry: A & O x 3, Mood & affect appropriate  Gastrointestinal:  Soft, Non-tender, + BS	  Skin: No rashes, No ecchymoses, No cyanosis	  Neurologic: Non-focal  Extremities: Normal range of motion, No clubbing, cyanosis or edema  Vascular: Peripheral pulses palpable 2+ bilaterally      < from: Cardiac Cath Lab - Adult (09.27.18 @ 15:23) >  INTERVENTIONAL RECOMMENDATIONS: Peripheral Diagnostic Summary:  -patent IVC and bilateral renal veins.  -patent right common iliac vein.  Peripheral Interventional Summary:  -Successful IVC filter placement (Celect-Cook).  Plan  -retrieve IVC filter once safely on antiocoagulation.  -FU with Dr. Zach Caldwell in 3-4 weeks.    < end of copied text >  < from: Cardiac Cath Lab - Adult (09.27.18 @ 15:23) >  INTERVENTIONAL RECOMMENDATIONS: Peripheral Diagnostic Summary:  -patent IVC and bilateral renal veins.  -patent right common iliac vein.  Peripheral Interventional Summary:  -Successful IVC filter placement (Celect-Cook).  Plan  -retrieve IVC filter once safely on antiocoagulation.  -FU with Dr. Zach Caldwell in 3-4 weeks.    < end of copied text >

## 2018-09-28 NOTE — PROGRESS NOTE ADULT - PROBLEM SELECTOR PROBLEM 2
Essential (primary) hypertension
Neoplasm
Neoplasm
Edema
Essential (primary) hypertension
Neoplasm
UTI (urinary tract infection)
Essential (primary) hypertension
Essential (primary) hypertension

## 2018-09-28 NOTE — PROGRESS NOTE ADULT - SUBJECTIVE AND OBJECTIVE BOX
HPI:  Patient is a 73 year old female with chronic back pain which progressively worsened.  Began to develop parasthesias to the leg thigh as well as left leg weakness.  MRI was done which showed infiltrative process at L@ vertebral body.  CT scan then done which showed possible uterine cancer with mets.  Now presented to ED for increased severity of pain.    OVERNIGHT EVENTS:  Duplex of LE b/l revealed new DVTs yesterday and patient was taken for IVC filter with no complications.  Feels well today, having some incisional pain which is controlled on current regimen.  Tolerating diet.  Has been working with physical therapy.    Vital Signs Last 24 Hrs  T(C): 36.7 (28 Sep 2018 08:43), Max: 37.6 (27 Sep 2018 20:00)  T(F): 98 (28 Sep 2018 08:43), Max: 99.6 (27 Sep 2018 20:00)  HR: 87 (28 Sep 2018 08:43) (72 - 88)  BP: 143/77 (28 Sep 2018 08:43) (137/78 - 159/73)  BP(mean): --  RR: 18 (28 Sep 2018 08:43) (18 - 18)  SpO2: 96% (28 Sep 2018 08:43) (86% - 100%)      PHYSICAL EXAM:  Neurological: awake, alert, oriented x3, follows commands, speech clear and fluent, moves UE 5/5 strength throughout b/l  LLE: 3/5 HF, 4/5 KF/KE, 5/5 DF/PF  RLE: 4/5 HF/KF/KE, 5/5 DF/PF  sensation present, intact, equal throughout    Cardiovascular: +s1, s2  Respiratory: clear to auscultation b/l  Gastrointestinal: soft, non-distended, non-tender  Genitourinary: +voiding  Extremities: +dp/pt pulses palpable b/l  Incision/Wound: posterior midline vertical incision dressing c/d/i w/ aquacel, kori x1    TUBES/LINES:  [x] KORI x1 (40cc serosanguinous per 24 hours)    DIET:  [x] consistent carbohydrate    LABS:                        10.3   16.6  )-----------( 291      ( 27 Sep 2018 11:13 )             31.6     09-27    138  |  104  |  14  ----------------------------<  116<H>  4.5   |  26  |  0.63    Ca    8.9      27 Sep 2018 11:13  Phos  2.6     09-27  Mg     2.0     09-27        CAPILLARY BLOOD GLUCOSE      POCT Blood Glucose.: 97 mg/dL (28 Sep 2018 08:26)  POCT Blood Glucose.: 118 mg/dL (27 Sep 2018 21:56)  POCT Blood Glucose.: 93 mg/dL (27 Sep 2018 20:06)  POCT Blood Glucose.: 87 mg/dL (27 Sep 2018 17:34)  POCT Blood Glucose.: 119 mg/dL (27 Sep 2018 13:35)        Allergies    No Known Allergies        MEDICATIONS:  Antibiotics:  ciprofloxacin     Tablet 500 milliGRAM(s) Oral every 12 hours    Neuro:  acetaminophen   Tablet .. 650 milliGRAM(s) Oral every 6 hours PRN  cyclobenzaprine 5 milliGRAM(s) Oral three times a day PRN  diphenhydrAMINE   Capsule 25 milliGRAM(s) Oral every 24 hours PRN  gabapentin 100 milliGRAM(s) Oral three times a day  HYDROmorphone   Tablet 2 milliGRAM(s) Oral every 4 hours PRN  HYDROmorphone   Tablet 4 milliGRAM(s) Oral every 4 hours PRN  HYDROmorphone  Injectable 0.5 milliGRAM(s) IV Push every 3 hours PRN  ondansetron Injectable 4 milliGRAM(s) IV Push every 6 hours PRN    Anticoagulation:  enoxaparin Injectable 30 milliGRAM(s) SubCutaneous every 12 hours    OTHER:  dextrose 40% Gel 15 Gram(s) Oral once PRN  dextrose 50% Injectable 12.5 Gram(s) IV Push once  dextrose 50% Injectable 25 Gram(s) IV Push once  dextrose 50% Injectable 25 Gram(s) IV Push once  glucagon  Injectable 1 milliGRAM(s) IntraMuscular once PRN  influenza   Vaccine 0.5 milliLiter(s) IntraMuscular once  insulin lispro (HumaLOG) corrective regimen sliding scale   SubCutaneous three times a day before meals  insulin lispro (HumaLOG) corrective regimen sliding scale   SubCutaneous at bedtime  losartan 50 milliGRAM(s) Oral daily  naloxone Injectable 0.1 milliGRAM(s) IV Push every 3 minutes PRN  nystatin Powder 1 Application(s) Topical two times a day    IVF:  sodium chloride 0.9% with potassium chloride 20 mEq/L 1000 milliLiter(s) IV Continuous <Continuous>    CULTURES:  Culture Results:   >100,000 CFU/ml Citrobacter species (09-25 @ 07:25)    RADIOLOGY & ADDITIONAL TESTS:  none HPI:  Patient is a 73 year old female with chronic back pain which progressively worsened.  Began to develop parasthesias to the leg thigh as well as left leg weakness.  MRI was done which showed infiltrative process at L@ vertebral body.  CT scan then done which showed possible uterine cancer with mets.  Now presented to ED for increased severity of pain.    OVERNIGHT EVENTS:  Duplex of LE b/l revealed new DVTs yesterday and patient was taken for IVC filter with no complications.  Feels well today, having some incisional pain which is controlled on current regimen.  Tolerating diet.  Has been working with physical therapy.    Vital Signs Last 24 Hrs  T(C): 36.7 (28 Sep 2018 08:43), Max: 37.6 (27 Sep 2018 20:00)  T(F): 98 (28 Sep 2018 08:43), Max: 99.6 (27 Sep 2018 20:00)  HR: 87 (28 Sep 2018 08:43) (72 - 88)  BP: 143/77 (28 Sep 2018 08:43) (137/78 - 159/73)  BP(mean): --  RR: 18 (28 Sep 2018 08:43) (18 - 18)  SpO2: 96% (28 Sep 2018 08:43) (86% - 100%)      PHYSICAL EXAM:  Neurological: awake, alert, oriented x3, follows commands, speech clear and fluent, moves UE 5/5 strength throughout b/l  LLE: 3/5 HF, 4/5 KF/KE, 5/5 DF/PF  RLE: 4/5 HF/KF/KE, 5/5 DF/PF  sensation present, intact, equal throughout    Cardiovascular: +s1, s2  Respiratory: clear to auscultation b/l  Gastrointestinal: soft, non-distended, non-tender  Genitourinary: +voiding  Extremities: +dp/pt pulses palpable b/l  Incision/Wound (TIFFANY Lomax present as chaperone): posterior midline vertical incision dressing c/d/i w/ aquacel, kori x1    TUBES/LINES:  [x] KORI x1 (40cc serosanguinous per 24 hours)    DIET:  [x] consistent carbohydrate    LABS:                        10.3   16.6  )-----------( 291      ( 27 Sep 2018 11:13 )             31.6     09-27    138  |  104  |  14  ----------------------------<  116<H>  4.5   |  26  |  0.63    Ca    8.9      27 Sep 2018 11:13  Phos  2.6     09-27  Mg     2.0     09-27        CAPILLARY BLOOD GLUCOSE      POCT Blood Glucose.: 97 mg/dL (28 Sep 2018 08:26)  POCT Blood Glucose.: 118 mg/dL (27 Sep 2018 21:56)  POCT Blood Glucose.: 93 mg/dL (27 Sep 2018 20:06)  POCT Blood Glucose.: 87 mg/dL (27 Sep 2018 17:34)  POCT Blood Glucose.: 119 mg/dL (27 Sep 2018 13:35)        Allergies    No Known Allergies        MEDICATIONS:  Antibiotics:  ciprofloxacin     Tablet 500 milliGRAM(s) Oral every 12 hours    Neuro:  acetaminophen   Tablet .. 650 milliGRAM(s) Oral every 6 hours PRN  cyclobenzaprine 5 milliGRAM(s) Oral three times a day PRN  diphenhydrAMINE   Capsule 25 milliGRAM(s) Oral every 24 hours PRN  gabapentin 100 milliGRAM(s) Oral three times a day  HYDROmorphone   Tablet 2 milliGRAM(s) Oral every 4 hours PRN  HYDROmorphone   Tablet 4 milliGRAM(s) Oral every 4 hours PRN  HYDROmorphone  Injectable 0.5 milliGRAM(s) IV Push every 3 hours PRN  ondansetron Injectable 4 milliGRAM(s) IV Push every 6 hours PRN    Anticoagulation:  enoxaparin Injectable 30 milliGRAM(s) SubCutaneous every 12 hours    OTHER:  dextrose 40% Gel 15 Gram(s) Oral once PRN  dextrose 50% Injectable 12.5 Gram(s) IV Push once  dextrose 50% Injectable 25 Gram(s) IV Push once  dextrose 50% Injectable 25 Gram(s) IV Push once  glucagon  Injectable 1 milliGRAM(s) IntraMuscular once PRN  influenza   Vaccine 0.5 milliLiter(s) IntraMuscular once  insulin lispro (HumaLOG) corrective regimen sliding scale   SubCutaneous three times a day before meals  insulin lispro (HumaLOG) corrective regimen sliding scale   SubCutaneous at bedtime  losartan 50 milliGRAM(s) Oral daily  naloxone Injectable 0.1 milliGRAM(s) IV Push every 3 minutes PRN  nystatin Powder 1 Application(s) Topical two times a day    IVF:  sodium chloride 0.9% with potassium chloride 20 mEq/L 1000 milliLiter(s) IV Continuous <Continuous>    CULTURES:  Culture Results:   >100,000 CFU/ml Citrobacter species (09-25 @ 07:25)    RADIOLOGY & ADDITIONAL TESTS:  none

## 2018-09-28 NOTE — PROGRESS NOTE ADULT - ASSESSMENT
HPI:  Patient is a 73 year old female with chronic back pain which progressively worsened.  Began to develop parasthesias to the leg thigh as well as left leg weakness.  MRI was done which showed infiltrative process at L@ vertebral body.  CT scan then done which showed possible uterine cancer with mets.  Now presented to ED for increased severity of pain.    PROCEDURE: s/p angiogram and embolization of L2 lesion on 9/17/2018, now s/p L2 corpectomy and T11-L4 posterior instrumentation and fusion on 9/18/2018, now s/p IVC filter placement on 9/27/2018  PAD#11, POD#10, post IVC filter day#1    PLAN:  -lovenox and SCDs for prophylaxis, can start full dose anticoagulation on post op day #14 (10/1/2018) as per Dr. Caceres  -dilaudid and gabapentin for pain control  -flexeril for muscle spasms  -HISS for glucose control  -cipro for uti  -consistent carbohydrate diet  -TTE pending  -out of bed with assistance  -incentive spirometer for lung expansion  -continue kori, monitor output, possible d/c today  -pt/ot - acute rehab upon discharge  -am labs    Spectra #[id number]

## 2018-09-28 NOTE — PROGRESS NOTE ADULT - ASSESSMENT
73F hx HTN, prediabetes, spinal stenosis 2/2 metastatic lesion now POD#10 s/p T12-L4 fusion and plastics closure (9/18)    - Next aquacel change 10/2  - Remove R drain today  - Rest of care per primary team    Plastic Surgery  [phone]

## 2018-09-29 LAB
ANION GAP SERPL CALC-SCNC: 10 MMOL/L — SIGNIFICANT CHANGE UP (ref 5–17)
BUN SERPL-MCNC: 20 MG/DL — SIGNIFICANT CHANGE UP (ref 7–23)
CALCIUM SERPL-MCNC: 8.9 MG/DL — SIGNIFICANT CHANGE UP (ref 8.4–10.5)
CHLORIDE SERPL-SCNC: 101 MMOL/L — SIGNIFICANT CHANGE UP (ref 96–108)
CO2 SERPL-SCNC: 25 MMOL/L — SIGNIFICANT CHANGE UP (ref 22–31)
CREAT SERPL-MCNC: 0.75 MG/DL — SIGNIFICANT CHANGE UP (ref 0.5–1.3)
GLUCOSE BLDC GLUCOMTR-MCNC: 120 MG/DL — HIGH (ref 70–99)
GLUCOSE BLDC GLUCOMTR-MCNC: 137 MG/DL — HIGH (ref 70–99)
GLUCOSE BLDC GLUCOMTR-MCNC: 163 MG/DL — HIGH (ref 70–99)
GLUCOSE BLDC GLUCOMTR-MCNC: 167 MG/DL — HIGH (ref 70–99)
GLUCOSE SERPL-MCNC: 138 MG/DL — HIGH (ref 70–99)
HCT VFR BLD CALC: 29.2 % — LOW (ref 34.5–45)
HGB BLD-MCNC: 9.4 G/DL — LOW (ref 11.5–15.5)
MCHC RBC-ENTMCNC: 29 PG — SIGNIFICANT CHANGE UP (ref 27–34)
MCHC RBC-ENTMCNC: 32.3 GM/DL — SIGNIFICANT CHANGE UP (ref 32–36)
MCV RBC AUTO: 89.7 FL — SIGNIFICANT CHANGE UP (ref 80–100)
PLATELET # BLD AUTO: 231 K/UL — SIGNIFICANT CHANGE UP (ref 150–400)
POTASSIUM SERPL-MCNC: 3.7 MMOL/L — SIGNIFICANT CHANGE UP (ref 3.5–5.3)
POTASSIUM SERPL-SCNC: 3.7 MMOL/L — SIGNIFICANT CHANGE UP (ref 3.5–5.3)
RBC # BLD: 3.25 M/UL — LOW (ref 3.8–5.2)
RBC # FLD: 15.4 % — HIGH (ref 10.3–14.5)
SODIUM SERPL-SCNC: 136 MMOL/L — SIGNIFICANT CHANGE UP (ref 135–145)
WBC # BLD: 16.1 K/UL — HIGH (ref 3.8–10.5)
WBC # FLD AUTO: 16.1 K/UL — HIGH (ref 3.8–10.5)

## 2018-09-29 PROCEDURE — 99232 SBSQ HOSP IP/OBS MODERATE 35: CPT

## 2018-09-29 RX ADMIN — CYCLOBENZAPRINE HYDROCHLORIDE 5 MILLIGRAM(S): 10 TABLET, FILM COATED ORAL at 12:30

## 2018-09-29 RX ADMIN — NYSTATIN CREAM 1 APPLICATION(S): 100000 CREAM TOPICAL at 18:32

## 2018-09-29 RX ADMIN — Medication 1: at 18:32

## 2018-09-29 RX ADMIN — HYDROMORPHONE HYDROCHLORIDE 4 MILLIGRAM(S): 2 INJECTION INTRAMUSCULAR; INTRAVENOUS; SUBCUTANEOUS at 18:32

## 2018-09-29 RX ADMIN — HYDROMORPHONE HYDROCHLORIDE 4 MILLIGRAM(S): 2 INJECTION INTRAMUSCULAR; INTRAVENOUS; SUBCUTANEOUS at 04:53

## 2018-09-29 RX ADMIN — HYDROMORPHONE HYDROCHLORIDE 0.5 MILLIGRAM(S): 2 INJECTION INTRAMUSCULAR; INTRAVENOUS; SUBCUTANEOUS at 00:55

## 2018-09-29 RX ADMIN — HYDROMORPHONE HYDROCHLORIDE 4 MILLIGRAM(S): 2 INJECTION INTRAMUSCULAR; INTRAVENOUS; SUBCUTANEOUS at 09:37

## 2018-09-29 RX ADMIN — ENOXAPARIN SODIUM 30 MILLIGRAM(S): 100 INJECTION SUBCUTANEOUS at 05:45

## 2018-09-29 RX ADMIN — Medication 500 MILLIGRAM(S): at 05:45

## 2018-09-29 RX ADMIN — HYDROMORPHONE HYDROCHLORIDE 4 MILLIGRAM(S): 2 INJECTION INTRAMUSCULAR; INTRAVENOUS; SUBCUTANEOUS at 19:02

## 2018-09-29 RX ADMIN — HYDROMORPHONE HYDROCHLORIDE 4 MILLIGRAM(S): 2 INJECTION INTRAMUSCULAR; INTRAVENOUS; SUBCUTANEOUS at 05:23

## 2018-09-29 RX ADMIN — ENOXAPARIN SODIUM 30 MILLIGRAM(S): 100 INJECTION SUBCUTANEOUS at 18:32

## 2018-09-29 RX ADMIN — HYDROMORPHONE HYDROCHLORIDE 0.5 MILLIGRAM(S): 2 INJECTION INTRAMUSCULAR; INTRAVENOUS; SUBCUTANEOUS at 00:40

## 2018-09-29 RX ADMIN — HYDROMORPHONE HYDROCHLORIDE 4 MILLIGRAM(S): 2 INJECTION INTRAMUSCULAR; INTRAVENOUS; SUBCUTANEOUS at 23:30

## 2018-09-29 RX ADMIN — LOSARTAN POTASSIUM 50 MILLIGRAM(S): 100 TABLET, FILM COATED ORAL at 05:45

## 2018-09-29 RX ADMIN — HYDROMORPHONE HYDROCHLORIDE 4 MILLIGRAM(S): 2 INJECTION INTRAMUSCULAR; INTRAVENOUS; SUBCUTANEOUS at 22:34

## 2018-09-29 RX ADMIN — HYDROMORPHONE HYDROCHLORIDE 4 MILLIGRAM(S): 2 INJECTION INTRAMUSCULAR; INTRAVENOUS; SUBCUTANEOUS at 14:40

## 2018-09-29 RX ADMIN — Medication 500 MILLIGRAM(S): at 18:32

## 2018-09-29 RX ADMIN — HYDROMORPHONE HYDROCHLORIDE 4 MILLIGRAM(S): 2 INJECTION INTRAMUSCULAR; INTRAVENOUS; SUBCUTANEOUS at 14:10

## 2018-09-29 RX ADMIN — GABAPENTIN 100 MILLIGRAM(S): 400 CAPSULE ORAL at 05:46

## 2018-09-29 RX ADMIN — GABAPENTIN 100 MILLIGRAM(S): 400 CAPSULE ORAL at 21:33

## 2018-09-29 RX ADMIN — GABAPENTIN 100 MILLIGRAM(S): 400 CAPSULE ORAL at 14:10

## 2018-09-29 RX ADMIN — NYSTATIN CREAM 1 APPLICATION(S): 100000 CREAM TOPICAL at 05:52

## 2018-09-29 RX ADMIN — HYDROMORPHONE HYDROCHLORIDE 4 MILLIGRAM(S): 2 INJECTION INTRAMUSCULAR; INTRAVENOUS; SUBCUTANEOUS at 10:07

## 2018-09-29 NOTE — PROGRESS NOTE ADULT - ASSESSMENT
Assessment:  1.  Bilateral DVT  2.  Recent spinal surgery  3.  Malignancy  4.  HTN      Plan  1.  IVC filter successfully placed  .    2.  Please obtain 2d echocardiogram  3.  When safe from a neurosurgical perspective, would transition to full dose anticoagulation (Lovenox 1mg/kg BID)  4.  At some point in the future, once she recovers from surgery and can tolerate full dose anticoagulation, then we will retrieve the IVC filter  5.  Continue to monitor BP on losartan 50mg daily     Thanks    Zach Caldwell   Vascular Medicine [id number]

## 2018-09-29 NOTE — PROGRESS NOTE ADULT - ASSESSMENT
HPI:  Patient is a 73 year old female with chronic back pain which progressively worsened.  Began to develop parasthesias to the leg thigh as well as left leg weakness.  MRI was done which showed infiltrative process at L@ vertebral body.  CT scan then done which showed possible uterine cancer with mets.  Now presented to ED for increased severity of pain.    PROCEDURE: s/p angiogram and embolization of L2 lesion on 9/17/2018, now s/p L2 corpectomy and T11-L4 posterior instrumentation and fusion on 9/18/2018, now s/p IVC filter placement on 9/27/2018  PAD#12, POD#11, post IVC filter day#2    PLAN:  -lovenox and SCDs for prophylaxis, can start full dose anticoagulation on post op day #14 (10/1/2018) as per Dr. Caceres  -dilaudid and gabapentin for pain control  -flexeril for muscle spasms  -HISS for glucose control  -cipro for uti  -consistent carbohydrate diet  -TTE pending  -out of bed with assistance  -incentive spirometer for lung expansion  -pt/ot - acute rehab upon discharge  -am labs    Spectra #[id number]

## 2018-09-29 NOTE — PROGRESS NOTE ADULT - SUBJECTIVE AND OBJECTIVE BOX
HPI:  Patient is a 73 year old female with chronic back pain which progressively worsened.  Began to develop parasthesias to the leg thigh as well as left leg weakness.  MRI was done which showed infiltrative process at L@ vertebral body.  CT scan then done which showed possible uterine cancer with mets.  Now presented to ED for increased severity of pain.    OVERNIGHT EVENTS:  Duplex of LE b/l revealed new DVTs yesterday and patient was taken for IVC filter with no complications.  Feels well today, having some incisional pain which is controlled on current regimen.  Tolerating diet.  Has been working with physical therapy.    Vital Signs Last 24 Hrs  T(C): 36.8 (29 Sep 2018 14:16), Max: 36.8 (29 Sep 2018 08:36)  T(F): 98.2 (29 Sep 2018 14:16), Max: 98.3 (29 Sep 2018 08:36)  HR: 78 (29 Sep 2018 14:16) (75 - 90)  BP: 116/69 (29 Sep 2018 14:16) (103/64 - 129/67)  RR: 17 (29 Sep 2018 14:16) (17 - 18)  SpO2: 95% (29 Sep 2018 14:16) (95% - 99%)        PHYSICAL EXAM:  Neurological: awake, alert, oriented x3, follows commands, speech clear and fluent, moves UE 5/5 strength throughout b/l  LLE: 3/5 HF, 4/5 KF/KE, 5/5 DF/PF  RLE: 4/5 HF/KF/KE, 5/5 DF/PF  sensation present, intact, equal throughout    Cardiovascular: +s1, s2  Respiratory: clear to auscultation b/l  Gastrointestinal: soft, non-distended, non-tender  Genitourinary: +voiding  Extremities: +dp/pt pulses palpable b/l  Incision/Wound (TIFFANY Lomax present as chaperone): posterior midline vertical incision dressing c/d/i w/ aquacel, kori x1    TUBES/LINES:  [x] KORI x1 (40cc serosanguinous per 24 hours)    DIET:  [x] consistent carbohydrate  09-29    136  |  101  |  20  ----------------------------<  138<H>  3.7   |  25  |  0.75    Ca    8.9      29 Sep 2018 10:20    MEDICATIONS  (STANDING):  ciprofloxacin     Tablet 500 milliGRAM(s) Oral every 12 hours  dextrose 50% Injectable 12.5 Gram(s) IV Push once  dextrose 50% Injectable 25 Gram(s) IV Push once  dextrose 50% Injectable 25 Gram(s) IV Push once  enoxaparin Injectable 30 milliGRAM(s) SubCutaneous every 12 hours  gabapentin 100 milliGRAM(s) Oral three times a day  influenza   Vaccine 0.5 milliLiter(s) IntraMuscular once  insulin lispro (HumaLOG) corrective regimen sliding scale   SubCutaneous three times a day before meals  insulin lispro (HumaLOG) corrective regimen sliding scale   SubCutaneous at bedtime  losartan 50 milliGRAM(s) Oral daily  nystatin Powder 1 Application(s) Topical two times a day  sodium chloride 0.9% with potassium chloride 20 mEq/L 1000 milliLiter(s) (100 mL/Hr) IV Continuous <Continuous>    MEDICATIONS  (PRN):  acetaminophen   Tablet .. 650 milliGRAM(s) Oral every 6 hours PRN Temp greater or equal to 38C (100.4F), Mild Pain (1 - 3)  cyclobenzaprine 5 milliGRAM(s) Oral three times a day PRN Muscle Spasm  dextrose 40% Gel 15 Gram(s) Oral once PRN Blood Glucose LESS THAN 70 milliGRAM(s)/deciliter  diphenhydrAMINE   Capsule 25 milliGRAM(s) Oral every 24 hours PRN Insomnia  glucagon  Injectable 1 milliGRAM(s) IntraMuscular once PRN Glucose LESS THAN 70 milligrams/deciliter  HYDROmorphone   Tablet 2 milliGRAM(s) Oral every 4 hours PRN Moderate Pain (4 - 6)  HYDROmorphone   Tablet 4 milliGRAM(s) Oral every 4 hours PRN Severe Pain (7 - 10)  HYDROmorphone  Injectable 0.5 milliGRAM(s) IV Push every 3 hours PRN Breakthrough pain  naloxone Injectable 0.1 milliGRAM(s) IV Push every 3 minutes PRN For ANY of the following changes in patient status:  A. RR LESS THAN 10 breaths per minute, B. Oxygen saturation LESS THAN 90%, C. Sedation score of 6  ondansetron Injectable 4 milliGRAM(s) IV Push every 6 hours PRN Nausea and/or Vomiting    CULTURES:  Culture Results:   >100,000 CFU/ml Citrobacter species (09-25 @ 07:25)    RADIOLOGY & ADDITIONAL TESTS:  none

## 2018-09-29 NOTE — PROGRESS NOTE ADULT - SUBJECTIVE AND OBJECTIVE BOX
Vascular Cardiology      PAGER:  [phone]               [facility] [id number]              EMAIL [email or web address]   OFFICE [phone]                              CC:  DVT    INTERVAL HISTORY:  No events.   No CP or SOB.  Legs feel ok.      Allergies    No Known Allergies    Intolerances   MEDICATIONS  (STANDING):  ciprofloxacin     Tablet 500 milliGRAM(s) Oral every 12 hours  dextrose 50% Injectable 12.5 Gram(s) IV Push once  dextrose 50% Injectable 25 Gram(s) IV Push once  dextrose 50% Injectable 25 Gram(s) IV Push once  enoxaparin Injectable 30 milliGRAM(s) SubCutaneous every 12 hours  gabapentin 100 milliGRAM(s) Oral three times a day  influenza   Vaccine 0.5 milliLiter(s) IntraMuscular once  insulin lispro (HumaLOG) corrective regimen sliding scale   SubCutaneous three times a day before meals  insulin lispro (HumaLOG) corrective regimen sliding scale   SubCutaneous at bedtime  losartan 50 milliGRAM(s) Oral daily  nystatin Powder 1 Application(s) Topical two times a day  sodium chloride 0.9% with potassium chloride 20 mEq/L 1000 milliLiter(s) (100 mL/Hr) IV Continuous <Continuous>    MEDICATIONS  (PRN):  acetaminophen   Tablet .. 650 milliGRAM(s) Oral every 6 hours PRN Temp greater or equal to 38C (100.4F), Mild Pain (1 - 3)  cyclobenzaprine 5 milliGRAM(s) Oral three times a day PRN Muscle Spasm  dextrose 40% Gel 15 Gram(s) Oral once PRN Blood Glucose LESS THAN 70 milliGRAM(s)/deciliter  diphenhydrAMINE   Capsule 25 milliGRAM(s) Oral every 24 hours PRN Insomnia  glucagon  Injectable 1 milliGRAM(s) IntraMuscular once PRN Glucose LESS THAN 70 milligrams/deciliter  HYDROmorphone   Tablet 2 milliGRAM(s) Oral every 4 hours PRN Moderate Pain (4 - 6)  HYDROmorphone   Tablet 4 milliGRAM(s) Oral every 4 hours PRN Severe Pain (7 - 10)  HYDROmorphone  Injectable 0.5 milliGRAM(s) IV Push every 3 hours PRN Breakthrough pain  naloxone Injectable 0.1 milliGRAM(s) IV Push every 3 minutes PRN For ANY of the following changes in patient status:  A. RR LESS THAN 10 breaths per minute, B. Oxygen saturation LESS THAN 90%, C. Sedation score of 6  ondansetron Injectable 4 milliGRAM(s) IV Push every 6 hours PRN Nausea and/or Vomiting      PAST MEDICAL & SURGICAL HISTORY:  Lumbar spinal stenosis  Prediabetes  Essential (primary) hypertension  S/P right knee arthroscopy  S/P dilation and curettage: Early 2000&#x27;s for DUB, findings benign.  S/P cholecystectomy      FAMILY HISTORY:  Family history of acute myocardial infarction (Mother): mother  Family history of diabetes mellitus (Mother, Sibling)      SOCIAL HISTORY:  unchanged    REVIEW OF SYSTEMS:  CONSTITUTIONAL: No fever, weight loss, or fatigue  EYES: No eye pain, visual disturbances, or discharge  ENMT:  No difficulty hearing, tinnitus, vertigo; No sinus or throat pain  NECK: No pain or stiffness  RESPIRATORY: No cough, wheezing, chills or hemoptysis; No Shortness of Breath  CARDIOVASCULAR: No chest pain, palpitations, passing out, dizziness,  +leg swelling  GASTROINTESTINAL: No abdominal or epigastric pain. No nausea, vomiting, or hematemesis; No diarrhea or constipation. No melena or hematochezia.  GENITOURINARY: No dysuria, frequency, hematuria, or incontinence  NEUROLOGICAL: No headaches, memory loss, loss of strength, numbness, or tremors  SKIN: No itching, burning, rashes, or lesions   LYMPH Nodes: No enlarged glands  ENDOCRINE: No heat or cold intolerance; No hair loss  MUSCULOSKELETAL: No joint pain or swelling; No muscle, back, or extremity pain  PSYCHIATRIC: No depression, anxiety, mood swings, or difficulty sleeping  HEME/LYMPH: No easy bruising, or bleeding gums  ALLERY AND IMMUNOLOGIC: No hives or eczema	    [ x] All others negative	  [ ] Unable to obtain     ICU Vital Signs Last 24 Hrs  T(C): 36.7 (29 Sep 2018 16:57), Max: 36.8 (29 Sep 2018 08:36)  T(F): 98 (29 Sep 2018 16:57), Max: 98.3 (29 Sep 2018 08:36)  HR: 89 (29 Sep 2018 16:57) (75 - 90)  BP: 105/52 (29 Sep 2018 16:57) (103/64 - 129/67)  BP(mean): --  ABP: --  ABP(mean): --  RR: 18 (29 Sep 2018 16:57) (17 - 18)  SpO2: 98% (29 Sep 2018 16:57) (95% - 99%)          Appearance: Normal	, overweight  HEENT:   Normal oral mucosa, PERRL, EOMI	  Lymphatic: No lymphadenopathy  Cardiovascular: Normal S1 S2, No JVD, No murmurs, No edema  Respiratory: Lungs clear to auscultation	  Psychiatry: A & O x 3, Mood & affect appropriate  Gastrointestinal:  Soft, Non-tender, + BS	  Skin: No rashes, No ecchymoses, No cyanosis	  Neurologic: Non-focal  Extremities: Normal range of motion, No clubbing, cyanosis or edema  Vascular: Peripheral pulses palpable 2+ bilaterally      < from: Cardiac Cath Lab - Adult (09.27.18 @ 15:23) >  INTERVENTIONAL RECOMMENDATIONS: Peripheral Diagnostic Summary:  -patent IVC and bilateral renal veins.  -patent right common iliac vein.  Peripheral Interventional Summary:  -Successful IVC filter placement (Celect-Cook).  Plan  -retrieve IVC filter once safely on antiocoagulation.  -FU with Dr. Zach Caldwell in 3-4 weeks.    < end of copied text >  < from: Cardiac Cath Lab - Adult (09.27.18 @ 15:23) >  INTERVENTIONAL RECOMMENDATIONS: Peripheral Diagnostic Summary:  -patent IVC and bilateral renal veins.  -patent right common iliac vein.  Peripheral Interventional Summary:  -Successful IVC filter placement (Celect-Cook).  Plan  -retrieve IVC filter once safely on antiocoagulation.  -FU with Dr. Zach Caldwell in 3-4 weeks.      < end of copied text >

## 2018-09-30 LAB
GLUCOSE BLDC GLUCOMTR-MCNC: 113 MG/DL — HIGH (ref 70–99)
GLUCOSE BLDC GLUCOMTR-MCNC: 144 MG/DL — HIGH (ref 70–99)
GLUCOSE BLDC GLUCOMTR-MCNC: 151 MG/DL — HIGH (ref 70–99)
GLUCOSE BLDC GLUCOMTR-MCNC: 166 MG/DL — HIGH (ref 70–99)

## 2018-09-30 RX ORDER — DEXTROSE MONOHYDRATE, SODIUM CHLORIDE, AND POTASSIUM CHLORIDE 50; .745; 4.5 G/1000ML; G/1000ML; G/1000ML
1000 INJECTION, SOLUTION INTRAVENOUS
Qty: 0 | Refills: 0 | Status: DISCONTINUED | OUTPATIENT
Start: 2018-09-30 | End: 2018-10-01

## 2018-09-30 RX ADMIN — GABAPENTIN 100 MILLIGRAM(S): 400 CAPSULE ORAL at 05:00

## 2018-09-30 RX ADMIN — Medication 25 MILLIGRAM(S): at 22:00

## 2018-09-30 RX ADMIN — DEXTROSE MONOHYDRATE, SODIUM CHLORIDE, AND POTASSIUM CHLORIDE 50 MILLILITER(S): 50; .745; 4.5 INJECTION, SOLUTION INTRAVENOUS at 16:19

## 2018-09-30 RX ADMIN — GABAPENTIN 100 MILLIGRAM(S): 400 CAPSULE ORAL at 13:21

## 2018-09-30 RX ADMIN — NYSTATIN CREAM 1 APPLICATION(S): 100000 CREAM TOPICAL at 05:01

## 2018-09-30 RX ADMIN — Medication 1: at 09:41

## 2018-09-30 RX ADMIN — NYSTATIN CREAM 1 APPLICATION(S): 100000 CREAM TOPICAL at 18:05

## 2018-09-30 RX ADMIN — ENOXAPARIN SODIUM 30 MILLIGRAM(S): 100 INJECTION SUBCUTANEOUS at 18:05

## 2018-09-30 RX ADMIN — Medication 1: at 13:21

## 2018-09-30 RX ADMIN — ENOXAPARIN SODIUM 30 MILLIGRAM(S): 100 INJECTION SUBCUTANEOUS at 05:01

## 2018-09-30 RX ADMIN — HYDROMORPHONE HYDROCHLORIDE 4 MILLIGRAM(S): 2 INJECTION INTRAMUSCULAR; INTRAVENOUS; SUBCUTANEOUS at 12:15

## 2018-09-30 RX ADMIN — HYDROMORPHONE HYDROCHLORIDE 4 MILLIGRAM(S): 2 INJECTION INTRAMUSCULAR; INTRAVENOUS; SUBCUTANEOUS at 22:00

## 2018-09-30 RX ADMIN — GABAPENTIN 100 MILLIGRAM(S): 400 CAPSULE ORAL at 22:00

## 2018-09-30 RX ADMIN — HYDROMORPHONE HYDROCHLORIDE 4 MILLIGRAM(S): 2 INJECTION INTRAMUSCULAR; INTRAVENOUS; SUBCUTANEOUS at 07:33

## 2018-09-30 RX ADMIN — Medication 500 MILLIGRAM(S): at 05:00

## 2018-09-30 RX ADMIN — HYDROMORPHONE HYDROCHLORIDE 4 MILLIGRAM(S): 2 INJECTION INTRAMUSCULAR; INTRAVENOUS; SUBCUTANEOUS at 11:43

## 2018-09-30 RX ADMIN — HYDROMORPHONE HYDROCHLORIDE 4 MILLIGRAM(S): 2 INJECTION INTRAMUSCULAR; INTRAVENOUS; SUBCUTANEOUS at 22:30

## 2018-09-30 RX ADMIN — CYCLOBENZAPRINE HYDROCHLORIDE 5 MILLIGRAM(S): 10 TABLET, FILM COATED ORAL at 05:00

## 2018-09-30 NOTE — PROGRESS NOTE ADULT - SUBJECTIVE AND OBJECTIVE BOX
SUBJECTIVE: No interval change, c/o back pain. Comfortable    OVERNIGHT EVENTS: None    Vital Signs Last 24 Hrs  T(C): 36.8 (30 Sep 2018 04:41), Max: 36.8 (29 Sep 2018 14:16)  T(F): 98.2 (30 Sep 2018 04:41), Max: 98.3 (29 Sep 2018 21:51)  HR: 97 (30 Sep 2018 09:41) (78 - 97)  BP: 93/57 (30 Sep 2018 09:41) (93/55 - 116/69)  BP(mean): --  RR: 18 (30 Sep 2018 09:41) (16 - 18)  SpO2: 98% (30 Sep 2018 09:41) (95% - 98%)  IVF: [ ] IVL [X ] NS+K@ 100  DIET: [ ] Regular [X ] CCD [ ] Renal [ ] Puree [ ] Dysphagia [ ] Tube Feeds:   PCA: [ ] YES [ X] NO   WILSON: [ ] YES [ X] NO [ X] VOID-incontinence  BM: [ S] YES-on 9/26  DRAINS: none    PHYSICAL EXAM:    Constitutional: No Acute Distress     Neurological: AAOx3, follows commands, speech clear and fluent, moves UE 5/5 strength throughout, b/l LLE: 3/5 HF, 4/5 KF/KE, 5/5 DF/PF, RLE: 4/5 HF/KF/KE, 5/5 DF/PF. sensation present, intact, equal throughout                                                 Sensation: [X] intact to light touch  [] decreased:     Pulmonary: Clear to Auscultation, No rales, No rhonchi, No wheezes     Cardiovascular: S1, S2, Regular rate and rhythm     Gastrointestinal: Soft, Non-tender, Non-distended     Extremities: No calf tenderness     Incision: CDI. Flat    LABS:                        9.4    16.1  )-----------( 231      ( 29 Sep 2018 10:20 )             29.2    09-29    136  |  101  |  20  ----------------------------<  138<H>  3.7   |  25  |  0.75    Ca    8.9      29 Sep 2018 10:20      IMAGING:  < from: MR Pelvis w/ IV Cont (09.27.18 @ 18:51) >  There is a large lobulated mass within the uterus and cervix measuring   approximately 11.9 x 8.1 x 8.6 cm. Heterogeneous mass is also noted in   the vagina, measuring 5.7 x 3.4 cm. There is an adjacent left   paracervical lymph node measuring 2.6 x 2.4 cm, left parauterine mass   measuring 2.3 x 2.1 cm and a left lara mass, adjacent to the left common   iliac vessels, measuring 5.7 x 3.8 x 5.0 cm. These masses/lesions   demonstrate heterogeneous enhancement.    < from: VA Duplex Lower Ext Vein Scan, Bilat (09.27.18 @ 12:08) >  Above the knee right-sided lower extremity acute DVT.    Above-the-knee left-sided lower extremity acute DVT.    Thick-walled left Baker's cyst.    MEDICATIONS  (STANDING):  dextrose 50% Injectable 12.5 Gram(s) IV Push once  dextrose 50% Injectable 25 Gram(s) IV Push once  dextrose 50% Injectable 25 Gram(s) IV Push once  enoxaparin Injectable 30 milliGRAM(s) SubCutaneous every 12 hours  gabapentin 100 milliGRAM(s) Oral three times a day  influenza   Vaccine 0.5 milliLiter(s) IntraMuscular once  insulin lispro (HumaLOG) corrective regimen sliding scale   SubCutaneous three times a day before meals  insulin lispro (HumaLOG) corrective regimen sliding scale   SubCutaneous at bedtime  losartan 50 milliGRAM(s) Oral daily  nystatin Powder 1 Application(s) Topical two times a day  sodium chloride 0.9% with potassium chloride 20 mEq/L 1000 milliLiter(s) (100 mL/Hr) IV Continuous <Continuous>    MEDICATIONS  (PRN):  acetaminophen   Tablet .. 650 milliGRAM(s) Oral every 6 hours PRN Temp greater or equal to 38C (100.4F), Mild Pain (1 - 3)  cyclobenzaprine 5 milliGRAM(s) Oral three times a day PRN Muscle Spasm  dextrose 40% Gel 15 Gram(s) Oral once PRN Blood Glucose LESS THAN 70 milliGRAM(s)/deciliter  diphenhydrAMINE   Capsule 25 milliGRAM(s) Oral every 24 hours PRN Insomnia  glucagon  Injectable 1 milliGRAM(s) IntraMuscular once PRN Glucose LESS THAN 70 milligrams/deciliter  HYDROmorphone   Tablet 2 milliGRAM(s) Oral every 4 hours PRN Moderate Pain (4 - 6)  HYDROmorphone   Tablet 4 milliGRAM(s) Oral every 4 hours PRN Severe Pain (7 - 10)  HYDROmorphone  Injectable 0.5 milliGRAM(s) IV Push every 3 hours PRN Breakthrough pain  naloxone Injectable 0.1 milliGRAM(s) IV Push every 3 minutes PRN For ANY of the following changes in patient status:  A. RR LESS THAN 10 breaths per minute, B. Oxygen saturation LESS THAN 90%, C. Sedation score of 6  ondansetron Injectable 4 milliGRAM(s) IV Push every 6 hours PRN Nausea and/or Vomiting

## 2018-09-30 NOTE — PROGRESS NOTE ADULT - PROBLEM SELECTOR PROBLEM 1
Lumbar spinal stenosis
Chronic low back pain, unspecified back pain laterality, with sciatica presence unspecified
Lumbar spinal stenosis
Acute DVT (deep venous thrombosis)
Bone lesion
Lumbar spinal stenosis
UTI (urinary tract infection)
Bone lesion
Bone lesion

## 2018-09-30 NOTE — PROGRESS NOTE ADULT - ASSESSMENT
Ms. Son is a 72 y/o F with HTN, prediabetes and chronic back pain 2/2 spinal stenosis p/w worsening of chronic back pain.  Pt reports pain in lower back with R sided sciatica since early 2017.  She had imaging in 2017 which revealed lumbar spinal stenosis.  She had been prescribed gabapentin as well as percocet which she only needed sparingly.  Pt recently has developed worsening lower back pain with paresthesia radiating to L thigh.  Pain makes it difficult to walk, and she feels that her L leg is weaker.  Pt has been unable to lay flat, or stand 2/2 the pain and has spent most of her time sitting.  She has been taking percocet without relief.  She has chronic incontinence, but no new symptoms.  She has been constipated which she attributes to taking percocet more frequently.  She has not had foot numbness or saddle anesthesia.  She had imaging done 2 weeks ago including MRI which showed an infiltrative process at L2 vertebral body, lumbar spinal stenosis and RP soft tissue mass.  CT a/p showed signs suspicious of uterine ca with mets to bone and lung.  Pt was referred to an oncologist with appointment scheduled later this week, but due to increasing severity of pain, she was advised to come to the ED by her PCP.       At [facility] patient receieved CT guided biopsy. Tx to [facility] for further work up and management of spinal lesion (12 Sep 2018 19:54)    PROCEDURE: Adm 9/13. 9/18 L2 corpectomy, T11-L4 fusion for excision of intraspinal lesion,  w/plastic closure and CSF leak repaired. Muscle flap closure. 9/27 b/l LE DVT-IVCF placed     POD#12    PLAN:  Neuro:  Echo-P. On SQL 30mg Q125, will start full dose SQL on 10/1. Decrease IVF to 50/hr. CBC, BMB AM FU.  Inc activity/OOB. DC planning.     Vasc/Dr CHAUNCEY Caldwell-Bilateral DVT.  Recent spinal surgery. Malignancy.  HTN. Plan 1.  IVC filter successfully placed  .  2.  Please obtain 2d echocardiogram 3.  When safe from a neurosurgical perspective, would transition to full dose anticoagulation (Lovenox 1mg/kg BID)  4.  At some point in the future, once she recovers from surgery and can tolerate full dose anticoagulation, then we will retrieve the IVC filter 5.  Continue to monitor BP on losartan 50mg daily. Zach Caldwell Vascular Medicine [id number]    Medicine/Hosp-Problem: Acute DVT (deep venous thrombosis).  Plan: S/P ivc FILTER. Check 2d-echo. ac one safe from neurosurg.  Problem: UTI (urinary tract infection).  Plan: Cipro x 3 days. pt aware of risks and aware of signs/symptoms of side-effects. Problem: Diarrhea.  Plan: resolved off laxatives.  Problem: Bone lesion.  Plan: -likely neoplasm related pain, metastatic bone lesion of L2 causing severe lower back pain  Problem: Essential (primary) hypertension.  Plan: pt wants to continue just Losartan for now. monitor bp. Problem: Malignant neoplasm of uterus, unspecified site. 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 GYN consult appreciated, not a surgical candidate at this time   Onc input, outpatient f/u. Problem: Cellulitis, unspecified cellulitis site.  Plan: Cellulitis of LLE diagnosed at [facility], resolved. Completed course of Ancef.  Blood cultures NGTD. Problem: Prediabetes.  Plan: Hgb A1c 6.1. Continue insulin sliding scale. Monitor fingersticks closely while on steroids.  Problem: Leukocytosis, unspecified type.  Plan: monitor. DVT vs UTI. Problem: Morbid obesity. Plan; BMI 47- nutrition recs.    Plastic-plastics closure (9/18).   Next aquacel change 10/2.  Plastic Surgery [phone]    Respiratory: Patient instructed to use incentive spirometer [ X] YES [ ] NO              DVT ppx: [X ] SQL [ ] SQH and Venodynes [ ] Left [ ] Right [ X] Bilateral    Discharge Planning:  The patient was evaluated by PT/OT/PMR and recommended S. Acute Rehab.       Assessment:  Please Check When Present   []  GCS  E   V  M     Heart Failure: []Acute, [] acute on chronic , []chronic  Heart Failure:  [] Diastolic (HFpEF), [] Systolic (HFrEF), []Combined (HFpEF and HFrEF), [] RHF, [] Pulm HTN, [] Other    [] CANDIE, [] ATN, [] AIN, [] other  [] CKD1, [] CKD2, [] CKD 3, [] CKD 4, [] CKD 5, []ESRD    Encephalopathy: [] Metabolic, [] Hepatic, [] toxic, [] Neurological, [] Other    Abnormal Nurtitional Status: [] malnurtition (see nutrition note), [ ]underweight: BMI < 19, [] morbid obesity: BMI >40, [] Cachexia    [] Sepsis  [] hypovolemic shock,[] cardiogenic shock, [] hemorrhagic shock, [] neuogenic shock  [] Acute Respiratory Failure  []Cerebral edema, [] Brain compression/ herniation,   [] Functional quadriplegia  [X] Acute blood loss anemia

## 2018-09-30 NOTE — PROGRESS NOTE ADULT - NSHPATTENDINGPLANDISCUSS_GEN_ALL_CORE
CHAUNCEY Caceres
pt

## 2018-10-01 LAB
ANION GAP SERPL CALC-SCNC: 5 MMOL/L — SIGNIFICANT CHANGE UP (ref 5–17)
BUN SERPL-MCNC: 18 MG/DL — SIGNIFICANT CHANGE UP (ref 7–23)
CALCIUM SERPL-MCNC: 8.8 MG/DL — SIGNIFICANT CHANGE UP (ref 8.4–10.5)
CHLORIDE SERPL-SCNC: 101 MMOL/L — SIGNIFICANT CHANGE UP (ref 96–108)
CO2 SERPL-SCNC: 26 MMOL/L — SIGNIFICANT CHANGE UP (ref 22–31)
CREAT SERPL-MCNC: 0.76 MG/DL — SIGNIFICANT CHANGE UP (ref 0.5–1.3)
GLUCOSE BLDC GLUCOMTR-MCNC: 119 MG/DL — HIGH (ref 70–99)
GLUCOSE BLDC GLUCOMTR-MCNC: 140 MG/DL — HIGH (ref 70–99)
GLUCOSE BLDC GLUCOMTR-MCNC: 152 MG/DL — HIGH (ref 70–99)
GLUCOSE BLDC GLUCOMTR-MCNC: 164 MG/DL — HIGH (ref 70–99)
GLUCOSE SERPL-MCNC: 138 MG/DL — HIGH (ref 70–99)
HCT VFR BLD CALC: 27.3 % — LOW (ref 34.5–45)
HGB BLD-MCNC: 8.9 G/DL — LOW (ref 11.5–15.5)
MCHC RBC-ENTMCNC: 29.3 PG — SIGNIFICANT CHANGE UP (ref 27–34)
MCHC RBC-ENTMCNC: 32.6 GM/DL — SIGNIFICANT CHANGE UP (ref 32–36)
MCV RBC AUTO: 89.7 FL — SIGNIFICANT CHANGE UP (ref 80–100)
PLATELET # BLD AUTO: 230 K/UL — SIGNIFICANT CHANGE UP (ref 150–400)
POTASSIUM SERPL-MCNC: 3.8 MMOL/L — SIGNIFICANT CHANGE UP (ref 3.5–5.3)
POTASSIUM SERPL-SCNC: 3.8 MMOL/L — SIGNIFICANT CHANGE UP (ref 3.5–5.3)
RBC # BLD: 3.04 M/UL — LOW (ref 3.8–5.2)
RBC # FLD: 15.3 % — HIGH (ref 10.3–14.5)
SODIUM SERPL-SCNC: 132 MMOL/L — LOW (ref 135–145)
WBC # BLD: 12.5 K/UL — HIGH (ref 3.8–10.5)
WBC # FLD AUTO: 12.5 K/UL — HIGH (ref 3.8–10.5)

## 2018-10-01 PROCEDURE — 93306 TTE W/DOPPLER COMPLETE: CPT | Mod: 26

## 2018-10-01 PROCEDURE — 99232 SBSQ HOSP IP/OBS MODERATE 35: CPT

## 2018-10-01 RX ORDER — ENOXAPARIN SODIUM 100 MG/ML
135 INJECTION SUBCUTANEOUS
Qty: 0 | Refills: 0 | Status: DISCONTINUED | OUTPATIENT
Start: 2018-10-01 | End: 2018-10-03

## 2018-10-01 RX ORDER — HYDROMORPHONE HYDROCHLORIDE 2 MG/ML
0.5 INJECTION INTRAMUSCULAR; INTRAVENOUS; SUBCUTANEOUS
Qty: 0 | Refills: 0 | Status: DISCONTINUED | OUTPATIENT
Start: 2018-10-01 | End: 2018-10-02

## 2018-10-01 RX ORDER — HYDROMORPHONE HYDROCHLORIDE 2 MG/ML
2 INJECTION INTRAMUSCULAR; INTRAVENOUS; SUBCUTANEOUS EVERY 4 HOURS
Qty: 0 | Refills: 0 | Status: DISCONTINUED | OUTPATIENT
Start: 2018-10-01 | End: 2018-10-03

## 2018-10-01 RX ORDER — HYDROMORPHONE HYDROCHLORIDE 2 MG/ML
4 INJECTION INTRAMUSCULAR; INTRAVENOUS; SUBCUTANEOUS EVERY 4 HOURS
Qty: 0 | Refills: 0 | Status: DISCONTINUED | OUTPATIENT
Start: 2018-10-01 | End: 2018-10-03

## 2018-10-01 RX ADMIN — GABAPENTIN 100 MILLIGRAM(S): 400 CAPSULE ORAL at 13:07

## 2018-10-01 RX ADMIN — ENOXAPARIN SODIUM 30 MILLIGRAM(S): 100 INJECTION SUBCUTANEOUS at 05:54

## 2018-10-01 RX ADMIN — Medication 1: at 13:38

## 2018-10-01 RX ADMIN — Medication 650 MILLIGRAM(S): at 17:34

## 2018-10-01 RX ADMIN — NYSTATIN CREAM 1 APPLICATION(S): 100000 CREAM TOPICAL at 17:35

## 2018-10-01 RX ADMIN — HYDROMORPHONE HYDROCHLORIDE 4 MILLIGRAM(S): 2 INJECTION INTRAMUSCULAR; INTRAVENOUS; SUBCUTANEOUS at 16:42

## 2018-10-01 RX ADMIN — CYCLOBENZAPRINE HYDROCHLORIDE 5 MILLIGRAM(S): 10 TABLET, FILM COATED ORAL at 12:56

## 2018-10-01 RX ADMIN — HYDROMORPHONE HYDROCHLORIDE 4 MILLIGRAM(S): 2 INJECTION INTRAMUSCULAR; INTRAVENOUS; SUBCUTANEOUS at 11:01

## 2018-10-01 RX ADMIN — HYDROMORPHONE HYDROCHLORIDE 4 MILLIGRAM(S): 2 INJECTION INTRAMUSCULAR; INTRAVENOUS; SUBCUTANEOUS at 05:55

## 2018-10-01 RX ADMIN — GABAPENTIN 100 MILLIGRAM(S): 400 CAPSULE ORAL at 22:50

## 2018-10-01 RX ADMIN — HYDROMORPHONE HYDROCHLORIDE 4 MILLIGRAM(S): 2 INJECTION INTRAMUSCULAR; INTRAVENOUS; SUBCUTANEOUS at 17:12

## 2018-10-01 RX ADMIN — HYDROMORPHONE HYDROCHLORIDE 4 MILLIGRAM(S): 2 INJECTION INTRAMUSCULAR; INTRAVENOUS; SUBCUTANEOUS at 10:31

## 2018-10-01 RX ADMIN — HYDROMORPHONE HYDROCHLORIDE 4 MILLIGRAM(S): 2 INJECTION INTRAMUSCULAR; INTRAVENOUS; SUBCUTANEOUS at 06:25

## 2018-10-01 RX ADMIN — Medication 650 MILLIGRAM(S): at 18:04

## 2018-10-01 RX ADMIN — Medication 25 MILLIGRAM(S): at 22:50

## 2018-10-01 RX ADMIN — ENOXAPARIN SODIUM 135 MILLIGRAM(S): 100 INJECTION SUBCUTANEOUS at 17:35

## 2018-10-01 RX ADMIN — GABAPENTIN 100 MILLIGRAM(S): 400 CAPSULE ORAL at 05:54

## 2018-10-01 RX ADMIN — NYSTATIN CREAM 1 APPLICATION(S): 100000 CREAM TOPICAL at 05:54

## 2018-10-01 RX ADMIN — Medication 1: at 18:45

## 2018-10-01 NOTE — PROGRESS NOTE ADULT - ASSESSMENT
Assessment:  1.  Bilateral DVT  2.  Recent spinal surgery  3.  Malignancy  4.  HTN      Plan  1.  IVC filter successfully placed  .    2.  When safe from a neurosurgical perspective, would transition to full dose anticoagulation (Lovenox 1mg/kg BID)  3.  At some point in the future, once she recovers from surgery and can tolerate full dose anticoagulation, then we will retrieve the IVC filter  4.  Continue to monitor BP on losartan 50mg daily     Thanks    Zach Caldwell   Vascular Medicine [id number]

## 2018-10-01 NOTE — PROGRESS NOTE ADULT - SUBJECTIVE AND OBJECTIVE BOX
Post-op day # 13 s/p L2 corpectomy, T11-L4 Fusion    OVERNIGHT EVENTS: none    Vital Signs Last 24 Hrs  T(C): 37.2 (01 Oct 2018 09:06), Max: 37.2 (30 Sep 2018 14:45)  T(F): 98.9 (01 Oct 2018 09:06), Max: 98.9 (30 Sep 2018 14:45)  HR: 79 (01 Oct 2018 09:06) (79 - 97)  BP: 107/63 (01 Oct 2018 09:06) (104/65 - 132/68)  BP(mean): --  RR: 18 (01 Oct 2018 09:06) (18 - 18)  SpO2: 97% (01 Oct 2018 09:06) (95% - 97%)    I&O's Detail    30 Sep 2018 07:  -  01 Oct 2018 07:00  --------------------------------------------------------  IN:    Oral Fluid: 1060 mL    sodium chloride 0.9% with potassium chloride 20 mEq/L: 600 mL  Total IN: 1660 mL    OUT:    Incontinent per Collection Ba mL  Total OUT: 650 mL    Total NET: 1010 mL      01 Oct 2018 07:01  -  01 Oct 2018 13:33  --------------------------------------------------------  IN:    Oral Fluid: 365 mL  Total IN: 365 mL    OUT:  Total OUT: 0 mL    Total NET: 365 mL        I&O's Summary    30 Sep 2018 07  -  01 Oct 2018 07:00  --------------------------------------------------------  IN: 1660 mL / OUT: 650 mL / NET: 1010 mL    01 Oct 2018 07:01  -  01 Oct 2018 13:33  --------------------------------------------------------  IN: 365 mL / OUT: 0 mL / NET: 365 mL        PHYSICAL EXAM:  Neurological: Alert, awake oriented x3    Motor exam:         [x] Upper extremity                 D             B          T          WE       WF      HI                                               R         5/5        5/5        5/5       5/5     5/5       5/5                                               L          5/5        5/5        5/5       5/5     5/5       5/5         [x] Lower extremity                Ps          Ha        Quad    EHL        FHL                                               R        5/5        5/5        4/5       4/5         4/5                                               L         5/5        5/5       2/5       4/5          4/5                                                        Sensation: [x] intact to light touch  [] decreased:     Gait : not tested    Cardiovascular: Regular  Respiratory: Clear bilaterally  Gastrointestinal: Abd soft, + BS, non tender  Genitourinary:  Extremities: -C/C/E  Incision/Wound: soft, dressing in place c/d/i.  No collections    TUBES/LINES:  [] CVC  [] A-line  [] Lumbar Drain  [] Ventriculostomy  [] Other    DIET: Consistent carbohydrates diet  [] NPO  [] Mechanical  [] Tube feeds    LABS:                        8.9    12.5  )-----------( 230      ( 01 Oct 2018 10:38 )             27.3     10    132<L>  |  101  |  18  ----------------------------<  138<H>  3.8   |  26  |  0.76    Ca    8.8      01 Oct 2018 10:37              CAPILLARY BLOOD GLUCOSE      POCT Blood Glucose.: 152 mg/dL (01 Oct 2018 13:30)  POCT Blood Glucose.: 119 mg/dL (01 Oct 2018 08:14)  POCT Blood Glucose.: 144 mg/dL (30 Sep 2018 21:26)  POCT Blood Glucose.: 113 mg/dL (30 Sep 2018 17:18)      Allergies    No Known Allergies    Intolerances      MEDICATIONS:  Antibiotics:    Neuro:  acetaminophen   Tablet .. 650 milliGRAM(s) Oral every 6 hours PRN  cyclobenzaprine 5 milliGRAM(s) Oral three times a day PRN  diphenhydrAMINE   Capsule 25 milliGRAM(s) Oral every 24 hours PRN  gabapentin 100 milliGRAM(s) Oral three times a day  HYDROmorphone   Tablet 2 milliGRAM(s) Oral every 4 hours PRN  HYDROmorphone   Tablet 4 milliGRAM(s) Oral every 4 hours PRN  HYDROmorphone  Injectable 0.5 milliGRAM(s) IV Push every 3 hours PRN  ondansetron Injectable 4 milliGRAM(s) IV Push every 6 hours PRN    Anticoagulation:  enoxaparin Injectable 135 milliGRAM(s) SubCutaneous two times a day    OTHER:  dextrose 40% Gel 15 Gram(s) Oral once PRN  dextrose 50% Injectable 12.5 Gram(s) IV Push once  dextrose 50% Injectable 25 Gram(s) IV Push once  dextrose 50% Injectable 25 Gram(s) IV Push once  glucagon  Injectable 1 milliGRAM(s) IntraMuscular once PRN  influenza   Vaccine 0.5 milliLiter(s) IntraMuscular once  insulin lispro (HumaLOG) corrective regimen sliding scale   SubCutaneous three times a day before meals  insulin lispro (HumaLOG) corrective regimen sliding scale   SubCutaneous at bedtime  losartan 50 milliGRAM(s) Oral daily  naloxone Injectable 0.1 milliGRAM(s) IV Push every 3 minutes PRN  nystatin Powder 1 Application(s) Topical two times a day    IVF:    CULTURES:  Culture Results:   >100,000 CFU/ml Citrobacter species ( @ 07:25)    RADIOLOGY & ADDITIONAL TESTS:      ASSESSMENT:     Ms. Son is a 74 y/o F with HTN, prediabetes and chronic back pain 2/2 spinal stenosis p/w worsening of chronic back pain.  Pt reports pain in lower back with R sided sciatica since early .  She had imaging in 2017 which revealed lumbar spinal stenosis.  She had been prescribed gabapentin as well as percocet which she only needed sparingly.  Pt recently has developed worsening lower back pain with paresthesia radiating to L thigh.  Pain makes it difficult to walk, and she feels that her L leg is weaker.  Pt has been unable to lay flat, or stand 2/2 the pain and has spent most of her time sitting.  She has been taking percocet without relief.  She has chronic incontinence, but no new symptoms.  She has been constipated which she attributes to taking percocet more frequently.  She has not had foot numbness or saddle anesthesia.  She had imaging done 2 weeks ago including MRI which showed an infiltrative process at L2 vertebral body, lumbar spinal stenosis and RP soft tissue mass.  CT a/p showed signs suspicious of uterine ca with mets to bone and lung.  Pt was referred to an oncologist with appointment scheduled later this week, but due to increasing severity of pain, she was advised to come to the ED by her PCP.     At [facility] patient receieved CT guided biopsy. Tx to [facility] for further work up and management of spinal lesion (12 Sep 2018 19:54)    Post-op day # 13 s/p L2 corpectomy, T11-L4 Fusion  Please Check When Present   [x]  GCS  E4   V5  M 6    Heart Failure: []Acute, [] acute on chronic , []chronic  Heart Failure:  [] Diastolic (HFpEF), [] Systolic (HFrEF), []Combined (HFpEF and HFrEF), [] RHF, [] Pulm HTN, [] Other    [] CANDIE, [] ATN, [] AIN, [] other  [] CKD1, [] CKD2, [] CKD 3, [] CKD 4, [] CKD 5, []ESRD    Encephalopathy: [] Metabolic, [] Hepatic, [] toxic, [] Neurological, [] Other    Abnormal Nurtitional Status: [] malnurtition (see nutrition note), [ ]underweight: BMI < 19, [x] morbid obesity: BMI >40, [] Cachexia    [] Sepsis  [] hypovolemic shock,[] cardiogenic shock, [] hemorrhagic shock, [] neuogenic shock  [] Acute Respiratory Failure  []Cerebral edema, [] Brain compression/ herniation,   [] Functional quadriplegia  [] Acute blood loss anemia           PLAN:  NEURO: Neurologically stable, will start therapeutic Lovenox for DVT's  CARDIOVASCULAR: Continue Cozaar,  TTE done, will ck official report  PULMONARY: No issue, continue incentive spirometer  RENAL: Bun/creat : stable  GI: Tolerating po diet  HEME: H/H dropping slowly but is adequate  ID: Afebrile  ENDO: Blood sugar controlled on current regimen    DVT PROPHYLAXIS:  [] Venodynes                                [x] Heparin/Lovenox    FALL RISK:  [x] Low Risk                                    [] Impulsive

## 2018-10-01 NOTE — PROGRESS NOTE ADULT - ASSESSMENT
73F hx HTN, prediabetes, spinal stenosis 2/2 metastatic lesion now POD#13 s/p T12-L4 fusion and plastics closure (9/18)    - Next aquacel change 10/6  - Rest of care per primary team    Plastic Surgery  [phone] 73F hx HTN, prediabetes, spinal stenosis 2/2 metastatic lesion now POD#13 s/p T12-L4 fusion and plastics closure (9/18)    - Next aquacel change 10/2  - Rest of care per primary team    Plastic Surgery  [phone]

## 2018-10-01 NOTE — CHART NOTE - NSCHARTNOTEFT_GEN_A_CORE
Source: Patient [ x]    Family [ x]     other [ ] Patient seen for routine LOS follow up.  Patient found in bed with sister at bedside.  Patient reports an improved appetite and intake today and ate >80% of entree at lunch today.  Patient denies N/V.  Patient aware of prediabetic status and has been watching her diet.  has had successful weight loss on weight watchers. Finger sticks 113-166.  Aic 6.1.  was as low as 5.8 in recent months.  Patient is new to coumadin and interested in coumadin and Vit k education. Patoient able to teach back the need for consistency with Vit K intake.  Dxd with B/L DVT to legs, Lumbar spinal stenosis s/p T12-L4 fusion, suspected uterine cancer with lung mets.      Diet : Consistent CHO with snack      Patient reports [ ] nausea  [ ] vomiting [ ] diarrhea [ ] constipation  [ ]chewing problems [ ] swallowing issues  [ ] other:      PO intake:  < 50% [ ] 50-75% [ ]   % [x ]  other :     Source for PO intake [ x] Patient [x ] family [ ] chart [ ] staff [ ] other     Enteral /Parenteral Nutrition: N/A      Current Weight: Current weight not available.  % Weight Change    Pertinent Medications: MEDICATIONS  (STANDING):  dextrose 50% Injectable 12.5 Gram(s) IV Push once  dextrose 50% Injectable 25 Gram(s) IV Push once  dextrose 50% Injectable 25 Gram(s) IV Push once  enoxaparin Injectable 135 milliGRAM(s) SubCutaneous two times a day  gabapentin 100 milliGRAM(s) Oral three times a day  influenza   Vaccine 0.5 milliLiter(s) IntraMuscular once  insulin lispro (HumaLOG) corrective regimen sliding scale   SubCutaneous three times a day before meals  insulin lispro (HumaLOG) corrective regimen sliding scale   SubCutaneous at bedtime  losartan 50 milliGRAM(s) Oral daily  nystatin Powder 1 Application(s) Topical two times a day    MEDICATIONS  (PRN):  acetaminophen   Tablet .. 650 milliGRAM(s) Oral every 6 hours PRN Temp greater or equal to 38C (100.4F), Mild Pain (1 - 3)  cyclobenzaprine 5 milliGRAM(s) Oral three times a day PRN Muscle Spasm  dextrose 40% Gel 15 Gram(s) Oral once PRN Blood Glucose LESS THAN 70 milliGRAM(s)/deciliter  diphenhydrAMINE   Capsule 25 milliGRAM(s) Oral every 24 hours PRN Insomnia  glucagon  Injectable 1 milliGRAM(s) IntraMuscular once PRN Glucose LESS THAN 70 milligrams/deciliter  HYDROmorphone   Tablet 2 milliGRAM(s) Oral every 4 hours PRN Moderate Pain (4 - 6)  HYDROmorphone   Tablet 4 milliGRAM(s) Oral every 4 hours PRN Severe Pain (7 - 10)  HYDROmorphone  Injectable 0.5 milliGRAM(s) IV Push every 3 hours PRN Breakthrough pain  naloxone Injectable 0.1 milliGRAM(s) IV Push every 3 minutes PRN For ANY of the following changes in patient status:  A. RR LESS THAN 10 breaths per minute, B. Oxygen saturation LESS THAN 90%, C. Sedation score of 6  ondansetron Injectable 4 milliGRAM(s) IV Push every 6 hours PRN Nausea and/or Vomiting    Pertinent Labs:  10-01 Na132 mmol/L<L> Glu 138 mg/dL<H> K+ 3.8 mmol/L Cr  0.76 mg/dL BUN 18 mg/dL 09-27 Phos 2.6 mg/dL 09-13 [id number] 6.1 %<H>      Skin: intact.    Estimated Needs:   [ x] no change since previous assessment  [ ] recalculated:       Previous Nutrition Diagnosis:     [x ] Inadequate PRO Energy Intake [ ]Inadequate Oral Intake [ ] Excessive Energy Intake     [ ] Underweight [ ] Increased Nutrient Needs [ ] Overweight/Obesity     [ ] Altered GI Function [ ] Unintended Weight Loss [ ] Food & Nutrition Related Knowledge Deficit [ ] Malnutrition          Nutrition Diagnosis is [ ] ongoing  [ x] resolving [ ] not applicable As patient is eating and feeling better.  Patient denies needing anything more.          New Nutrition Diagnosis: [ ] not applicable    [ ] Inadequate Protein Energy Intake [ ]Inadequate Oral Intake [ ] Excessive Energy Intake     [ ] Underweight [ ] Increased Nutrient Needs [ ] Overweight/Obesity     [ ] Altered GI Function [ ] Unintended Weight Loss [x ] Food & Nutrition Related Knowledge Deficit[ ] Limited Adherence to nutrition related recommendations [ ] Malnutrition  [ ] other: Free text       Related to: Coagulability     As evidenced by: B/L DVTs and new to coumadin     Interventions: Patient provided with written and verbal diet teaching for Vit K and Coumadin therapy.  Patient able to teach back.     Recommend    [ ] Change Diet To:    [ ] Nutrition Supplement    [ ] Nutrition Support    [ ] Other:        Monitoring and Evaluation:     [ x] PO intake [x ] Tolerance to diet prescription [x ] weights [ x] follow up per protocol    [x ] other: Understanding of Diet and coumadin.  Nurse Liaison to provide patient with color copy of Vit K and Coumadin education.  Black and white given.

## 2018-10-01 NOTE — PROGRESS NOTE ADULT - SUBJECTIVE AND OBJECTIVE BOX
Plastic Surgery Progress Note    Subjective: Patient examined at bedside. Patient doing well, no complaints at this time.       Objective:    Vital Signs Last 24 Hrs  T(C): 36.8 (01 Oct 2018 04:16), Max: 37.2 (30 Sep 2018 14:45)  T(F): 98.2 (01 Oct 2018 04:16), Max: 98.9 (30 Sep 2018 14:45)  HR: 85 (01 Oct 2018 05:49) (83 - 97)  BP: 111/60 (01 Oct 2018 05:49) (93/55 - 132/68)  BP(mean): --  RR: 18 (01 Oct 2018 04:16) (18 - 18)  SpO2: 96% (01 Oct 2018 04:16) (95% - 98%)    I&O's Summary    30 Sep 2018 07:01  -  01 Oct 2018 07:00  --------------------------------------------------------  IN: 1660 mL / OUT: 650 mL / NET: 1010 mL          Exam:  General: NAD  Respiratory: Breathing comfortably  Cardiovascular: NSR  Back: soft, dressing in place c/d/i.  No collections                              9.4    16.1  )-----------( 231      ( 29 Sep 2018 10:20 )             29.2   09-29    136  |  101  |  20  ----------------------------<  138<H>  3.7   |  25  |  0.75    Ca    8.9      29 Sep 2018 10:20

## 2018-10-01 NOTE — PROGRESS NOTE ADULT - SUBJECTIVE AND OBJECTIVE BOX
Vascular Cardiology      PAGER:  [phone]               [facility] [id number]              EMAIL [email or web address]   OFFICE [phone]                          CC:  DVT    INTERVAL HISTORY:  No events.   No CP or SOB.  Legs feel ok.      Allergies    No Known Allergies    Intolerances       MEDICATIONS  (STANDING):  dextrose 50% Injectable 12.5 Gram(s) IV Push once  dextrose 50% Injectable 25 Gram(s) IV Push once  dextrose 50% Injectable 25 Gram(s) IV Push once  enoxaparin Injectable 135 milliGRAM(s) SubCutaneous two times a day  gabapentin 100 milliGRAM(s) Oral three times a day  influenza   Vaccine 0.5 milliLiter(s) IntraMuscular once  insulin lispro (HumaLOG) corrective regimen sliding scale   SubCutaneous three times a day before meals  insulin lispro (HumaLOG) corrective regimen sliding scale   SubCutaneous at bedtime  losartan 50 milliGRAM(s) Oral daily  nystatin Powder 1 Application(s) Topical two times a day    MEDICATIONS  (PRN):  acetaminophen   Tablet .. 650 milliGRAM(s) Oral every 6 hours PRN Temp greater or equal to 38C (100.4F), Mild Pain (1 - 3)  cyclobenzaprine 5 milliGRAM(s) Oral three times a day PRN Muscle Spasm  dextrose 40% Gel 15 Gram(s) Oral once PRN Blood Glucose LESS THAN 70 milliGRAM(s)/deciliter  diphenhydrAMINE   Capsule 25 milliGRAM(s) Oral every 24 hours PRN Insomnia  glucagon  Injectable 1 milliGRAM(s) IntraMuscular once PRN Glucose LESS THAN 70 milligrams/deciliter  HYDROmorphone   Tablet 2 milliGRAM(s) Oral every 4 hours PRN Moderate Pain (4 - 6)  HYDROmorphone   Tablet 4 milliGRAM(s) Oral every 4 hours PRN Severe Pain (7 - 10)  HYDROmorphone  Injectable 0.5 milliGRAM(s) IV Push every 3 hours PRN Breakthrough pain  naloxone Injectable 0.1 milliGRAM(s) IV Push every 3 minutes PRN For ANY of the following changes in patient status:  A. RR LESS THAN 10 breaths per minute, B. Oxygen saturation LESS THAN 90%, C. Sedation score of 6  ondansetron Injectable 4 milliGRAM(s) IV Push every 6 hours PRN Nausea and/or Vomiting        PAST MEDICAL & SURGICAL HISTORY:  Lumbar spinal stenosis  Prediabetes  Essential (primary) hypertension  S/P right knee arthroscopy  S/P dilation and curettage: Early 2000&#x27;s for DUB, findings benign.  S/P cholecystectomy      FAMILY HISTORY:  Family history of acute myocardial infarction (Mother): mother  Family history of diabetes mellitus (Mother, Sibling)      SOCIAL HISTORY:  unchanged    REVIEW OF SYSTEMS:  CONSTITUTIONAL: No fever, weight loss, or fatigue  EYES: No eye pain, visual disturbances, or discharge  ENMT:  No difficulty hearing, tinnitus, vertigo; No sinus or throat pain  NECK: No pain or stiffness  RESPIRATORY: No cough, wheezing, chills or hemoptysis; No Shortness of Breath  CARDIOVASCULAR: No chest pain, palpitations, passing out, dizziness,   mild LE edema  GASTROINTESTINAL: No abdominal or epigastric pain. No nausea, vomiting, or hematemesis; No diarrhea or constipation. No melena or hematochezia.  GENITOURINARY: No dysuria, frequency, hematuria, or incontinence  NEUROLOGICAL: No headaches, memory loss, loss of strength, numbness, or tremors  SKIN: No itching, burning, rashes, or lesions   LYMPH Nodes: No enlarged glands  ENDOCRINE: No heat or cold intolerance; No hair loss  MUSCULOSKELETAL: No joint pain or swelling; No muscle, back, or extremity pain  PSYCHIATRIC: No depression, anxiety, mood swings, or difficulty sleeping  HEME/LYMPH: No easy bruising, or bleeding gums  ALLERY AND IMMUNOLOGIC: No hives or eczema	    [ x] All others negative	  [ ] Unable to obtain     ICU Vital Signs Last 24 Hrs  T(C): 37.2 (01 Oct 2018 09:06), Max: 37.2 (30 Sep 2018 14:45)  T(F): 98.9 (01 Oct 2018 09:06), Max: 98.9 (30 Sep 2018 14:45)  HR: 79 (01 Oct 2018 09:06) (79 - 97)  BP: 107/63 (01 Oct 2018 09:06) (104/65 - 132/68)  BP(mean): --  ABP: --  ABP(mean): --  RR: 18 (01 Oct 2018 09:06) (18 - 18)  SpO2: 97% (01 Oct 2018 09:06) (95% - 97%)        Appearance: Normal	, overweight  HEENT:   Normal oral mucosa, PERRL, EOMI	  Lymphatic: No lymphadenopathy  Cardiovascular: Normal S1 S2, No JVD, No murmurs, No edema  Respiratory: Lungs clear to auscultation	  Psychiatry: A & O x 3, Mood & affect appropriate  Gastrointestinal:  Soft, Non-tender, + BS	  Skin: No rashes, No ecchymoses, No cyanosis	  Neurologic: weakness  Extremities: Normal range of motion, No clubbing, cyanosis or edema  Vascular: Peripheral pulses palpable 2+ bilaterally      < from: Cardiac Cath Lab - Adult (09.27.18 @ 15:23) >  INTERVENTIONAL RECOMMENDATIONS: Peripheral Diagnostic Summary:  -patent IVC and bilateral renal veins.  -patent right common iliac vein.  Peripheral Interventional Summary:  -Successful IVC filter placement (Celect-Cook).  Plan  -retrieve IVC filter once safely on antiocoagulation.  -FU with Dr. Zach Caldwell in 3-4 weeks.    < end of copied text >  < from: Cardiac Cath Lab - Adult (09.27.18 @ 15:23) >  INTERVENTIONAL RECOMMENDATIONS: Peripheral Diagnostic Summary:  -patent IVC and bilateral renal veins.  -patent right common iliac vein.  Peripheral Interventional Summary:  -Successful IVC filter placement (Celect-Cook).  Plan  -retrieve IVC filter once safely on antiocoagulation.  -FU with Dr. Zach Caldwell in 3-4 weeks.      < end of copied text >

## 2018-10-02 ENCOUNTER — TRANSCRIPTION ENCOUNTER (OUTPATIENT)
Age: 73
End: 2018-10-02

## 2018-10-02 LAB
ANION GAP SERPL CALC-SCNC: 7 MMOL/L — SIGNIFICANT CHANGE UP (ref 5–17)
BUN SERPL-MCNC: 16 MG/DL — SIGNIFICANT CHANGE UP (ref 7–23)
CALCIUM SERPL-MCNC: 8.9 MG/DL — SIGNIFICANT CHANGE UP (ref 8.4–10.5)
CHLORIDE SERPL-SCNC: 101 MMOL/L — SIGNIFICANT CHANGE UP (ref 96–108)
CO2 SERPL-SCNC: 28 MMOL/L — SIGNIFICANT CHANGE UP (ref 22–31)
CREAT SERPL-MCNC: 0.79 MG/DL — SIGNIFICANT CHANGE UP (ref 0.5–1.3)
GLUCOSE BLDC GLUCOMTR-MCNC: 115 MG/DL — HIGH (ref 70–99)
GLUCOSE BLDC GLUCOMTR-MCNC: 127 MG/DL — HIGH (ref 70–99)
GLUCOSE BLDC GLUCOMTR-MCNC: 131 MG/DL — HIGH (ref 70–99)
GLUCOSE BLDC GLUCOMTR-MCNC: 146 MG/DL — HIGH (ref 70–99)
GLUCOSE BLDC GLUCOMTR-MCNC: 164 MG/DL — HIGH (ref 70–99)
GLUCOSE SERPL-MCNC: 147 MG/DL — HIGH (ref 70–99)
POTASSIUM SERPL-MCNC: 3.9 MMOL/L — SIGNIFICANT CHANGE UP (ref 3.5–5.3)
POTASSIUM SERPL-SCNC: 3.9 MMOL/L — SIGNIFICANT CHANGE UP (ref 3.5–5.3)
SODIUM SERPL-SCNC: 136 MMOL/L — SIGNIFICANT CHANGE UP (ref 135–145)

## 2018-10-02 PROCEDURE — 99232 SBSQ HOSP IP/OBS MODERATE 35: CPT

## 2018-10-02 RX ORDER — FAMOTIDINE 10 MG/ML
20 INJECTION INTRAVENOUS DAILY
Qty: 0 | Refills: 0 | Status: DISCONTINUED | OUTPATIENT
Start: 2018-10-02 | End: 2018-10-03

## 2018-10-02 RX ADMIN — HYDROMORPHONE HYDROCHLORIDE 4 MILLIGRAM(S): 2 INJECTION INTRAMUSCULAR; INTRAVENOUS; SUBCUTANEOUS at 00:46

## 2018-10-02 RX ADMIN — NYSTATIN CREAM 1 APPLICATION(S): 100000 CREAM TOPICAL at 05:37

## 2018-10-02 RX ADMIN — GABAPENTIN 100 MILLIGRAM(S): 400 CAPSULE ORAL at 23:05

## 2018-10-02 RX ADMIN — Medication 25 MILLIGRAM(S): at 23:05

## 2018-10-02 RX ADMIN — CYCLOBENZAPRINE HYDROCHLORIDE 5 MILLIGRAM(S): 10 TABLET, FILM COATED ORAL at 15:51

## 2018-10-02 RX ADMIN — LOSARTAN POTASSIUM 50 MILLIGRAM(S): 100 TABLET, FILM COATED ORAL at 05:38

## 2018-10-02 RX ADMIN — HYDROMORPHONE HYDROCHLORIDE 4 MILLIGRAM(S): 2 INJECTION INTRAMUSCULAR; INTRAVENOUS; SUBCUTANEOUS at 10:14

## 2018-10-02 RX ADMIN — HYDROMORPHONE HYDROCHLORIDE 4 MILLIGRAM(S): 2 INJECTION INTRAMUSCULAR; INTRAVENOUS; SUBCUTANEOUS at 13:58

## 2018-10-02 RX ADMIN — ENOXAPARIN SODIUM 135 MILLIGRAM(S): 100 INJECTION SUBCUTANEOUS at 18:03

## 2018-10-02 RX ADMIN — HYDROMORPHONE HYDROCHLORIDE 4 MILLIGRAM(S): 2 INJECTION INTRAMUSCULAR; INTRAVENOUS; SUBCUTANEOUS at 06:20

## 2018-10-02 RX ADMIN — GABAPENTIN 100 MILLIGRAM(S): 400 CAPSULE ORAL at 05:38

## 2018-10-02 RX ADMIN — HYDROMORPHONE HYDROCHLORIDE 4 MILLIGRAM(S): 2 INJECTION INTRAMUSCULAR; INTRAVENOUS; SUBCUTANEOUS at 09:44

## 2018-10-02 RX ADMIN — HYDROMORPHONE HYDROCHLORIDE 4 MILLIGRAM(S): 2 INJECTION INTRAMUSCULAR; INTRAVENOUS; SUBCUTANEOUS at 14:28

## 2018-10-02 RX ADMIN — HYDROMORPHONE HYDROCHLORIDE 4 MILLIGRAM(S): 2 INJECTION INTRAMUSCULAR; INTRAVENOUS; SUBCUTANEOUS at 00:16

## 2018-10-02 RX ADMIN — ENOXAPARIN SODIUM 135 MILLIGRAM(S): 100 INJECTION SUBCUTANEOUS at 05:38

## 2018-10-02 RX ADMIN — GABAPENTIN 100 MILLIGRAM(S): 400 CAPSULE ORAL at 13:58

## 2018-10-02 RX ADMIN — HYDROMORPHONE HYDROCHLORIDE 4 MILLIGRAM(S): 2 INJECTION INTRAMUSCULAR; INTRAVENOUS; SUBCUTANEOUS at 05:39

## 2018-10-02 NOTE — PROGRESS NOTE ADULT - SUBJECTIVE AND OBJECTIVE BOX
SUBJECTIVE: Patient was seen and evaluated at bedside. Patient is resting in bed and is in no new acute distress.     OVERNIGHT EVENTS: none     Vital Signs Last 24 Hrs  T(C): 37.1 (02 Oct 2018 05:17), Max: 37.2 (01 Oct 2018 20:22)  T(F): 98.8 (02 Oct 2018 05:17), Max: 98.9 (01 Oct 2018 20:22)  HR: 90 (02 Oct 2018 05:17) (78 - 93)  BP: 130/71 (02 Oct 2018 05:17) (104/69 - 130/71)  BP(mean): --  RR: 18 (02 Oct 2018 05:17) (18 - 18)  SpO2: 97% (02 Oct 2018 05:17) (96% - 98%)    PHYSICAL EXAM:    General: No Acute Distress     Neurological: Awake, alert oriented to person, place and time, Following Commands, PERRL, EOMI, Face Symmetrical, Speech Fluent, Moving all extremities, Muscle Strength- lle proximal 2/5 and distal 4/5 , right lower 3/5 proximal distal 4+/5 No Drift, Sensation to Light Touch Intact    Pulmonary: Clear to Auscultation, No Rales, No Rhonchi, No Wheezes     Cardiovascular: S1, S2, Regular Rate and Rhythm     Gastrointestinal: Soft, Nontender, Nondistended     Incision: clean and dry     LABS:                        8.9    12.5  )-----------( 230      ( 01 Oct 2018 10:38 )             27.3    10-01    132<L>  |  101  |  18  ----------------------------<  138<H>  3.8   |  26  |  0.76    Ca    8.8      01 Oct 2018 10:37      Hemoglobin A1C, Whole Blood: 6.1 % (09-13 @ 08:06)      10-01 @ 07:01  -  10-02 @ 07:00  --------------------------------------------------------  IN: 1475 mL / OUT: 450 mL / NET: 1025 mL      DRAINS:     MEDICATIONS:  Antibiotics:    Neuro:  acetaminophen   Tablet .. 650 milliGRAM(s) Oral every 6 hours PRN Temp greater or equal to 38C (100.4F), Mild Pain (1 - 3)  cyclobenzaprine 5 milliGRAM(s) Oral three times a day PRN Muscle Spasm  diphenhydrAMINE   Capsule 25 milliGRAM(s) Oral every 24 hours PRN Insomnia  gabapentin 100 milliGRAM(s) Oral three times a day  HYDROmorphone   Tablet 2 milliGRAM(s) Oral every 4 hours PRN Moderate Pain (4 - 6)  HYDROmorphone   Tablet 4 milliGRAM(s) Oral every 4 hours PRN Severe Pain (7 - 10)  ondansetron Injectable 4 milliGRAM(s) IV Push every 6 hours PRN Nausea and/or Vomiting    Cardiac:  losartan 50 milliGRAM(s) Oral daily    Pulm:    GI/:    Other:   dextrose 40% Gel 15 Gram(s) Oral once PRN Blood Glucose LESS THAN 70 milliGRAM(s)/deciliter  dextrose 50% Injectable 12.5 Gram(s) IV Push once  dextrose 50% Injectable 25 Gram(s) IV Push once  dextrose 50% Injectable 25 Gram(s) IV Push once  enoxaparin Injectable 135 milliGRAM(s) SubCutaneous two times a day  glucagon  Injectable 1 milliGRAM(s) IntraMuscular once PRN Glucose LESS THAN 70 milligrams/deciliter  influenza   Vaccine 0.5 milliLiter(s) IntraMuscular once  insulin lispro (HumaLOG) corrective regimen sliding scale   SubCutaneous three times a day before meals  insulin lispro (HumaLOG) corrective regimen sliding scale   SubCutaneous at bedtime  naloxone Injectable 0.1 milliGRAM(s) IV Push every 3 minutes PRN For ANY of the following changes in patient status:  A. RR LESS THAN 10 breaths per minute, B. Oxygen saturation LESS THAN 90%, C. Sedation score of 6    DIET: [] Regular [] CCD [] Renal [] Puree [] Dysphagia [] Tube Feeds: ccd diet     IMAGING: no new imaging today

## 2018-10-02 NOTE — DISCHARGE NOTE ADULT - ADDITIONAL INSTRUCTIONS
Please only do sponge bath while at rehab. Keep surgical site clean and dry.  Please place aqua cell dressing with gauze and tape to surgical site every 5 days.   If notice any new weakness, numbness, tingling, severe pain., discharge from surgical site or fever then please come back to emergency room.

## 2018-10-02 NOTE — DISCHARGE NOTE ADULT - PROVIDER TOKENS
TOKEN:'[id number]:'[id number]:'[id number]',CIPRIANO:'[id number]'

## 2018-10-02 NOTE — DISCHARGE NOTE ADULT - NS AS ACTIVITY OBS
Walking-Outdoors allowed/Do not make important decisions/Do not drive or operate machinery/No Heavy lifting/straining/Stairs allowed/Showering allowed/Walking-Indoors allowed

## 2018-10-02 NOTE — DISCHARGE NOTE ADULT - MEDICATION SUMMARY - MEDICATIONS TO TAKE
I will START or STAY ON the medications listed below when I get home from the hospital:    acetaminophen 325 mg oral tablet  -- 2 tab(s) by mouth every 6 hours, As needed, Temp greater or equal to 38C (100.4F), Mild Pain (1 - 3)  -- Indication: For Pain    HYDROmorphone 2 mg oral tablet  -- 1 tab(s) by mouth every 4 hours, As needed, Moderate Pain (4 - 6)  -- Indication: For Pain    HYDROmorphone 4 mg oral tablet  -- 1 tab(s) by mouth every 4 hours, As needed, Severe Pain (7 - 10)  -- Indication: For Pain    losartan 50 mg oral tablet  -- 1 tab(s) by mouth once a day  -- Indication: For Blood pressure     enoxaparin  -- 140 milligram(s) subcutaneous every 12 hours  -- Indication: For Dvt treatment     gabapentin 100 mg oral capsule  -- 1 cap(s) by mouth 3 times a day  -- Indication: For Pain    famotidine 20 mg oral tablet  -- 1 tab(s) by mouth once a day, As needed, gerd  -- Indication: For Stomach upset     lactulose 10 g/15 mL oral syrup  -- 30 milliliter(s) by mouth every 12 hours as needed for constipation   -- Indication: For Laxative     docusate sodium 100 mg oral capsule  -- 1 cap(s) by mouth 3 times a day  -- Indication: For Stool softener     polyethylene glycol 3350 oral powder for reconstitution  -- 17 gram(s) by mouth once a day  -- Indication: For Laxative     senna oral tablet  -- 2 tab(s) by mouth once a day (at bedtime)  -- Indication: For Stool softener     cyclobenzaprine 5 mg oral tablet  -- 1 tab(s) by mouth 3 times a day, As needed, Muscle Spasm  -- Indication: For Muscle relaxant

## 2018-10-02 NOTE — DISCHARGE NOTE ADULT - PATIENT PORTAL LINK FT
You can access the [facility] Patient Portal, offered by [facility], by registering with the following website: [email or web address]

## 2018-10-02 NOTE — DISCHARGE NOTE ADULT - REASON FOR ADMISSION
Ms. Son is a 74 y/o F with HTN, prediabetes and chronic back pain 2/2 spinal stenosis p/w worsening of chronic back pain.  Pt reports pain in lower back with R sided sciatica since early 2017.  She had imaging in 2017 which revealed lumbar spinal stenosis.  She had been prescribed gabapentin as well as percocet which she only needed sparingly.  Pt recently has developed worsening lower back pain with paresthesia radiating to L thigh.  Pain makes it difficult to walk, and she feels that her L leg is weaker.  Pt has been unable to lay flat, or stand 2/2 the pain and has spent most of her time sitting.  She has been taking percocet without relief.  She has chronic incontinence, but no new symptoms.  She has been constipated which she attributes to taking percocet more frequently.  She has not had foot numbness or saddle anesthesia.  She had imaging done 2 weeks ago including MRI which showed an infiltrative process at L2 vertebral body, lumbar spinal stenosis and RP soft tissue mass.  CT a/p showed signs suspicious of uterine ca with mets to bone and lung.  Pt was referred to an oncologist with appointment scheduled later this week, but due to increasing severity of pain, she was advised to come to the ED by her PCP.       At [facility] patient received CT guided biopsy. Tx to [facility] for further work up and management of spinal lesion.  Patient had a l2 corpectomy , T11-L4 fusion with plastic closure-csf leak repaired.

## 2018-10-02 NOTE — DISCHARGE NOTE ADULT - CARE PROVIDERS DIRECT ADDRESSES
[email or web address]

## 2018-10-02 NOTE — DISCHARGE NOTE ADULT - CARE PROVIDER_API CALL
Rosi Caceres (DO), Neurological Surgery  [address]  [address]  [place] [address]  Phone: [phone]  Fax: [phone]    Dominik Bee), Surgery  PlasticReconstruct  [address]  [address]  [place] [address]  Phone: [phone]  Fax: [phone]    Clint Hairston (MD), Therapeutic Radiology  [address]  [place] [address]  Phone: [phone]  Fax: [phone]    Zach Caldwell (DO), Internal Medicine; Nuclear Cardiology  [address] [place] [address]  Phone: [phone]  Fax: [phone]

## 2018-10-02 NOTE — PROGRESS NOTE ADULT - ASSESSMENT
HPI:  Ms. Son is a 72 y/o F with HTN, prediabetes and chronic back pain 2/2 spinal stenosis p/w worsening of chronic back pain.  Pt reports pain in lower back with R sided sciatica since early 2017.  She had imaging in 2017 which revealed lumbar spinal stenosis.  She had been prescribed gabapentin as well as percocet which she only needed sparingly.  Pt recently has developed worsening lower back pain with paresthesia radiating to L thigh.  Pain makes it difficult to walk, and she feels that her L leg is weaker.  Pt has been unable to lay flat, or stand 2/2 the pain and has spent most of her time sitting.  She has been taking percocet without relief.  She has chronic incontinence, but no new symptoms.  She has been constipated which she attributes to taking percocet more frequently.  She has not had foot numbness or saddle anesthesia.  She had imaging done 2 weeks ago including MRI which showed an infiltrative process at L2 vertebral body, lumbar spinal stenosis and RP soft tissue mass.  CT a/p showed signs suspicious of uterine ca with mets to bone and lung.  Pt was referred to an oncologist with appointment scheduled later this week, but due to increasing severity of pain, she was advised to come to the ED by her PCP.       At [facility] patient receieved CT guided biopsy. Tx to [facility] for further work up and management of spinal lesion (12 Sep 2018 19:54)    PROCEDURE: Muscle flap closure  Corpectomy of single segment of lumbar spine for excision of intraspinal lesion-l2 corpectomy and t11-l4 fusion-9/18      POD# 14     PLAN:  1 Out of bed   2 continue PT   3 PRN pain meds   4 dvt ppx on full dose AC   5 continue losartan   6 awaiting rehab  Assessment:  Please Check When Present   []  GCS  E   V  M     Heart Failure: []Acute, [] acute on chronic , []chronic  Heart Failure:  [] Diastolic (HFpEF), [] Systolic (HFrEF), []Combined (HFpEF and HFrEF), [] RHF, [] Pulm HTN, [] Other    [] CANDIE, [] ATN, [] AIN, [] other  [] CKD1, [] CKD2, [] CKD 3, [] CKD 4, [] CKD 5, []ESRD    Encephalopathy: [] Metabolic, [] Hepatic, [] toxic, [] Neurological, [] Other    Abnormal Nurtitional Status: [] malnurtition (see nutrition note), [ ]underweight: BMI < 19, [] morbid obesity: BMI >40, [] Cachexia    [] Sepsis  [] hypovolemic shock,[] cardiogenic shock, [] hemorrhagic shock, [] neuogenic shock  [] Acute Respiratory Failure  []Cerebral edema, [] Brain compression/ herniation,   [] Functional quadriplegia  [] Acute blood loss anemia HPI:  Ms. Son is a 72 y/o F with HTN, prediabetes and chronic back pain 2/2 spinal stenosis p/w worsening of chronic back pain.  Pt reports pain in lower back with R sided sciatica since early 2017.  She had imaging in 2017 which revealed lumbar spinal stenosis.  She had been prescribed gabapentin as well as percocet which she only needed sparingly.  Pt recently has developed worsening lower back pain with paresthesia radiating to L thigh.  Pain makes it difficult to walk, and she feels that her L leg is weaker.  Pt has been unable to lay flat, or stand 2/2 the pain and has spent most of her time sitting.  She has been taking percocet without relief.  She has chronic incontinence, but no new symptoms.  She has been constipated which she attributes to taking percocet more frequently.  She has not had foot numbness or saddle anesthesia.  She had imaging done 2 weeks ago including MRI which showed an infiltrative process at L2 vertebral body, lumbar spinal stenosis and RP soft tissue mass.  CT a/p showed signs suspicious of uterine ca with mets to bone and lung.  Pt was referred to an oncologist with appointment scheduled later this week, but due to increasing severity of pain, she was advised to come to the ED by her PCP.       At [facility] patient receieved CT guided biopsy. Tx to [facility] for further work up and management of spinal lesion (12 Sep 2018 19:54)    PROCEDURE: Muscle flap closure  Corpectomy of single segment of lumbar spine for excision of intraspinal lesion-l2 corpectomy and t11-l4 fusion-9/18      POD# 14     PLAN:  1 Out of bed   2 continue PT   3 PRN pain meds   4 dvt ppx on full dose AC   5 continue losartan   6 awaiting rehab  7 check sodium today   Assessment:  Please Check When Present   []  GCS  E   V  M     Heart Failure: []Acute, [] acute on chronic , []chronic  Heart Failure:  [] Diastolic (HFpEF), [] Systolic (HFrEF), []Combined (HFpEF and HFrEF), [] RHF, [] Pulm HTN, [] Other    [] CANDIE, [] ATN, [] AIN, [] other  [] CKD1, [] CKD2, [] CKD 3, [] CKD 4, [] CKD 5, []ESRD    Encephalopathy: [] Metabolic, [] Hepatic, [] toxic, [] Neurological, [] Other    Abnormal Nurtitional Status: [] malnurtition (see nutrition note), [ ]underweight: BMI < 19, [] morbid obesity: BMI >40, [] Cachexia    [] Sepsis  [] hypovolemic shock,[] cardiogenic shock, [] hemorrhagic shock, [] neuogenic shock  [] Acute Respiratory Failure  []Cerebral edema, [] Brain compression/ herniation,   [] Functional quadriplegia  [] Acute blood loss anemia

## 2018-10-02 NOTE — DISCHARGE NOTE ADULT - CARE PLAN
Principal Discharge DX:	Metastasis to spinal column  Goal:	Increase activity, improve balance and strength. Principal Discharge DX:	Metastasis to spinal column  Goal:	Increase activity, improve balance and strength.  Assessment and plan of treatment:	Follow up with Dr. Caceres- Neurosurgeon after rehab-Please call office to confirm appointment.   Follow up with Plastic surgeon Dr. Bee after rehab-Please call office to confirm appointment.   Follow up with Oncologist at [facility] after rehab-Please call to make an [phone].   Follow up with OBGYN physician Dr. Gary Goldberg as outpatient after rehab-Please call [phone] confirm appointment.   Follow up with Radiation medicine Dr. Hairston as outpatient-Please call office for appointment.   Follow up with vascular surgeon Dr. Caldwell after rehab-Please call office to make appointment.   Follow up with primary care provider after rehab.

## 2018-10-02 NOTE — DISCHARGE NOTE ADULT - PLAN OF CARE
Increase activity, improve balance and strength. Follow up with Dr. Caceres- Neurosurgeon after rehab-Please call office to confirm appointment.   Follow up with Plastic surgeon Dr. Bee after rehab-Please call office to confirm appointment.   Follow up with Oncologist at [facility] after rehab-Please call to make an [phone].   Follow up with OBGYN physician Dr. Gary Goldberg as outpatient after rehab-Please call [phone] confirm appointment.   Follow up with Radiation medicine Dr. Hairston as outpatient-Please call office for appointment.   Follow up with vascular surgeon Dr. Caldwell after rehab-Please call office to make appointment.   Follow up with primary care provider after rehab.

## 2018-10-02 NOTE — DISCHARGE NOTE ADULT - MEDICATION SUMMARY - MEDICATIONS TO STOP TAKING
I will STOP taking the medications listed below when I get home from the hospital:    ceFAZolin 1 g intravenous injection  -- 1 gram(s) intravenous every 8 hours    HYDROmorphone  -- 1 milligram(s) IV push every 4 hours, As Needed for Moderate and Severe Pain, hold for oversedation/lethargy or if RR <12    Percocet 5/325 oral tablet  -- 1 tab(s) by mouth every 6 hours, As Needed, hold for oversedation/lethargy or if RR <12

## 2018-10-02 NOTE — PROGRESS NOTE ADULT - ASSESSMENT
Assessment:  1.  Bilateral DVT  2.  Recent spinal surgery  3.  Malignancy  4.  HTN      Plan  1.  IVC filter in place    2.  Appreciate Nsx recs- continue with Lovenox 1mg/kg BID  3.  At some point in the future, once she recovers from surgery and can tolerate full dose anticoagulation, then we will retrieve the IVC filter (as an outpatient)  4.  Continue to monitor BP on losartan 50mg daily     Thanks    Zach Caldwell   Vascular Medicine [id number]

## 2018-10-02 NOTE — DISCHARGE NOTE ADULT - HOSPITAL COURSE
Patient had a l2 corpectomy , T11-L4 fusion with plastic closure-csf leak repaired. Patient presented from [facility] after a biopsy showing malignant neoplasm to spine. Pan scan of the body showed a tumor in uterus. Patient went for a spinal stabilization here and post surgery patient was under icu management in PACU. Patient was given pain meds, ivf and was started back on routine home meds. Patient's closure was done by plastic surgery team and they did the routine check on the patient. Patient's surgical drains were removed on the floor. On the floor patient was seen by physical therapy and was recommended for rehab. Patient was also seen by hospitalist to medically mange. Patient was seen by palliative pain management team to control pain. Patient was also seen by gyn physician regarding uterus mass and recommended for outpatient follow up. Patient was also followed by oncology team and radiation medicine team and the plan was to follow up as outpatient after rehab. While on the floor patient had a doppler study of the leg which showed extended dvts was seen by vascular surgery. An IVC filter was placed to prevent PE. Patient was subsequently cleared for full dose anticoagulation and patient was started on sq lovenox. Patient had echo cardiogram while in hospital which was stable Wound care also followed the patient while inhouse. Patient was cleared by physical therapy for discharge. Patient was discharged to rehab with follow up instructions. Patient had a l2 corpectomy , T11-L4 fusion with plastic closure-csf leak repaired. Patient presented from [facility] after a biopsy showing malignant neoplasm to spine. Pan scan of the body showed a tumor in uterus. Patient went for a spinal stabilization here and post surgery patient was under icu management in PACU. Patient was given pain meds, ivf and was started back on routine home meds. Patient's closure was done by plastic surgery team and they did the routine check on the patient. Patient's surgical drains were removed on the floor. On the floor patient was seen by physical therapy and was recommended for rehab. Patient was also seen by hospitalist to medically mange. Patient was seen by palliative pain management team to control pain. Patient was also seen by gyn physician regarding uterus mass and recommended for outpatient follow up. Patient was also followed by oncology team and radiation medicine team and the plan was to follow up as outpatient after rehab. While on the floor patient had a doppler study of the leg which showed extended dvts was seen by vascular surgery. An IVC filter was placed to prevent PE. Patient was subsequently cleared for full dose anticoagulation and patient was started on sq lovenox. Patient had echo cardiogram while in hospital which was stable Wound care also followed the patient while inhouse. Patient was cleared by physical therapy for discharge. Patient was discharged to rehab with follow up instructions. Patient also had a uto while in hospital and it was treated with abx.

## 2018-10-02 NOTE — PROGRESS NOTE ADULT - SUBJECTIVE AND OBJECTIVE BOX
Vascular Cardiology      PAGER:  [phone]               [facility] [id number]              EMAIL [email or web address]   OFFICE [phone]                          CC:  DVT    INTERVAL HISTORY:  No events.   No CP or SOB.  Legs feel ok.      Allergies    No Known Allergies     MEDICATIONS  (STANDING):  dextrose 50% Injectable 12.5 Gram(s) IV Push once  dextrose 50% Injectable 25 Gram(s) IV Push once  dextrose 50% Injectable 25 Gram(s) IV Push once  enoxaparin Injectable 135 milliGRAM(s) SubCutaneous two times a day  gabapentin 100 milliGRAM(s) Oral three times a day  influenza   Vaccine 0.5 milliLiter(s) IntraMuscular once  insulin lispro (HumaLOG) corrective regimen sliding scale   SubCutaneous three times a day before meals  insulin lispro (HumaLOG) corrective regimen sliding scale   SubCutaneous at bedtime  losartan 50 milliGRAM(s) Oral daily      PAST MEDICAL & SURGICAL HISTORY:  Lumbar spinal stenosis  Prediabetes  Essential (primary) hypertension  S/P right knee arthroscopy  S/P dilation and curettage: Early 2000&#x27;s for DUB, findings benign.  S/P cholecystectomy      FAMILY HISTORY:  Family history of acute myocardial infarction (Mother): mother  Family history of diabetes mellitus (Mother, Sibling)      SOCIAL HISTORY:  unchanged    REVIEW OF SYSTEMS:  CONSTITUTIONAL: No fever, weight loss, or fatigue  EYES: No eye pain, visual disturbances, or discharge  ENMT:  No difficulty hearing, tinnitus, vertigo; No sinus or throat pain  NECK: No pain or stiffness  RESPIRATORY: No cough, wheezing, chills or hemoptysis; No Shortness of Breath  CARDIOVASCULAR: No chest pain, palpitations, passing out, dizziness,   mild LE edema  GASTROINTESTINAL: No abdominal or epigastric pain. No nausea, vomiting, or hematemesis; No diarrhea or constipation. No melena or hematochezia.  GENITOURINARY: No dysuria, frequency, hematuria, or incontinence  NEUROLOGICAL: No headaches, memory loss, loss of strength, numbness, or tremors  SKIN: No itching, burning, rashes, or lesions   LYMPH Nodes: No enlarged glands  ENDOCRINE: No heat or cold intolerance; No hair loss  MUSCULOSKELETAL: No joint pain or swelling; No muscle, back, or extremity pain  PSYCHIATRIC: No depression, anxiety, mood swings, or difficulty sleeping  HEME/LYMPH: No easy bruising, or bleeding gums  ALLERY AND IMMUNOLOGIC: No hives or eczema	    [ x] All others negative	  [ ] Unable to obtain   ICU Vital Signs Last 24 Hrs  T(C): 37.1 (02 Oct 2018 05:17), Max: 37.2 (01 Oct 2018 20:22)  T(F): 98.8 (02 Oct 2018 05:17), Max: 98.9 (01 Oct 2018 20:22)  HR: 90 (02 Oct 2018 05:17) (78 - 93)  BP: 130/71 (02 Oct 2018 05:17) (104/69 - 130/71)  BP(mean): --  ABP: --  ABP(mean): --  RR: 18 (02 Oct 2018 05:17) (18 - 18)  SpO2: 97% (02 Oct 2018 05:17) (96% - 98%)      Appearance: Normal	, overweight  HEENT:   Normal oral mucosa, PERRL, EOMI	  Lymphatic: No lymphadenopathy  Cardiovascular: Normal S1 S2, No JVD, No murmurs, No edema  Respiratory: Lungs clear to auscultation	  Psychiatry: A & O x 3, Mood & affect appropriate  Gastrointestinal:  Soft, Non-tender, + BS	  Skin: No rashes, No ecchymoses, No cyanosis	  Neurologic: weakness  Extremities: Normal range of motion, No clubbing, cyanosis or edema  Vascular: Peripheral pulses palpable 2+ bilaterally      < from: Cardiac Cath Lab - Adult (09.27.18 @ 15:23) >  INTERVENTIONAL RECOMMENDATIONS: Peripheral Diagnostic Summary:  -patent IVC and bilateral renal veins.  -patent right common iliac vein.  Peripheral Interventional Summary:  -Successful IVC filter placement (Celect-Cook).  Plan  -retrieve IVC filter once safely on antiocoagulation.  -FU with Dr. Zach Caldwell in 3-4 weeks.    < end of copied text >  < from: Cardiac Cath Lab - Adult (09.27.18 @ 15:23) >  INTERVENTIONAL RECOMMENDATIONS: Peripheral Diagnostic Summary:  -patent IVC and bilateral renal veins.  -patent right common iliac vein.  Peripheral Interventional Summary:  -Successful IVC filter placement (Celect-Cook).  Plan  -retrieve IVC filter once safely on antiocoagulation.  -FU with Dr. Zach Caldwell in 3-4 weeks.      < end of copied text >

## 2018-10-03 LAB
ANION GAP SERPL CALC-SCNC: 11 MMOL/L — SIGNIFICANT CHANGE UP (ref 5–17)
BUN SERPL-MCNC: 13 MG/DL — SIGNIFICANT CHANGE UP (ref 7–23)
CALCIUM SERPL-MCNC: 9.1 MG/DL — SIGNIFICANT CHANGE UP (ref 8.4–10.5)
CHLORIDE SERPL-SCNC: 104 MMOL/L — SIGNIFICANT CHANGE UP (ref 96–108)
CO2 SERPL-SCNC: 24 MMOL/L — SIGNIFICANT CHANGE UP (ref 22–31)
CREAT SERPL-MCNC: 0.75 MG/DL — SIGNIFICANT CHANGE UP (ref 0.5–1.3)
GLUCOSE BLDC GLUCOMTR-MCNC: 123 MG/DL — HIGH (ref 70–99)
GLUCOSE BLDC GLUCOMTR-MCNC: 129 MG/DL — HIGH (ref 70–99)
GLUCOSE SERPL-MCNC: 141 MG/DL — HIGH (ref 70–99)
HCT VFR BLD CALC: 30.4 % — LOW (ref 34.5–45)
HGB BLD-MCNC: 10.1 G/DL — LOW (ref 11.5–15.5)
MCHC RBC-ENTMCNC: 29.6 PG — SIGNIFICANT CHANGE UP (ref 27–34)
MCHC RBC-ENTMCNC: 33.2 GM/DL — SIGNIFICANT CHANGE UP (ref 32–36)
MCV RBC AUTO: 89 FL — SIGNIFICANT CHANGE UP (ref 80–100)
PLATELET # BLD AUTO: 237 K/UL — SIGNIFICANT CHANGE UP (ref 150–400)
POTASSIUM SERPL-MCNC: 3.8 MMOL/L — SIGNIFICANT CHANGE UP (ref 3.5–5.3)
POTASSIUM SERPL-SCNC: 3.8 MMOL/L — SIGNIFICANT CHANGE UP (ref 3.5–5.3)
RBC # BLD: 3.42 M/UL — LOW (ref 3.8–5.2)
RBC # FLD: 15.3 % — HIGH (ref 10.3–14.5)
SODIUM SERPL-SCNC: 139 MMOL/L — SIGNIFICANT CHANGE UP (ref 135–145)
WBC # BLD: 11.3 K/UL — HIGH (ref 3.8–10.5)
WBC # FLD AUTO: 11.3 K/UL — HIGH (ref 3.8–10.5)

## 2018-10-03 PROCEDURE — 80053 COMPREHEN METABOLIC PANEL: CPT

## 2018-10-03 PROCEDURE — 37225: CPT

## 2018-10-03 PROCEDURE — 80048 BASIC METABOLIC PNL TOTAL CA: CPT

## 2018-10-03 PROCEDURE — 97530 THERAPEUTIC ACTIVITIES: CPT

## 2018-10-03 PROCEDURE — 86901 BLOOD TYPING SEROLOGIC RH(D): CPT

## 2018-10-03 PROCEDURE — 85027 COMPLETE CBC AUTOMATED: CPT

## 2018-10-03 PROCEDURE — 86850 RBC ANTIBODY SCREEN: CPT

## 2018-10-03 PROCEDURE — 88307 TISSUE EXAM BY PATHOLOGIST: CPT

## 2018-10-03 PROCEDURE — 88341 IMHCHEM/IMCYTCHM EA ADD ANTB: CPT

## 2018-10-03 PROCEDURE — 83735 ASSAY OF MAGNESIUM: CPT

## 2018-10-03 PROCEDURE — C1713: CPT

## 2018-10-03 PROCEDURE — 86900 BLOOD TYPING SEROLOGIC ABO: CPT

## 2018-10-03 PROCEDURE — 36430 TRANSFUSION BLD/BLD COMPNT: CPT

## 2018-10-03 PROCEDURE — 97166 OT EVAL MOD COMPLEX 45 MIN: CPT

## 2018-10-03 PROCEDURE — 82378 CARCINOEMBRYONIC ANTIGEN: CPT

## 2018-10-03 PROCEDURE — 97606 NEG PRS WND THER DME>50 SQCM: CPT

## 2018-10-03 PROCEDURE — 97110 THERAPEUTIC EXERCISES: CPT

## 2018-10-03 PROCEDURE — 71045 X-RAY EXAM CHEST 1 VIEW: CPT

## 2018-10-03 PROCEDURE — 97116 GAIT TRAINING THERAPY: CPT

## 2018-10-03 PROCEDURE — 37243 VASC EMBOLIZE/OCCLUDE ORGAN: CPT

## 2018-10-03 PROCEDURE — 75716 ARTERY X-RAYS ARMS/LEGS: CPT

## 2018-10-03 PROCEDURE — 76000 FLUOROSCOPY <1 HR PHYS/QHP: CPT

## 2018-10-03 PROCEDURE — P9041: CPT

## 2018-10-03 PROCEDURE — P9016: CPT

## 2018-10-03 PROCEDURE — 83036 HEMOGLOBIN GLYCOSYLATED A1C: CPT

## 2018-10-03 PROCEDURE — 37191 INS ENDOVAS VENA CAVA FILTR: CPT

## 2018-10-03 PROCEDURE — C1887: CPT

## 2018-10-03 PROCEDURE — C8929: CPT

## 2018-10-03 PROCEDURE — 72131 CT LUMBAR SPINE W/O DYE: CPT

## 2018-10-03 PROCEDURE — C2628: CPT

## 2018-10-03 PROCEDURE — 88311 DECALCIFY TISSUE: CPT

## 2018-10-03 PROCEDURE — 93970 EXTREMITY STUDY: CPT

## 2018-10-03 PROCEDURE — 86301 IMMUNOASSAY TUMOR CA 19-9: CPT

## 2018-10-03 PROCEDURE — 81001 URINALYSIS AUTO W/SCOPE: CPT

## 2018-10-03 PROCEDURE — 97162 PT EVAL MOD COMPLEX 30 MIN: CPT

## 2018-10-03 PROCEDURE — C1889: CPT

## 2018-10-03 PROCEDURE — C1880: CPT

## 2018-10-03 PROCEDURE — 87186 SC STD MICRODIL/AGAR DIL: CPT

## 2018-10-03 PROCEDURE — 87086 URINE CULTURE/COLONY COUNT: CPT

## 2018-10-03 PROCEDURE — 82962 GLUCOSE BLOOD TEST: CPT

## 2018-10-03 PROCEDURE — 85610 PROTHROMBIN TIME: CPT

## 2018-10-03 PROCEDURE — A9585: CPT

## 2018-10-03 PROCEDURE — C1894: CPT

## 2018-10-03 PROCEDURE — 86923 COMPATIBILITY TEST ELECTRIC: CPT

## 2018-10-03 PROCEDURE — 88342 IMHCHEM/IMCYTCHM 1ST ANTB: CPT

## 2018-10-03 PROCEDURE — 72158 MRI LUMBAR SPINE W/O & W/DYE: CPT

## 2018-10-03 PROCEDURE — 72196 MRI PELVIS W/DYE: CPT

## 2018-10-03 PROCEDURE — C1769: CPT

## 2018-10-03 PROCEDURE — 84443 ASSAY THYROID STIM HORMONE: CPT

## 2018-10-03 PROCEDURE — 85730 THROMBOPLASTIN TIME PARTIAL: CPT

## 2018-10-03 PROCEDURE — 36245 INS CATH ABD/L-EXT ART 1ST: CPT

## 2018-10-03 PROCEDURE — 84100 ASSAY OF PHOSPHORUS: CPT

## 2018-10-03 PROCEDURE — 86304 IMMUNOASSAY TUMOR CA 125: CPT

## 2018-10-03 RX ORDER — FAMOTIDINE 10 MG/ML
1 INJECTION INTRAVENOUS
Qty: 0 | Refills: 0 | COMMUNITY
Start: 2018-10-03

## 2018-10-03 RX ORDER — LOSARTAN POTASSIUM 100 MG/1
1 TABLET, FILM COATED ORAL
Qty: 0 | Refills: 0 | COMMUNITY
Start: 2018-10-03

## 2018-10-03 RX ORDER — HYDROMORPHONE HYDROCHLORIDE 2 MG/ML
1 INJECTION INTRAMUSCULAR; INTRAVENOUS; SUBCUTANEOUS
Qty: 0 | Refills: 0 | COMMUNITY
Start: 2018-10-03

## 2018-10-03 RX ORDER — ENOXAPARIN SODIUM 100 MG/ML
140 INJECTION SUBCUTANEOUS
Qty: 0 | Refills: 0 | COMMUNITY
Start: 2018-10-03

## 2018-10-03 RX ORDER — GABAPENTIN 400 MG/1
1 CAPSULE ORAL
Qty: 0 | Refills: 0 | COMMUNITY
Start: 2018-10-03

## 2018-10-03 RX ORDER — CYCLOBENZAPRINE HYDROCHLORIDE 10 MG/1
1 TABLET, FILM COATED ORAL
Qty: 0 | Refills: 0 | COMMUNITY
Start: 2018-10-03

## 2018-10-03 RX ORDER — LOSARTAN POTASSIUM 100 MG/1
1 TABLET, FILM COATED ORAL
Qty: 0 | Refills: 0 | COMMUNITY

## 2018-10-03 RX ORDER — ENOXAPARIN SODIUM 100 MG/ML
140 INJECTION SUBCUTANEOUS
Qty: 0 | Refills: 0 | Status: DISCONTINUED | OUTPATIENT
Start: 2018-10-03 | End: 2018-10-03

## 2018-10-03 RX ORDER — ACETAMINOPHEN 500 MG
2 TABLET ORAL
Qty: 0 | Refills: 0 | COMMUNITY
Start: 2018-10-03

## 2018-10-03 RX ADMIN — HYDROMORPHONE HYDROCHLORIDE 4 MILLIGRAM(S): 2 INJECTION INTRAMUSCULAR; INTRAVENOUS; SUBCUTANEOUS at 14:08

## 2018-10-03 RX ADMIN — ENOXAPARIN SODIUM 135 MILLIGRAM(S): 100 INJECTION SUBCUTANEOUS at 05:32

## 2018-10-03 RX ADMIN — GABAPENTIN 100 MILLIGRAM(S): 400 CAPSULE ORAL at 14:07

## 2018-10-03 RX ADMIN — GABAPENTIN 100 MILLIGRAM(S): 400 CAPSULE ORAL at 05:31

## 2018-10-03 RX ADMIN — LOSARTAN POTASSIUM 50 MILLIGRAM(S): 100 TABLET, FILM COATED ORAL at 05:32

## 2018-10-03 NOTE — PROGRESS NOTE ADULT - SUBJECTIVE AND OBJECTIVE BOX
8.9    12.5  )-----------( 230      ( 01 Oct 2018 10:38 )             27.3   Plastic Surgery Progress Note    Subjective: Patient examined at bedside. Patient doing well, no complaints at this time.       Objective:    Vital Signs Last 24 Hrs  T(C): 36.7 (03 Oct 2018 04:54), Max: 36.9 (02 Oct 2018 23:45)  T(F): 98 (03 Oct 2018 04:54), Max: 98.5 (02 Oct 2018 23:45)  HR: 90 (03 Oct 2018 04:54) (82 - 128)  BP: 137/61 (03 Oct 2018 04:54) (106/68 - 137/61)  BP(mean): --  RR: 18 (03 Oct 2018 04:54) (18 - 18)  SpO2: 97% (03 Oct 2018 04:54) (96% - 99%)    I&O's Summary    02 Oct 2018 07:01  -  03 Oct 2018 07:00  --------------------------------------------------------  IN: 730 mL / OUT: 1850 mL / NET: -1120 mL      Exam:  General: NAD  Respiratory: Breathing comfortably  Cardiovascular: NSR  Back: soft, dressing in place c/d/i.  No collections                            8.9    12.5  )-----------( 230      ( 01 Oct 2018 10:38 )             27.3   10-02    136  |  101  |  16  ----------------------------<  147<H>  3.9   |  28  |  0.79    Ca    8.9      02 Oct 2018 11:33    MEDICATIONS  (STANDING):  dextrose 50% Injectable 12.5 Gram(s) IV Push once  dextrose 50% Injectable 25 Gram(s) IV Push once  dextrose 50% Injectable 25 Gram(s) IV Push once  enoxaparin Injectable 135 milliGRAM(s) SubCutaneous two times a day  gabapentin 100 milliGRAM(s) Oral three times a day  influenza   Vaccine 0.5 milliLiter(s) IntraMuscular once  insulin lispro (HumaLOG) corrective regimen sliding scale   SubCutaneous three times a day before meals  insulin lispro (HumaLOG) corrective regimen sliding scale   SubCutaneous at bedtime  losartan 50 milliGRAM(s) Oral daily    MEDICATIONS  (PRN):  acetaminophen   Tablet .. 650 milliGRAM(s) Oral every 6 hours PRN Temp greater or equal to 38C (100.4F), Mild Pain (1 - 3)  cyclobenzaprine 5 milliGRAM(s) Oral three times a day PRN Muscle Spasm  dextrose 40% Gel 15 Gram(s) Oral once PRN Blood Glucose LESS THAN 70 milliGRAM(s)/deciliter  diphenhydrAMINE   Capsule 25 milliGRAM(s) Oral every 24 hours PRN Insomnia  famotidine    Tablet 20 milliGRAM(s) Oral daily PRN gerd  glucagon  Injectable 1 milliGRAM(s) IntraMuscular once PRN Glucose LESS THAN 70 milligrams/deciliter  HYDROmorphone   Tablet 2 milliGRAM(s) Oral every 4 hours PRN Moderate Pain (4 - 6)  HYDROmorphone   Tablet 4 milliGRAM(s) Oral every 4 hours PRN Severe Pain (7 - 10)  naloxone Injectable 0.1 milliGRAM(s) IV Push every 3 minutes PRN For ANY of the following changes in patient status:  A. RR LESS THAN 10 breaths per minute, B. Oxygen saturation LESS THAN 90%, C. Sedation score of 6  ondansetron Injectable 4 milliGRAM(s) IV Push every 6 hours PRN Nausea and/or Vomiting

## 2018-10-03 NOTE — PROGRESS NOTE ADULT - PROVIDER SPECIALTY LIST ADULT
Anesthesia
Heme/Onc
Heme/Onc
Hospitalist
NSICU
Neurosurgery
Palliative Care
Plastic Surgery
Rehab Medicine
Vascular Cardiology
Neurosurgery
Plastic Surgery
Neurosurgery
Neurosurgery
Palliative Care
Hospitalist
Hospitalist
Palliative Care

## 2018-10-03 NOTE — PROVIDER CONTACT NOTE (OTHER) - ACTION/TREATMENT ORDERED:
Waldo Jama aware of situation pt. most likely has indigestion and does not believe this is cardiac related, will order pepcid. pt. feels more comfortable now w/o receiving pepcid.
As per Waldo Jama, NSX will reassess BP in 1 hour after giving Losartan 50mg and Dilaudid 4mg PO
MD to order straight cath x1. will monitor.

## 2018-10-03 NOTE — PROGRESS NOTE ADULT - SUBJECTIVE AND OBJECTIVE BOX
SUBJECTIVE: Patient was seen and evaluated at bedside. Patient is resting in bed and is in no new acute distress.     OVERNIGHT EVENTS: none     Vital Signs Last 24 Hrs  T(C): 36.8 (03 Oct 2018 08:00), Max: 36.9 (02 Oct 2018 23:45)  T(F): 98.2 (03 Oct 2018 08:00), Max: 98.5 (02 Oct 2018 23:45)  HR: 88 (03 Oct 2018 08:00) (82 - 128)  BP: 128/70 (03 Oct 2018 08:00) (106/68 - 137/61)  BP(mean): --  RR: 18 (03 Oct 2018 08:00) (18 - 18)  SpO2: 95% (03 Oct 2018 08:00) (95% - 99%)    PHYSICAL EXAM:    General: No Acute Distress     Neurological: Awake, alert oriented to person, place and time, Following Commands, PERRL, EOMI, Face Symmetrical, Speech Fluent, Moving all extremities, Muscle Strength- lle proximal 2/5 and distal 4/5 , right lower 3/5 proximal distal 4+/5 No Drift, Sensation to Light Touch Intact      Pulmonary: Clear to Auscultation, No Rales, No Rhonchi, No Wheezes     Cardiovascular: S1, S2, Regular Rate and Rhythm     Gastrointestinal: Soft, Nontender, Nondistended     Incision: clean and dry     LABS:                        10.1   11.3  )-----------( 237      ( 03 Oct 2018 10:13 )             30.4    10-03    139  |  104  |  13  ----------------------------<  141<H>  3.8   |  24  |  0.75    Ca    9.1      03 Oct 2018 10:00      Hemoglobin A1C, Whole Blood: 6.1 % (09-13 @ 08:06)      10-02 @ 07:01  -  10-03 @ 07:00  --------------------------------------------------------  IN: 730 mL / OUT: 1850 mL / NET: -1120 mL      DRAINS:     MEDICATIONS:  Antibiotics:    Neuro:  acetaminophen   Tablet .. 650 milliGRAM(s) Oral every 6 hours PRN Temp greater or equal to 38C (100.4F), Mild Pain (1 - 3)  cyclobenzaprine 5 milliGRAM(s) Oral three times a day PRN Muscle Spasm  diphenhydrAMINE   Capsule 25 milliGRAM(s) Oral every 24 hours PRN Insomnia  gabapentin 100 milliGRAM(s) Oral three times a day  HYDROmorphone   Tablet 2 milliGRAM(s) Oral every 4 hours PRN Moderate Pain (4 - 6)  HYDROmorphone   Tablet 4 milliGRAM(s) Oral every 4 hours PRN Severe Pain (7 - 10)  ondansetron Injectable 4 milliGRAM(s) IV Push every 6 hours PRN Nausea and/or Vomiting    Cardiac:  losartan 50 milliGRAM(s) Oral daily    Pulm:    GI/:  famotidine    Tablet 20 milliGRAM(s) Oral daily PRN gerd    Other:   dextrose 40% Gel 15 Gram(s) Oral once PRN Blood Glucose LESS THAN 70 milliGRAM(s)/deciliter  dextrose 50% Injectable 12.5 Gram(s) IV Push once  dextrose 50% Injectable 25 Gram(s) IV Push once  dextrose 50% Injectable 25 Gram(s) IV Push once  enoxaparin Injectable 140 milliGRAM(s) SubCutaneous two times a day  glucagon  Injectable 1 milliGRAM(s) IntraMuscular once PRN Glucose LESS THAN 70 milligrams/deciliter  influenza   Vaccine 0.5 milliLiter(s) IntraMuscular once  insulin lispro (HumaLOG) corrective regimen sliding scale   SubCutaneous three times a day before meals  insulin lispro (HumaLOG) corrective regimen sliding scale   SubCutaneous at bedtime  naloxone Injectable 0.1 milliGRAM(s) IV Push every 3 minutes PRN For ANY of the following changes in patient status:  A. RR LESS THAN 10 breaths per minute, B. Oxygen saturation LESS THAN 90%, C. Sedation score of 6    DIET: [] Regular [] CCD [] Renal [] Puree [] Dysphagia [] Tube Feeds: ccd diet     IMAGING: no new imaging today

## 2018-10-03 NOTE — PROVIDER CONTACT NOTE (OTHER) - ASSESSMENT
VSS. pt woke up and complained of pain/tenderness to upper middle chest when pressing. pt. states I think if I was repositioned it will help. repositioned pt and she reports relief. pt ate dinner not sitting completely upright and had indigestion.

## 2018-10-03 NOTE — PROGRESS NOTE ADULT - ASSESSMENT
73F hx HTN, prediabetes, spinal stenosis 2/2 metastatic lesion now POD#14 s/p T12-L4 fusion and plastics closure (9/18)    - Next aquacel change 10/7  - Rest of care per primary team    Plastic Surgery  [phone]

## 2018-10-03 NOTE — PROGRESS NOTE ADULT - ASSESSMENT
HPI:  Ms. Son is a 72 y/o F with HTN, prediabetes and chronic back pain 2/2 spinal stenosis p/w worsening of chronic back pain.  Pt reports pain in lower back with R sided sciatica since early 2017.  She had imaging in 2017 which revealed lumbar spinal stenosis.  She had been prescribed gabapentin as well as percocet which she only needed sparingly.  Pt recently has developed worsening lower back pain with paresthesia radiating to L thigh.  Pain makes it difficult to walk, and she feels that her L leg is weaker.  Pt has been unable to lay flat, or stand 2/2 the pain and has spent most of her time sitting.  She has been taking percocet without relief.  She has chronic incontinence, but no new symptoms.  She has been constipated which she attributes to taking percocet more frequently.  She has not had foot numbness or saddle anesthesia.  She had imaging done 2 weeks ago including MRI which showed an infiltrative process at L2 vertebral body, lumbar spinal stenosis and RP soft tissue mass.  CT a/p showed signs suspicious of uterine ca with mets to bone and lung.  Pt was referred to an oncologist with appointment scheduled later this week, but due to increasing severity of pain, she was advised to come to the ED by her PCP.       At [facility] patient receieved CT guided biopsy. Tx to [facility] for further work up and management of spinal lesion (12 Sep 2018 19:54)    PROCEDURE: Muscle flap closure  Corpectomy of single segment of lumbar spine for excision of intraspinal lesion-l2 corpectomy and t11-l4 fusion-9/18      POD# 15     PLAN:  1 Out of bed   2 continue PT   3 PRN pain meds   4 dvt ppx on full dose AC   5 continue losartan   6 awaiting rehab  7 rehab today     Assessment:  Please Check When Present   []  GCS  E   V  M     Heart Failure: []Acute, [] acute on chronic , []chronic  Heart Failure:  [] Diastolic (HFpEF), [] Systolic (HFrEF), []Combined (HFpEF and HFrEF), [] RHF, [] Pulm HTN, [] Other    [] CANDIE, [] ATN, [] AIN, [] other  [] CKD1, [] CKD2, [] CKD 3, [] CKD 4, [] CKD 5, []ESRD    Encephalopathy: [] Metabolic, [] Hepatic, [] toxic, [] Neurological, [] Other    Abnormal Nurtitional Status: [] malnurtition (see nutrition note), [ ]underweight: BMI < 19, [] morbid obesity: BMI >40, [] Cachexia    [] Sepsis  [] hypovolemic shock,[] cardiogenic shock, [] hemorrhagic shock, [] neuogenic shock  [] Acute Respiratory Failure  []Cerebral edema, [] Brain compression/ herniation,   [] Functional quadriplegia  [] Acute blood loss anemia

## 2018-10-03 NOTE — PROGRESS NOTE ADULT - REASON FOR ADMISSION
L2 lesion
L2 lesion with back pain and lower extremity weakness.
L2 lesion
L2 lesion with back pain and lower extremity weakness.
increasing back pain
L2 lesion

## 2018-10-04 VITALS
RESPIRATION RATE: 18 BRPM | TEMPERATURE: 98 F | OXYGEN SATURATION: 95 % | HEART RATE: 82 BPM | SYSTOLIC BLOOD PRESSURE: 126 MMHG | DIASTOLIC BLOOD PRESSURE: 69 MMHG

## 2018-10-19 ENCOUNTER — OUTPATIENT (OUTPATIENT)
Dept: OUTPATIENT SERVICES | Facility: HOSPITAL | Age: 73
LOS: 1 days | Discharge: HOME | End: 2018-10-19

## 2018-10-19 DIAGNOSIS — Z90.49 ACQUIRED ABSENCE OF OTHER SPECIFIED PARTS OF DIGESTIVE TRACT: Chronic | ICD-10-CM

## 2018-10-19 DIAGNOSIS — Z98.890 OTHER SPECIFIED POSTPROCEDURAL STATES: Chronic | ICD-10-CM

## 2018-10-19 DIAGNOSIS — C54.1 MALIGNANT NEOPLASM OF ENDOMETRIUM: ICD-10-CM

## 2018-10-22 PROBLEM — R19.00 PELVIC MASS IN FEMALE: Status: ACTIVE | Noted: 2018-10-22

## 2018-10-24 ENCOUNTER — APPOINTMENT (OUTPATIENT)
Dept: HEMATOLOGY ONCOLOGY | Facility: CLINIC | Age: 73
End: 2018-10-24
Payer: MEDICARE

## 2018-10-24 ENCOUNTER — OTHER (OUTPATIENT)
Age: 73
End: 2018-10-24

## 2018-10-24 VITALS
TEMPERATURE: 96.8 F | OXYGEN SATURATION: 97 % | DIASTOLIC BLOOD PRESSURE: 58 MMHG | HEART RATE: 108 BPM | RESPIRATION RATE: 16 BRPM | HEIGHT: 67 IN | SYSTOLIC BLOOD PRESSURE: 88 MMHG

## 2018-10-24 DIAGNOSIS — R19.00 INTRA-ABDOMINAL AND PELVIC SWELLING, MASS AND LUMP, UNSPECIFIED SITE: ICD-10-CM

## 2018-10-24 DIAGNOSIS — Z87.891 PERSONAL HISTORY OF NICOTINE DEPENDENCE: ICD-10-CM

## 2018-10-24 DIAGNOSIS — C54.1 MALIGNANT NEOPLASM OF ENDOMETRIUM: ICD-10-CM

## 2018-10-24 PROCEDURE — 99214 OFFICE O/P EST MOD 30 MIN: CPT

## 2018-10-26 ENCOUNTER — OTHER (OUTPATIENT)
Age: 73
End: 2018-10-26

## 2018-10-29 PROBLEM — C54.1 ENDOMETRIAL CANCER, FIGO STAGE IVB: Status: ACTIVE | Noted: 2018-10-29

## 2018-10-29 PROBLEM — Z87.891 FORMER SMOKER: Status: ACTIVE | Noted: 2018-10-29

## 2020-07-06 NOTE — DISCHARGE NOTE ADULT - BECAUSE OF A PHYSICAL, MENTAL OR EMOTIONAL CONDITION, DO YOU HAVE DIFFICULTY DOING  ERRANDS ALONE LIKE VISITING A DOCTOR'S OFFICE OR SHOPPING (15 YEARS AND OLDER)
Take all medications as prescribed.  Follow-up with your primary care provider as discussed.  Please remember that you had a visit to the emergency room today and this does not substitute as primary care services for ongoing management because emergency services is a snap shot in time.  Should you have any worsening condition that requires emergency services do not hesitate to return to the ER.    COVID-19 TESTING  IF YOU DESIRE TO BE TESTED, CALL TO SCHEDULE AN APPOINTMENT:  Hot Line [phone]  [address] [place] [address], [place] [id number]  [facility]  Hours 8am-5pm Monday Through Friday    
Yes

## 2021-07-28 NOTE — CONSULT NOTE ADULT - NEGATIVE ENMT SYMPTOMS
58750 Comprehensive no throat pain/no nasal discharge/no ear pain 44176 Critical Care - 30 to 74 minutes

## 2022-04-21 NOTE — PROGRESS NOTE ADULT - ASSESSMENT
Replied to patient via mychart with MRI results   Ms. Son is a 72 y/o F with HTN, prediabetes and chronic back pain 2/2 spinal stenosis p/w worsening of chronic back pain.  Pt reports pain in lower back with R sided sciatica since early 2017.  She had imaging in 2017 which revealed lumbar spinal stenosis.  She had been prescribed gabapentin as well as percocet which she only needed sparingly.  Pt recently has developed worsening lower back pain with paresthesia radiating to L thigh.  Pain makes it difficult to walk, and she feels that her L leg is weaker.  Pt has been unable to lay flat, or stand 2/2 the pain and has spent most of her time sitting.  She has been taking percocet without relief.  She has chronic incontinence, but no new symptoms.  She has been constipated which she attributes to taking percocet more frequently.  She has not had foot numbness or saddle anesthesia.  She had imaging done 2 weeks ago including MRI which showed an infiltrative process at L2 vertebral body, lumbar spinal stenosis and RP soft tissue mass.  CT a/p showed signs suspicious of uterine ca with mets to bone and lung.  Pt was referred to an oncologist with appointment scheduled later this week, but due to increasing severity of pain, she was advised to come to the ED by her PCP.       At [facility] patient receieved CT guided biopsy. Tx to [facility] for further work up and management of spinal lesion (12 Sep 2018 19:54)    PROCEDURE:  Adm 9/13 L2 Lytic lesion.  s/p biopsy of L2 Lesion at [facility] on 9/12  POD#n/a    PLAN:  Neuro: Spine precautions, BR, but may be OOB to chair w/assist and back brace on. FU Biopsy results from [facility].  Preop for Angio 9/17 and OR 9/18.  NPO MN, Hold AC. IVF. MRI pelvis with contrast ordered-need to sched w/anesth- (if no contraindication) to better characterize the RP mass FU.  Recall GYN in AM to FU.    Gyn saw pt at [facility] and will continue to follow, awaiting pathology results, (pager [phone]), Recall GYN 9/17 to FU.     Hemo/Onco-1. Pain secondary infiltrating L2 vertebral body mass. appreciate neurosurgical care, being considered for a resection of the mass. follow up pathology results. if not surgical candidate would consider radiation to the lesion. 2. Rule out malignancy. imaging is concerning for sarcoma versus metastatic endometrial cancer  MRI pelvis with contrast (if no contraindication) to better characterize the RP mass.  GYN consult for endometrial mass biopsy, it is possible there are two processes with sarcoma and endometrial cancer. send , CEA, CA 19-9 with next blood draw. Please call oncology fellow if any questions or concerns. Waqas Dan PGY-6, Hematology-Oncology Fellow [phone] ([place]) [phone] ([facility])    Medicine/Hosp-p/w worsening of chronic back pain, imaging suspicious for uterine ca with mets to spine and lung, transferred here for neurosurgery eval for L2 lytic lesion. Problem: Bone lesion.  Plan: -likely neoplasm related pain - metastatic bone lesion of L2 causing severe lower back pain. Palliative care following to help with pain management. On Dilaudid IV/PO PRN. Decadron 4mg IV q6h as per neurosurgery. Angiogram planned for tomorrow, surgery possibly on Tuesday. Problem: Essential (primary) hypertension.  Plan: BP generally at goal (except during episodes of pain), Continue Losartan. Problem: Malignant neoplasm of uterus, unspecified site.  Plan: Outpatient CT suggest uterine mass w/ mets to spine and lung. S/p CT guided biopsy of paraspinal mass at L2. Follow up pathology results (likely early this week).  GYN consult pending- may require biopsy of endometrial mass. Onc input appreciated, radiation if not resectable. Elevation of:  (48), CEA (20.5), CA 19-9 (193.3).   Problem: Cellulitis, unspecified cellulitis site.  Plan: Cellulitis of LLE diagnosed at [facility] (improving on antibiotics). Continue Ancef for 7 days total (s/p 4 days)  Blood cultures NGTD. LE dopplers negative for DVT. Problem: Prediabetes.  Plan: Hgb A1c 6.1. Continue insulin sliding scale. Monitor fingersticks closely while on steroids. Problem: Pre-op evaluation. Plan: RCRI of 0, 0.4% risk of major cardiac event. EKG normal, METS > 4 and no cardiac symptoms. No further w/up necessary, may proceed to OR as scheduled.Problem: Morbid obesity.  Plan: BMI 47- nutrition eval. TSH normal.     Palliative Care-spinal stenosis, chronic back pain here with worsening pain. Pain started in early 2017 with R-sided sciatica, resulting in diagnosis of lumbar spinal stenosis, and has used gabapentin and percocet at home. Pain subsequently worsened, causing difficulty walking and difficulty being supine, and now has parasthesias radiating to her L thigh. Also with constipation. CT A/P showed concern for new metastatic uterine CA to bone and lung, and came to [facility] from [facility] for management of her spinal lesion. Problem: Lumbar spinal stenosis.  Plan: - cause of baseline chronic back pain, worsened now due to L2 lytic lesion. Problem: Neoplasm.  Plan: - likely uterine cancer. awaiting biopsy results from [facility], pre-op for Angio Monday for L2 lesion. Problem: Chronic low back pain, unspecified back pain laterality, with sciatica presence unspecified.  Plan: - states dilaudid 2mg/4mg PO with 1mg IV for breakthrough is working currently, continue to monitor, - bowel regimen.   Problem: Encounter for palliative care.  Plan: Will continue to follow for pain.  Problem: Moderate protein-calorie malnutrition.     Respiratory: Patient instructed to use incentive spirometer [ ] YES [ X] NO              DVT ppx: [X ] SQL-Hold for Angio 9/17 [ ] SQH and Venodynes [ ] Left [ ] Right [ X] Bilateral    Discharge Planning: PT eval postop.    Assessment:  Please Check When Present   []  GCS  E   V  M     Heart Failure: []Acute, [] acute on chronic , []chronic  Heart Failure:  [] Diastolic (HFpEF), [] Systolic (HFrEF), []Combined (HFpEF and HFrEF), [] RHF, [] Pulm HTN, [] Other    [] CANDIE, [] ATN, [] AIN, [] other  [] CKD1, [] CKD2, [] CKD 3, [] CKD 4, [] CKD 5, []ESRD    Encephalopathy: [] Metabolic, [] Hepatic, [] toxic, [] Neurological, [] Other    Abnormal Nurtitional Status: [] malnurtition (see nutrition note), [ ]underweight: BMI < 19, [] morbid obesity: BMI >40, [] Cachexia    [] Sepsis  [] hypovolemic shock,[] cardiogenic shock, [] hemorrhagic shock, [] neuogenic shock  [] Acute Respiratory Failure  []Cerebral edema, [] Brain compression/ herniation,   [] Functional quadriplegia  [] Acute blood loss anemia

## 2023-03-15 NOTE — CHART NOTE - NSCHARTNOTEFT_GEN_A_CORE
Interventional Neuro- Radiology   Procedure Note PA-C    Procedure: Selective Spinal Angiography   Pre- Procedure Diagnosis: L2 spinal lesion   Post- Procedure Diagnosis:    : Dr Lawson Castillo  Fellow:    Dr Alice Carey     Physician Assistant: Niocle Wallace PA-C  Radiologic Tech:          Gustavo Sylvester LRT  Nurse:                     Bree Henning RN  Anesthesiologist:      Dr Keith Ceballos   Sheath:                    5 Sammarinese     I/Os:  Estimated blood loss less than 10cc IV fluids:     cc  Urine output     cc  Contrast  cc           Vitals: BP        HR     Spo2      Preliminary Report:  Using a 5 Sammarinese sheath to the right groin under MAC sedation via   a selective cerebral angiography was performed and  demonstrated                                                official note to follow.   Patient tolerated procedure well, hemodynamically stable, no change in neurological status compared to baseline.  Results discussed with neurosurgery, patient and patient's  family.  Groin sheath was removed,  manual compression held to hemostasis  for  21 minutes, no active bleeding, no hematoma, quick clot and safeguard balloon dressing applied at _____h. Patient transported to Recovery Room Interventional Neuro- Radiology   Procedure Note PA-C    Procedure: Selective Spinal Angiography   Pre- Procedure Diagnosis: L2 spinal lesion   Post- Procedure Diagnosis:    : Dr Lawson Castillo  Fellow:    Dr Alice Carey     Physician Assistant: Nicole Wallace PA-C  Radiologic Tech:        Gustavo Sylvester LRT  Nurse:                      Bree Henning RN  Anesthesiologist:      Dr Keith Ceballos   Sheath:                    5 Cymraes     I/Os:  Estimated blood loss less than 10cc IV fluids:     cc  Urine output     cc  Contrast  cc           Vitals: BP        HR     Spo2      Preliminary Report:  Using a 5 Cymraes sheath to the right groin under MAC sedation via   a selective cerebral angiography was performed and  demonstrated                                                official note to follow.   Patient tolerated procedure well, hemodynamically stable, no change in neurological status compared to baseline.  Results discussed with neurosurgery, patient and patient's  family.  Groin sheath was removed,  manual compression held to hemostasis  for  21 minutes, no active bleeding, no hematoma, quick clot and safeguard balloon dressing applied at _____h. Patient transported to Recovery Room Interventional Neuro- Radiology   Procedure Note PA-C    Procedure: Selective Spinal Angiography   Pre- Procedure Diagnosis: L2 spinal lesion   Post- Procedure Diagnosis:    : Dr Lawson Castillo  Fellow:    Dr Alice Carey     Physician Assistant: Nicole Wallace PA-C  Radiologic Tech:      Gustavo Sylvester LRT  Nurse:                      Bree Henning RN  Anesthesiologist:       Dr Keith Ceballos   Sheath:                     5 Sri Lankan     I/Os:  Estimated blood loss less than 10cc IV fluids:     cc  Urine output     cc  Contrast  cc           Vitals: BP        HR     Spo2      Preliminary Report:  Using a 5 Sri Lankan sheath to the right groin under MAC sedation via   a selective cerebral angiography was performed and  demonstrated                                                official note to follow.   Patient tolerated procedure well, hemodynamically stable, no change in neurological status compared to baseline.  Results discussed with neurosurgery, patient and patient's  family.  Groin sheath was removed,  manual compression held to hemostasis  for  21 minutes, no active bleeding, no hematoma, quick clot and safeguard balloon dressing applied at _____h. Patient transported to Recovery Room Interventional Neuro- Radiology   Procedure Note PA-C    Procedure: Selective Spinal Angiography   Pre- Procedure Diagnosis: L2 spinal lesion   Post- Procedure Diagnosis:    : Dr Lawson Castillo  Fellow:    Dr Alice Carey     Physician Assistant: Nicole Wallace PA-C  Radiologic Tech:       Gustavo Sylvester LRT  Nurse:                       Bree Henning RN  Anesthesiologist:        Dr Keith Ceballos   Sheath:                     5 Moroccan     I/Os:  Estimated blood loss less than 10cc IV fluids:     cc  Urine output     cc  Contrast  cc           Vitals: BP        HR     Spo2      Preliminary Report:  Using a 5 Moroccan sheath to the right groin under MAC sedation via   a selective cerebral angiography was performed and  demonstrated                                                official note to follow.   Patient tolerated procedure well, hemodynamically stable, no change in neurological status compared to baseline.  Results discussed with neurosurgery, patient and patient's  family.  Groin sheath was removed,  manual compression held to hemostasis  for  21 minutes, no active bleeding, no hematoma, quick clot and safeguard balloon dressing applied at _____h. Patient transported to Recovery Room Interventional Neuro- Radiology   Procedure Note PA-C    Procedure: Selective Spinal Angiography   Pre- Procedure Diagnosis: L2 spinal lesion   Post- Procedure Diagnosis:    : Dr Lawson Castillo  Fellow:    Dr Alice Carey   Resident: Dr Patito Aleman    Physician Assistant: Nicole Wallace PA-C  Radiologic Tech:       Gustavo Sylvester LRT  Nurse:                       Bere Henning RN  Anesthesiologist:        Dr Keith Ceballos   Sheath:                      5 Welsh short sheath     I/Os: EBL less than 10cc IV fluids:     cc Urine output     cc Contrast  cc           Vitals: /75     HR  52     Spo2 99%     Preliminary Report:  Using a 5 Welsh short sheath to the right groin under MAC sedation via a selective spinal angiography L1 to was performed and demonstrated                                                official note to follow.   Patient tolerated procedure well, hemodynamically stable, no change in neurological status compared to baseline. Results discussed with neurosurgery and patient.  Right groin sheath was removed, manual compression held to hemostasis for 21 minutes, no active bleeding, no hematoma, quick clot and safeguard balloon dressing applied at  Patient transported to Recovery Room Interventional Neuro- Radiology   Procedure Note PA-C    Procedure: Selective Spinal Angiography   Pre- Procedure Diagnosis: L2 spinal lesion   Post- Procedure Diagnosis:    : Dr Lawson Castillo  Fellow:     Dr Alice Carey   Resident: Dr Patito Aleman    Physician Assistant: Nicole Wlalace PA-C  Radiologic Tech:        Gustavo Sylvester LRT  Nurse:                        Bree Henning RN  Anesthesiologist:         Dr Keith Ceballos   Sheath:                       5 Tam[place] short sheath     I/Os: EBL less than 10cc IV fluids:     cc Urine output     cc Contrast  cc           Vitals: /75     HR  52     Spo2 99%     Preliminary Report:  Using a 5 Tam[place] short sheath to the right groin under MAC sedation via a selective spinal angiography L1 to was performed and demonstrated                                                official note to follow.   Patient tolerated procedure well, hemodynamically stable, no change in neurological status compared to baseline. Results discussed with neurosurgery and patient.  Right groin sheath was removed, manual compression held to hemostasis for 21 minutes, no active bleeding, no hematoma, quick clot and safeguard balloon dressing applied at  Patient transported to Recovery Room Interventional Neuro- Radiology   Procedure Note PA-C    Procedure: Selective Spinal Angiography   Pre- Procedure Diagnosis: L2 spinal lesion   Post- Procedure Diagnosis:    : Dr Lawson Castillo  Fellow:     Dr Alice Carey   Resident: Dr Patito Aleman    Physician Assistant: Nicole Wallace PA-C  Radiologic Tech:        Gustavo Sylvester LRT  Nurse:                        Bree Henning RN  Anesthesiologist:        Dr Keith Ceballos   Sheath:                      5 B[place]udian short sheath     I/Os: EBL less than 10cc IV fluids:     cc Urine output     cc Contrast  cc           Vitals: /75     HR  52     Spo2 99%     Preliminary Report:  Using a 5 [place]udian short sheath to the right groin under MAC sedation via a selective spinal angiography L1 to was performed and demonstrated                                                official note to follow.   Patient tolerated procedure well, hemodynamically stable, no change in neurological status compared to baseline. Results discussed with neurosurgery and patient.  Right groin sheath was removed, manual compression held to hemostasis for 21 minutes, no active bleeding, no hematoma, quick clot and safeguard balloon dressing applied at  Patient transported to Recovery Room Interventional Neuro- Radiology   Procedure Note PA-C    Procedure: Selective Spinal Angiography and embolization with 7 EV3 coils   Pre- Procedure Diagnosis: L2 spinal lesion   Post- Procedure Diagnosis: L2 lesion s/post 7 coils    : Dr Lawson Castillo  Fellow:     Dr Alice Carey   Resident: Dr Patito Aleman    Physician Assistant: Nicole Wallace PA-C  Radiologic Tech:        Gustavo Sylvester LRT  Nurse:                        Bree Henning RN  Anesthesiologist:        Dr Keith Ceballos   Sheath:                      5 American short sheath     I/Os: EBL less than 10cc IV fluids:800cc Urine output 800cc Contrast 105cc           Vitals: /75     HR  52     Spo2 99%     Preliminary Report:  Using a 5 American short sheath to the right groin under MAC sedation via a selective spinal angiography L1 to L5 and bilateral iliacs was performed and demonstrated vascular supply from Left L2 which contributes to posterior spinal artery and tumor. Today successful embolization of tumor using 7 coils. Official note to follow.   Patient tolerated procedure well, hemodynamically stable, no change in neurological status compared to baseline. Results discussed with neurosurgery and patient. Right groin sheath was removed, manual compression held to hemostasis for 21 minutes, no active bleeding, no hematoma, quick clot and safeguard balloon dressing applied at 1115am. Patient transported to Recovery Room 2nd floor. 96

## 2023-10-25 NOTE — ED ADULT TRIAGE NOTE - SPO2 (%)
100 Post-Care Instructions: I reviewed with the patient in detail post-care instructions. Patient is to keep the biopsy site dry overnight, and then apply bacitracin twice daily until healed. Patient may apply hydrogen peroxide soaks to remove any crusting.

## 2024-01-01 NOTE — H&P ADULT - ASSESSMENT
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
hard copy, drawn during this pregnancy

## 2024-01-12 NOTE — OCCUPATIONAL THERAPY INITIAL EVALUATION ADULT - GENERAL OBSERVATIONS, REHAB EVAL
Addended by: HESHAM AMBROCIO on: 1/12/2024 08:57 AM     Modules accepted: Orders     Pt received semisupine in bed, +hemovac x2, +IVL
